# Patient Record
Sex: FEMALE | Race: BLACK OR AFRICAN AMERICAN
[De-identification: names, ages, dates, MRNs, and addresses within clinical notes are randomized per-mention and may not be internally consistent; named-entity substitution may affect disease eponyms.]

---

## 2017-09-09 ENCOUNTER — HOSPITAL ENCOUNTER (INPATIENT)
Dept: HOSPITAL 74 - JER | Age: 82
LOS: 5 days | Discharge: HOME | DRG: 378 | End: 2017-09-14
Attending: INTERNAL MEDICINE | Admitting: INTERNAL MEDICINE
Payer: COMMERCIAL

## 2017-09-09 VITALS — BODY MASS INDEX: 41 KG/M2

## 2017-09-09 DIAGNOSIS — K57.90: ICD-10-CM

## 2017-09-09 DIAGNOSIS — N18.4: ICD-10-CM

## 2017-09-09 DIAGNOSIS — E11.21: ICD-10-CM

## 2017-09-09 DIAGNOSIS — I87.2: ICD-10-CM

## 2017-09-09 DIAGNOSIS — E78.5: ICD-10-CM

## 2017-09-09 DIAGNOSIS — J45.909: ICD-10-CM

## 2017-09-09 DIAGNOSIS — K59.09: ICD-10-CM

## 2017-09-09 DIAGNOSIS — E11.319: ICD-10-CM

## 2017-09-09 DIAGNOSIS — K57.93: Primary | ICD-10-CM

## 2017-09-09 DIAGNOSIS — K43.9: ICD-10-CM

## 2017-09-09 DIAGNOSIS — E66.8: ICD-10-CM

## 2017-09-09 DIAGNOSIS — E11.22: ICD-10-CM

## 2017-09-09 DIAGNOSIS — D62: ICD-10-CM

## 2017-09-09 DIAGNOSIS — N17.8: ICD-10-CM

## 2017-09-09 DIAGNOSIS — I10: ICD-10-CM

## 2017-09-09 DIAGNOSIS — I13.0: ICD-10-CM

## 2017-09-09 DIAGNOSIS — I50.30: ICD-10-CM

## 2017-09-09 LAB
ALBUMIN SERPL-MCNC: 3.2 G/DL (ref 3.4–5)
ALP SERPL-CCNC: 76 U/L (ref 45–117)
ALT SERPL-CCNC: 19 U/L (ref 12–78)
ANION GAP SERPL CALC-SCNC: 12 MMOL/L (ref 8–16)
APTT BLD: 24 SECONDS (ref 26.9–34.4)
AST SERPL-CCNC: 14 U/L (ref 15–37)
BASOPHILS # BLD: 0.8 % (ref 0–2)
BILIRUB SERPL-MCNC: 0.9 MG/DL (ref 0.2–1)
CALCIUM SERPL-MCNC: 8.9 MG/DL (ref 8.5–10.1)
CO2 SERPL-SCNC: 23 MMOL/L (ref 21–32)
CREAT SERPL-MCNC: 2.4 MG/DL (ref 0.55–1.02)
DEPRECATED RDW RBC AUTO: 18.4 % (ref 11.6–15.6)
EOSINOPHIL # BLD: 1 % (ref 0–4.5)
GLUCOSE SERPL-MCNC: 154 MG/DL (ref 74–106)
INR BLD: 1.15 (ref 0.82–1.09)
MCH RBC QN AUTO: 28 PG (ref 25.7–33.7)
MCHC RBC AUTO-ENTMCNC: 31.4 G/DL (ref 32–36)
MCV RBC: 89 FL (ref 80–96)
NEUTROPHILS # BLD: 73.3 % (ref 42.8–82.8)
PLATELET # BLD AUTO: 173 K/MM3 (ref 134–434)
PMV BLD: 9 FL (ref 7.5–11.1)
PROT SERPL-MCNC: 6.6 G/DL (ref 6.4–8.2)
PT PNL PPP: 12.7 SEC (ref 9.98–11.88)
WBC # BLD AUTO: 6.3 K/MM3 (ref 4–10)

## 2017-09-09 PROCEDURE — 30233N1 TRANSFUSION OF NONAUTOLOGOUS RED BLOOD CELLS INTO PERIPHERAL VEIN, PERCUTANEOUS APPROACH: ICD-10-PCS | Performed by: INTERNAL MEDICINE

## 2017-09-09 PROCEDURE — P9038 RBC IRRADIATED: HCPCS

## 2017-09-09 PROCEDURE — P9058 RBC, L/R, CMV-NEG, IRRAD: HCPCS

## 2017-09-09 RX ADMIN — DEXTROSE AND SODIUM CHLORIDE SCH MLS/HR: 5; 900 INJECTION, SOLUTION INTRAVENOUS at 16:35

## 2017-09-09 RX ADMIN — PANTOPRAZOLE SODIUM SCH MLS/HR: 40 INJECTION, POWDER, FOR SOLUTION INTRAVENOUS at 23:00

## 2017-09-09 NOTE — PDOC
History of Present Illness





- General


Chief Complaint: Rectal Bleed


Stated Complaint: RECTAL BLEED


Time Seen by Provider: 09/09/17 13:41


History Source: Patient


Exam Limitations: No Limitations





- History of Present Illness


Initial Comments: 


86yo F with PMH of GI bleed, DM, presents c/o rectal bleeding.  Pt reports 1 

bloody bowel movement yesterday at 4pm, then nothing until today at 11am.  The 

bloody bowel movement at 11am today consisted of bright red blood and clots, 

prompting pt to come to the ER.  Pt had another bloody bowel movement here in 

the ER at 4pm.  Pt's last adm to the hospital was in 11/2016 for GI bleeding 

found to be 2/2 diverticulitis.  Pt denies abdominal pain.  





PCP: Dr. Mckee (used to be Dr. Denise)


GI: Dr. Ramires





09/09/17 16:30








Timing/Duration: 24 hours


Associated Symptoms: denies: chest pain, cough, diaphoresis, fever/chills, 

headaches, nausea/vomiting, rash, shortness of breath, syncope, weakness





Past History





- Past Medical History


Allergies/Adverse Reactions: 


 Allergies











Allergy/AdvReac Type Severity Reaction Status Date / Time


 


diphenhydramine HCl Allergy Severe anaphyl Verified 09/09/17 13:29





[From Benadryl]     


 


aspirin Allergy Unknown  Verified 09/09/17 13:29


 


natty flavor Allergy   Verified 09/09/17 13:29











Home Medications: 


Ambulatory Orders





Allopurinol [Zyloprim -] 100 mg PO DAILY 07/19/14 


Ascorbic Acid [Vitamin C] 0 mg PO DAILY 07/19/14 


Budesonide/Formeterol Fumarate [SYMBICORT 160/4.5mcg -] 2 inh PO BID 07/19/14 


Gabapentin 200 mg PO TID 07/19/14 


Simvastatin [Zocor -] 40 mg PO HS 07/19/14 


Valsartan [Diovan] 320 mg PO DAILY 07/19/14 


Acetaminophen [Tylenol .Extra-Strength -] 1,000 mg PO Q8H PRN #100 tablet 07/21/ 14 


Amlodipine Besylate [Norvasc -] 2.5 mg PO DAILY 11/14/15 


Furosemide [Lasix -] 80 mg PO BID 11/04/16 


Glipizide 5 mg PO BID 09/09/17 








Anemia: No


Asthma: Yes


Cancer: No


Cardiac Disorders: No


CVA: No


COPD: No


CHF: No


Dementia: No


Diabetes: Yes


GI Disorders: Yes (RECTAL BLEED)


 Disorders: No


HTN: Yes


Hypercholesterolemia: Yes


Liver Disease: No


Suicide Attempt (Hx): No


Seizures: No


Thyroid Disease: No





- Surgical History


Abdominal Surgery: Yes (hernia repairs, small bowel resection,debride abd wall 

abscess,)


Appendectomy: Yes


Cardiac Surgery: No


Cholecystectomy: No


Lung Surgery: No


Neurologic Surgery: No


Orthopedic Surgery: No





- Immunization History


Immunization Up to Date: Yes





- Psycho/Social/Smoking Cessation Hx


Anxiety: No


Suicidal Ideation: No


Smoking Status: No


Smoking History: Never smoked


Have you smoked in the past 12 months: No


Number of Cigarettes Smoked Daily: 0


Hx Alcohol Use: No


Drug/Substance Use Hx: No


Substance Use Type: None


Hx Substance Use Treatment: No





**Review of Systems





- Review of Systems


Able to Perform ROS?: Yes


Is the patient limited English proficient: No


Constitutional: No: Chills, Diaphoresis, Fever


HEENTM: No: Recent change in vision, Ear Pain, Nose Pain, Nose Congestion, 

Throat Pain


Respiratory: No: Cough, Orthopnea, Shortness of Breath, Stridor, Wheezing, 

Hemoptysis


Cardiac (ROS): Yes: Lightheadedness (at 11am when she had bloody bowel movement

, not now).  No: Chest Pain, Edema, Irregular Heart Rate, Palpitations, Syncope

, Chest Tightness


ABD/GI: Yes: Rectal Bleeding.  No: Abdominal Distended, Nausea, Vomiting, 

Abdominal cramping


: No: Burning, Dysuria, Hematuria


Musculoskeletal: No: Joint Pain, Muscle Pain


Integumentary: No: Bruising, Lesions, Rash


Neurological: No: Headache, Paresthesia, Weakness





*Physical Exam





- Vital Signs


 Last Vital Signs











Temp Pulse Resp BP Pulse Ox


 


 98.8 F   80   20   130/57   100 


 


 09/09/17 13:26  09/09/17 13:26  09/09/17 13:26 09/09/17 13:26 09/09/17 13:26














- Physical Exam


General Appearance: Yes: Appropriately Dressed, Obese.  No: Apparent Distress


HEENT: positive: EOMI, Normal Voice.  negative: Pale Conjunctivae, Scleral 

Icterus (R), Scleral Icterus (L), Nasal Congestion, Rhinorrhea


Neck: positive: Trachea midline, Supple


Respiratory/Chest: positive: Lungs Clear, Normal Breath Sounds.  negative: 

Respiratory Distress, Accessory Muscle Use


Cardiovascular: positive: Regular Rhythm, Regular Rate, S1, S2.  negative: 

Murmur


Gastrointestinal/Abdominal: positive: Soft.  negative: Distended, Guarding, 

Rebound, Tenderness


Rectal Exam: positive: other (small amount of blood seen externally on the 

rectum)


Extremity: positive: Pedal Edema, Swelling (2+ pitting edema to britt LE, which 

pt reports is normal for her).  negative: Calf Tenderness, Erythema


Integumentary: positive: Dry, Warm.  negative: Rash, Bruising


Neurologic: positive: Fully Oriented, Alert, Normal Mood/Affect, Normal Response





ED Treatment Course





- LABORATORY


CBC & Chemistry Diagram: 


 09/09/17 14:16





 09/09/17 14:16





- ADDITIONAL ORDERS


Additional order review: 


 Laboratory  Results











  09/09/17





  14:16


 


Blood Type  Cancelled


 


Antibody Screen  Cancelled


 


Spec Expiration Date  Cancelled








 











  09/09/17





  14:16


 


RBC  2.66 L D


 


MCV  89.0


 


MCHC  31.4 L


 


RDW  18.4 H D


 


MPV  9.0


 


Neutrophils %  73.3


 


Lymphocytes %  17.8  D


 


Monocytes %  7.1


 


Eosinophils %  1.0


 


Basophils %  0.8














Medical Decision Making





- Medical Decision Making


86yo F with PMH of GI bleed, diverticulitis, DM, presents c/o rectal bleeding.  

Small amount of blood seen on the external rectum on exam.  Last bloody bowel 

movement occurred at 4pm in ER.  Pt last admitted to hospital in 11/2016 for GI 

bleeding found to be 2/2 diverticulitis.





EKG 


CBC with diff, CMP, type and screen, stool occult blood, INR





09/09/17 16:47








09/09/17 17:11


CBC with diff - H/H 7.4/23.7 -> PRBCs given, Protonix 80mg IVPB given 


Pt to be admitted.  Dr. Danielle admitting for Dr. Mckee.  Dr. Danielle 

contacted and accepted adm.  ICU contacted and NP accepted adm.  





09/09/17 17:12











*DC/Admit/Observation/Transfer


Diagnosis at time of Disposition: 


 Rectal bleeding





- Discharge Dispostion


Condition at time of disposition: Guarded


Admit: Yes





- Referrals


Referrals: 


Jace Mckee MD [Primary Care Provider] -

## 2017-09-09 NOTE — PDOC
Attending Attestation





- HPI


HPI: 


09/09/17 14:39


The patient is a 87 year old female, with a significant past medical history of 

Asthma, DM, Rectal bleeding, HTN, HLD who presents to the emergency department 

with rectal bleeding since yesterday. Patient reports 2 episodes of bright red 

blood, with clots per rectum. Patient endorses associated dizziness and 

weakness after passing bowels. 





She denies chest pain, headache. She denies fever, chills, abdominal pain, 

nausea, vomit, diarrhea or constipation. She denies dysuria, frequency, urgency 

or hematuria. 





Allergies: diphenhydramine HCl, aspirin, natty flavor


Past surgical history: hernia repairs, small bowel resection,debride abdomen 

wall abscess, Appendectomy 


Social history: None


PCP: Dr. Mckee








- Physicial Exam


PE: 


09/09/17 14:39


GENERAL: Awake, alert, and fully oriented, in no acute distress. +Morbidly 

obese. 


HEAD: No signs of trauma


EYES: PERRLA, EOMI, sclera anicteric, conjunctiva clear


ENT: Auricles normal inspection, hearing grossly normal, nares patent, 

oropharynx clear without exudates. Moist mucosa


NECK: Normal ROM, supple, no lymphadenopathy, JVD, or masses


LUNGS: Breath sounds equal, clear to auscultation bilaterally.  No wheezes, and 

no crackles


HEART: Regular rate and rhythm, normal S1 and S2, no murmurs, rubs or gallops


ABDOMEN: Soft, nontender, normoactive bowel sounds.  No guarding, no rebound.  

No masses


EXTREMITIES: Normal range of motion. +2+ pitting edema to the knees 

bilaterally. No clubbing or cyanosis. No cords, erythema, or tenderness


NEUROLOGICAL: Cranial nerves II through XII grossly intact. Normal speech, 

normal gait


SKIN: Warm, Dry, normal turgor, no rashes or lesions noted.








- Medical Decision Making


09/09/17 16:43


Paged Dr. Debbie Danielle


Awaiting call back. 





09/09/17 16:50


Dr. Danielle returned the page and the patient's case was discussed. 





Documentation prepared by Sophia Almaraz, acting as medical scribe for Anne Goode MD


























<Sophia Almaraz - Last Filed: 09/09/17 16:50>





- Resident


Resident Name: Estela Moreno





- ED Attending Attestation


I have performed the following: I have examined & evaluated the patient, The 

case was reviewed & discussed with the resident, I agree w/resident's findings 

& plan, Exceptions are as noted





- Medical Decision Making


Pt with prior history of multiple GI bleeds. Most recent endo/colonoscopy shows 

significant blood, possibly diverticular bleed. Also with gastric erosions. 

Patient took protonix PO this morning, therefore I will just give the drip at 

present. Will admit to Dr. Danielle. 








<Anne Goode - Last Filed: 09/10/17 07:24>

## 2017-09-09 NOTE — CONSULT
Consult


Consult Specialty:: Pulm/CCM


Reason for Consultation:: GIB





- History of Present Illness


Chief Complaint: BRBPR


History of Present Illness: 


This is a 88 yo woman PNH: Asthma, DM, GIB: diverticular bleed, rectal, HTN who 

presented with BRBPR w/ clots x2 c/b dizziness (No LOC) starting the day prior 

to admission. She is followed closely by GI as an outpatient and was noted to 

be anemic ~2weeks prior to admission associated with tarry stools. In ED Hgb 

7.4 (baseline 10) with SCr 2.4. She was started on PPI drip. She was transfused 

PRBC x2. 


 


She denies CP/HA/F/chills/abd pain/nausea





- History Source


History Provided By: Patient, Medical Record





- Past Medical History


CNS: Yes: Other


Cardio/Vascular: Yes: CHF, HTN, Hyperlipdemia


Pulmonary: Yes: COPD


Gastrointestinal: Yes: Constipation, Diverticulosis, Gastritis, Other (colon 

polyp 2010, ventral incisional hernias)


Renal/: Yes: Renal Inusuff


Endocrine: Yes: Diabetes Mellitus, Other (diabetic retinopathy and nephropathy)





- Past Surgical History


Past Surgical History: Yes: Appendectomy, Breast Biopsy, Cataract Removal, 

Colonoscopy, Hernia Repair, Hysterectomy, Oopherectomy, Tonsillectomy, Upper 

Endoscopy





- Alcohol/Substance Use


Hx Alcohol Use: No





- Smoking History


Smoking history: Never smoked


Have you smoked in the past 12 months: No


Aproximately how many cigarettes per day: 0





- Social History


Usual Living Arrangement: Alone


ADL: Independent


Occupation: retired 


History of Recent Travel: No





Home Medications





- Allergies


Allergies/Adverse Reactions: 


 Allergies











Allergy/AdvReac Type Severity Reaction Status Date / Time


 


diphenhydramine HCl Allergy Severe anaphyl Verified 17 13:29





[From Benadryl]     


 


aspirin Allergy Unknown  Verified 17 13:29


 


natty flavor Allergy   Verified 17 13:29














- Home Medications


Home Medications: 


Ambulatory Orders





Allopurinol [Zyloprim -] 100 mg PO DAILY 14 


Ascorbic Acid [Vitamin C] 0 mg PO DAILY 14 


Budesonide/Formeterol Fumarate [SYMBICORT 160/4.5mcg -] 2 inh PO BID 14 


Gabapentin 200 mg PO TID 07/19/14 


Simvastatin [Zocor -] 40 mg PO HS 14 


Valsartan [Diovan] 320 mg PO DAILY 14 


Acetaminophen [Tylenol .Extra-Strength -] 1,000 mg PO Q8H PRN #100 tablet  


Amlodipine Besylate [Norvasc -] 2.5 mg PO DAILY 11/14/15 


Furosemide [Lasix -] 80 mg PO BID 16 


Glipizide 5 mg PO BID 17 











Family Disease History





- Family Disease History


Family Disease History: Heart Disease: Father ( MI in his 50's), CA: Mother 

(rectal cancer)





Review of Systems





- Review of Systems


Cardiovascular: reports: No Symptoms


Respiratory: reports: No Symptoms


Gastrointestinal: reports: Melena, Rectal Bleeding





Physical Exam


Vital Signs: 


 Vital Signs











Temperature  98.4 F   17 19:45


 


Pulse Rate  64   17 19:45


 


Respiratory Rate  18   17 19:45


 


Blood Pressure  126/59   17 19:45


 


O2 Sat by Pulse Oximetry (%)  100   17 19:45








 Current Medications





Dextrose/Sodium Chloride (D5-Ns -)  1,000 mls @ 83 mls/hr IV ASDIR ELLA


   Last Admin: 17 16:35 Dose:  83 mls/hr


Pantoprazole Sodium 40 mg/ (Sodium Chloride)  100 mls @ 200 mls/hr IVPB BID ELLA


Insulin Aspart (Novolog Vial Sliding Scale -)  0 vial SQ BIDAC ELLA


   PRN Reason: Protocol


   Last Admin: 17 17:49 Dose:  Not Given








Constitutional: Yes: No Distress, Calm


Eyes: Yes: EOM Intact, PERRL


Cardiovascular: Yes: Regular Rate and Rhythm, S1, S2


Respiratory: Yes: CTA Bilaterally


Gastrointestinal: Yes: Normal Bowel Sounds, Soft, Abdomen, Obese


Musculoskeletal: Yes: WNL


Edema: No


Neurological: Yes: Oriented


Labs: 


 CBCD











WBC  6.3 K/mm3 (4.0-10.0)   17  14:16    


 


RBC  2.66 M/mm3 (3.60-5.2)  L D 17  14:16    


 


Hgb  7.4 GM/dL (10.7-15.3)  L D 17  14:16    


 


Hct  23.7 % (32.4-45.2)  L D 17  14:16    


 


MCV  89.0 fl (80-96)   17  14:16    


 


MCHC  31.4 g/dl (32.0-36.0)  L  17  14:16    


 


RDW  18.4 % (11.6-15.6)  H D 17  14:16    


 


Plt Count  173 K/MM3 (134-434)   17  14:16    


 


MPV  9.0 fl (7.5-11.1)   17  14:16    








 CMP











Sodium  142 mmol/L (136-145)   17  14:16    


 


Potassium  4.2 mmol/L (3.5-5.1)   17  14:16    


 


Chloride  107 mmol/L ()   17  14:16    


 


Carbon Dioxide  23 mmol/L (21-32)   17  14:16    


 


Anion Gap  12  (8-16)   17  14:16    


 


BUN  52 mg/dL (7-18)  H D 17  14:16    


 


Creatinine  2.4 mg/dL (0.55-1.02)  H D 17  14:16    


 


Creat Clearance w eGFR  19.10  (>60)   17  14:16    


 


Random Glucose  154 mg/dL ()  H  17  14:16    


 


Calcium  8.9 mg/dL (8.5-10.1)   17  14:16    


 


Total Bilirubin  0.9 mg/dL (0.2-1.0)   17  14:16    


 


AST  14 U/L (15-37)  L  17  14:16    


 


ALT  19 U/L (12-78)   17  14:16    


 


Alkaline Phosphatase  76 U/L ()  D 17  14:16    


 


Total Protein  6.6 g/dl (6.4-8.2)   17  14:16    


 


Albumin  3.2 g/dl (3.4-5.0)  L  17  14:16    














Imaging





- Results


Chest X-ray: Report Reviewed, Image Reviewed





Assessment/Plan


All Active Problems





(HFpEF) heart failure with preserved ejection fraction (Acute) 


Acute on chronic renal failure (Acute) 


Anemia (Acute) 


COPD (chronic obstructive pulmonary disease) (Acute) 


Diabetes mellitus (Acute) 


Diverticulosis (Acute) 


Gastrointestinal hemorrhage (Acute) 


Rectal bleeding (Acute) 


Renal insufficiency, mild (Acute) 


Venous insufficiency (chronic) (peripheral) (Acute) 





88 yo w/ LGIB likely recurrent diverticular bleed with Acute on chronic kidney 

injury likely prerenal 





Notify GI


Obtain adequate IV access.  2 large (18 gauge or larger) bore IVs.  


Type and crossmatch blood.


Fluid resuscitate to goal of stabilization of  HR and BP with Normal Saline


Transfuse PRBCS to hemoglobin > 7g/dl, and platelets  >50K.


Consider FFP, vitamin K if INR >1.5 


Consider DDAVP (0.3 mcg/kg IV q12h x 2) if renal failure/uremia.


Cont PPI drip 


NPO until after EGD/c-scope


Check CBC every 4-6 hours, coags every 8-12 hours if abnormal


Renal dose all medications


Send urine lytes


Renal u/s 


SCD for DVT prophylaxis





Boerem ACNP


Pulm/CCM





CCT: 35m

## 2017-09-10 LAB
ANION GAP SERPL CALC-SCNC: 9 MMOL/L (ref 8–16)
BASOPHILS # BLD: 0.7 % (ref 0–2)
BASOPHILS # BLD: 0.7 % (ref 0–2)
BASOPHILS # BLD: 0.9 % (ref 0–2)
CALCIUM SERPL-MCNC: 8.4 MG/DL (ref 8.5–10.1)
CO2 SERPL-SCNC: 23 MMOL/L (ref 21–32)
CREAT SERPL-MCNC: 1.8 MG/DL (ref 0.55–1.02)
DEPRECATED RDW RBC AUTO: 16.2 % (ref 11.6–15.6)
DEPRECATED RDW RBC AUTO: 16.6 % (ref 11.6–15.6)
DEPRECATED RDW RBC AUTO: 17 % (ref 11.6–15.6)
EOSINOPHIL # BLD: 1 % (ref 0–4.5)
EOSINOPHIL # BLD: 1.1 % (ref 0–4.5)
EOSINOPHIL # BLD: 1.2 % (ref 0–4.5)
GLUCOSE SERPL-MCNC: 97 MG/DL (ref 74–106)
MCH RBC QN AUTO: 28.3 PG (ref 25.7–33.7)
MCH RBC QN AUTO: 28.8 PG (ref 25.7–33.7)
MCH RBC QN AUTO: 29 PG (ref 25.7–33.7)
MCHC RBC AUTO-ENTMCNC: 32.3 G/DL (ref 32–36)
MCHC RBC AUTO-ENTMCNC: 33.2 G/DL (ref 32–36)
MCHC RBC AUTO-ENTMCNC: 33.6 G/DL (ref 32–36)
MCV RBC: 86.2 FL (ref 80–96)
MCV RBC: 86.7 FL (ref 80–96)
MCV RBC: 87.6 FL (ref 80–96)
NEUTROPHILS # BLD: 72.6 % (ref 42.8–82.8)
NEUTROPHILS # BLD: 73.4 % (ref 42.8–82.8)
NEUTROPHILS # BLD: 76.1 % (ref 42.8–82.8)
PLATELET # BLD AUTO: 122 K/MM3 (ref 134–434)
PLATELET # BLD AUTO: 125 K/MM3 (ref 134–434)
PLATELET # BLD AUTO: 126 K/MM3 (ref 134–434)
PMV BLD: 9.6 FL (ref 7.5–11.1)
PMV BLD: 9.6 FL (ref 7.5–11.1)
PMV BLD: 9.8 FL (ref 7.5–11.1)
WBC # BLD AUTO: 6.6 K/MM3 (ref 4–10)
WBC # BLD AUTO: 6.9 K/MM3 (ref 4–10)
WBC # BLD AUTO: 7.4 K/MM3 (ref 4–10)

## 2017-09-10 RX ADMIN — INSULIN ASPART SCH: 100 INJECTION, SOLUTION INTRAVENOUS; SUBCUTANEOUS at 15:30

## 2017-09-10 RX ADMIN — PANTOPRAZOLE SODIUM SCH MG: 40 INJECTION, POWDER, FOR SOLUTION INTRAVENOUS at 22:00

## 2017-09-10 RX ADMIN — DEXTROSE AND SODIUM CHLORIDE SCH MLS/HR: 5; 900 INJECTION, SOLUTION INTRAVENOUS at 15:30

## 2017-09-10 RX ADMIN — PANTOPRAZOLE SODIUM SCH MLS/HR: 40 INJECTION, POWDER, FOR SOLUTION INTRAVENOUS at 12:21

## 2017-09-10 RX ADMIN — PANTOPRAZOLE SODIUM SCH MLS/HR: 40 INJECTION, POWDER, FOR SOLUTION INTRAVENOUS at 21:37

## 2017-09-10 RX ADMIN — INSULIN ASPART SCH: 100 INJECTION, SOLUTION INTRAVENOUS; SUBCUTANEOUS at 06:14

## 2017-09-10 NOTE — CON.GI
Consult


Consult Specialty:: GASTROENTEROLOGY





- History of Present Illness


Chief Complaint: RECTAL BLEEDING


History of Present Illness: 


THIS IS A 87 NYEAR OLD FEMALE WITH HX OF DIVERTICULOSIS AND PRIOR GI BLEEDING 

ADMITTED FOR THE SAME AFTER A WEEK OF SEVERE CONSTIPATION.  SHE STARTED TO HAVE 

BRBPR ON THURSDAY AND WAS ADMITTED YESTERDAY. ADMISSION HGB WAS 7.4.  SHE WAS 

TRANSFUSED 2 UNITS PRBC'S WITH MINIMAL IMPROVEMENT. SHE IS NOW BEING TRANSFUSED 

HER THIRD UNTI.  THE STAFF AND PATIENT STATE THAT HER BLEEDING HAS SLOWED 

SOMEWHAT.  HER VITAL SIGNS WERE ALWAYS STABLE.





HER LAST ADMISSION FOR GI BLEEDING INCLUDED AN EGD AND COLONOSCOPY. THE UPPER 

ENDOSCOPY DID NOT REVEAL AN ETIOLOGY FOR THE BLOOD LOSSS.  THE COLONOSCOPY 

SHOWED CLOTTED BLOOD AND SEVERE DIVERTICULOSIS WITH BILE FLOW FROM THE ICV. 





- History Source


History Provided By: Patient, Medical Record


Limitations to Obtaining History: No Limitations





- Past Medical History


CNS: Yes: Other


Cardio/Vascular: Yes: CHF, HTN, Hyperlipdemia


Pulmonary: Yes: COPD


Gastrointestinal: Yes: Constipation, Diverticulosis, Gastritis, Other (colon 

polyp 2010, ventral incisional hernias, DIVERTICULOSIS WITH CLOTTED BLOOD)


Renal/: Yes: Renal Inusuff


...Pregnant: No


Endocrine: Yes: Diabetes Mellitus, Other (diabetic retinopathy and nephropathy)





- Past Surgical History


Past Surgical History: Yes: Appendectomy, Breast Biopsy, Cataract Removal, 

Colonoscopy, Hernia Repair, Hysterectomy, Oopherectomy, Tonsillectomy, Upper 

Endoscopy





- Alcohol/Substance Use


Hx Alcohol Use: No





- Smoking History


Smoking history: Never smoked


Have you smoked in the past 12 months: No


Aproximately how many cigarettes per day: 0





- Social History


Usual Living Arrangement: Alone


ADL: Independent


Occupation: retired 


History of Recent Travel: No





Home Medications





- Allergies


Allergies/Adverse Reactions: 


 Allergies











Allergy/AdvReac Type Severity Reaction Status Date / Time


 


diphenhydramine HCl Allergy Severe anaphyl Verified 17 13:29





[From Benadryl]     


 


aspirin Allergy Unknown  Verified 17 13:29


 


natty flavor Allergy   Verified 17 13:29














- Home Medications


Home Medications: 


Ambulatory Orders





Allopurinol [Zyloprim -] 100 mg PO DAILY 14 


Ascorbic Acid [Vitamin C] 0 mg PO DAILY 14 


Budesonide/Formeterol Fumarate [SYMBICORT 160/4.5mcg -] 2 inh PO BID 14 


Gabapentin 200 mg PO TID 14 


Simvastatin [Zocor -] 40 mg PO HS 14 


Valsartan [Diovan] 320 mg PO DAILY 14 


Acetaminophen [Tylenol .Extra-Strength -] 1,000 mg PO Q8H PRN #100 tablet  


Amlodipine Besylate [Norvasc -] 2.5 mg PO DAILY 11/14/15 


Furosemide [Lasix -] 80 mg PO BID 16 


Glipizide 5 mg PO BID 17 











Family Disease History





- Family Disease History


Family Disease History: Heart Disease: Father ( MI in his 50's), CA: Mother 

(rectal cancer)





Review of Systems





- Review of Systems


Constitutional: reports: Loss of Appetite


Eyes: reports: No Symptoms


HENT: reports: No Symptoms


Neck: reports: No Symptoms


Cardiovascular: reports: No Symptoms


Respiratory: reports: No Symptoms


Gastrointestinal: reports: Constipation, Rectal Bleeding


Genitourinary: reports: No Symptoms


Breasts: reports: No Symptoms Reported


Musculoskeletal: reports: No Symptoms


Integumentary: reports: No Symptoms


Neurological: reports: No Symptoms


Endocrine: reports: No Symptoms





Physical Exam-GI


Vital Signs: 


 Vital Signs











Temperature  97.7 F   09/10/17 11:00


 


Pulse Rate  54 L  09/10/17 12:23


 


Respiratory Rate  20   09/10/17 12:23


 


Blood Pressure  144/50   09/10/17 12:23


 


O2 Sat by Pulse Oximetry (%)  100   09/10/17 08:00











Constitutional: Yes: Obese


Eyes: Yes: Conjunctiva Clear


HENT: Yes: Normocephalic


Cardiovascular: Yes: Regular Rate and Rhythm


Respiratory: Yes: Regular


Gastrointestinal Inspection: Yes: WNL


...Auscultate: Yes: Normoactive Bowel Sounds


...Palpate: Yes: Soft


...Rectal Exam: Yes: Guaiac Positive


Extremities: Yes: WNL


Labs: 


 CBC, BMP





 09/10/17 09:24 





 INR, PTT











INR  1.15  (0.82-1.09)  H  17  14:16    








 Laboratory Tests











  17





  14:16 14:16 14:16


 


WBC  6.3  


 


Hgb  7.4 L D  


 


Hct  23.7 L D  


 


Plt Count  173  


 


INR   1.15 H 


 


Sodium    142


 


Potassium    4.2


 


Chloride    107


 


Carbon Dioxide    23


 


Anion Gap    12


 


BUN    52 H D


 


Creatinine    2.4 H D


 


Creat Clearance w eGFR    19.10


 


Random Glucose    154 H


 


Calcium    8.9


 


Total Bilirubin    0.9


 


AST    14 L


 


ALT    19


 


Alkaline Phosphatase    76  D


 


Total Protein    6.6


 


Albumin    3.2 L


 


Stool Occult Blood   














  09/09/17 09/10/17





  14:26 09:24


 


WBC   6.9


 


Hgb   7.6 L


 


Hct   23.4 L


 


Plt Count   125 L D


 


INR  


 


Sodium  


 


Potassium  


 


Chloride  


 


Carbon Dioxide  


 


Anion Gap  


 


BUN  


 


Creatinine  


 


Creat Clearance w eGFR  


 


Random Glucose  


 


Calcium  


 


Total Bilirubin  


 


AST  


 


ALT  


 


Alkaline Phosphatase  


 


Total Protein  


 


Albumin  


 


Stool Occult Blood  Negative 














Assessment/Plan


GI BLEEDING


BLOOD LOSS ANEMIA


DIVERTICULAR BLEEDING








AGREE WITH CURRENT PLAN


TRANSFUSE TO HGB 8.5 OR HIGHER


MOST DIVERTICULAR BLEEDING WILL STOP ON ITS OWN


IF BLEEDING DOES NOT STOP OR THE BLEEDING INTENSIFIES WOULD SUGGEST CTA OR 

REPEAT COLONOSCOPY 


TREND CBC,BMP








DR BIRM[place]AM WILL RETURN IN THE AM 





ICU TIME: 45 MINUTES














NINFA BRADY MD

## 2017-09-10 NOTE — PN
Progress Note (short form)





- Note


Progress Note: 


PULM/CCM





Pt seen and examined in ICU





24HR:


-hemodynamically stable


-1 further dark maroon stool


-repeat CBC pending





 Vital Signs











Temp  98.6 F   09/10/17 00:00


 


Pulse  56 L  09/10/17 07:59


 


Resp  22   09/10/17 07:59


 


BP  132/78   09/10/17 07:59


 


Pulse Ox  100   09/10/17 08:00








 Intake & Output











 09/09/17 09/09/17 09/10/17





 11:59 23:59 11:59


 


Intake Total  350 724


 


Balance  350 724


 


Weight  98.4 kg 


 


Intake:   


 


  IV   664


 


    D5-Ns - 1,000 ml @ 83 mls   664





    /hr IV ASDIR ELLA Rx#:   





    [id number]   


 


  IVPB   60


 


  Packed Cells  350 


 


Other:   


 


  Voiding Method  Bedpan Bedpan


 


  # Unmeasured Voids   


 


    Void  2 


 


  Bowel Movement  Yes No


 


  # Bowel Movements  2 


 


  Height  5 ft 1 in 


 


  Body Mass Index (BMI)  41.0 








Active Medications





Dextrose/Sodium Chloride (D5-Ns -)  1,000 mls @ 83 mls/hr IV ASDIR ELLA


   Last Admin: 09/09/17 16:35 Dose:  83 mls/hr


Pantoprazole Sodium 40 mg/ (Sodium Chloride)  100 mls @ 200 mls/hr IVPB BID ELLA


   Last Admin: 09/09/17 23:00 Dose:  200 mls/hr


Insulin Aspart (Novolog Vial Sliding Scale -)  1 vial SQ BIDAC ELLA


   PRN Reason: Protocol


   Last Admin: 09/10/17 06:14 Dose:  Not Given





 CBC, BMP





 09/09/17 14:16 





 09/09/17 14:16 


 INR, PTT











INR  1.15  (0.82-1.09)  H  09/09/17  14:16    











Assessment/Plan


All Active Problems





(HFpEF) heart failure with preserved ejection fraction (Acute) 


Acute on chronic renal failure (Acute) 


Anemia (Acute) 


COPD (chronic obstructive pulmonary disease) (Acute) 


Diabetes mellitus (Acute) 


Diverticulosis (Acute) 


Gastrointestinal hemorrhage (Acute) 


Rectal bleeding (Acute) 


Renal insufficiency, mild (Acute) 


Venous insufficiency (chronic) (peripheral) (Acute) 





88 yo w/ LGIB likely recurrent diverticular bleed with Acute on chronic kidney 

injury likely prerenal 





GI consult sent in ED


Maintain 2 large (18 gauge or larger) bore IVs.  


Type and crossmatch blood.


Fluid resuscitate to goal of stabilization of  HR and BP with Normal Saline


Transfuse PRBCS to hemoglobin > 7g/dl, and platelets  >50K.


Consider FFP, vitamin K if INR >1.5 


can consider IR if brisk lower and able to localize


Cont PPI drip 


NPO until after EGD/c-scope


Check CBC every 4-6 hours, coags every 8-12 hours if abnormal


Renal dose all medications


Send urine lytes


Renal u/s 


SCD for DVT prophylaxis








Gerard Martinez ACNP


[id number]





35CCT

## 2017-09-10 NOTE — HP
Admitting History and Physical





- Primary Care Physician


PCP: Jace Mckee





- Admission


Chief Complaint: gi bleeding


History of Present Illness: 


-ER HISTORY---


 HPI


HPI: 


17 14:39


The patient is a 87 year old female, with a significant past medical history of 

Asthma, DM, Rectal bleeding, HTN, HLD who presents to the emergency department 

with rectal bleeding since yesterday. Patient reports 2 episodes of bright red 

blood, with clots per rectum. Patient endorses associated dizziness and 

weakness after passing bowels. 





She denies chest pain, headache. She denies fever, chills, abdominal pain, 

nausea, vomit, diarrhea or constipation. She denies dysuria, frequency, urgency 

or hematuria. 





Allergies: diphenhydramine HCl, aspirin, natty flavor


Past surgical history: hernia repairs, small bowel resection,debride abdomen 

wall abscess, Appendectomy 


Social history: None


PCP: Dr. Mckee





PT EXAMINED BY ME IN THE ICU





Apparently fine till yesterday when she had bloody stool x 2 at home. In the ER

, had bloody bm and here in ICU this AM as well.


No abdominal pain , but feels dizzy. Received 2 units PRBC so far. Has h/o 

diverticulosis, multiple abdominal surgeries, SBO, s/p small bowel resection, 

colostomy and reversal of colostomy. 





History Source: Patient


Limitations to Obtaining History: No Limitations





- Past Medical History


CNS: Yes: Other


Cardiovascular: Yes: CHF, HTN, Hyperlipdemia


Pulmonary: Yes: COPD


Gastrointestinal: Yes: Constipation, Diverticulosis, Gastritis, Other (colon 

polyp 2010, ventral incisional hernias)


Renal/: Yes: Renal Inusuff


...Pregnant: No


Endocrine: Yes: Diabetes Mellitus, Other (diabetic retinopathy and nephropathy)





- Past Surgical History


Past Surgical History: Yes: Appendectomy, Breast Biopsy, Cataract Removal, 

Colonoscopy, Hernia Repair, Hysterectomy, Oopherectomy, Tonsillectomy, Upper 

Endoscopy





- Smoking History


Smoking history: Never smoked


Have you smoked in the past 12 months: No


Aproximately how many cigarettes per day: 0





- Alcohol/Substance Use


Hx Alcohol Use: No





- Social History


ADL: Independent


Occupation: retired 


History of Recent Travel: No





Home Medications





- Allergies


Allergies/Adverse Reactions: 


 Allergies











Allergy/AdvReac Type Severity Reaction Status Date / Time


 


diphenhydramine HCl Allergy Severe anaphyl Verified 17 13:29





[From Benadryl]     


 


aspirin Allergy Unknown  Verified 17 13:29


 


natty flavor Allergy   Verified 17 13:29














- Home Medications


Home Medications: 


Ambulatory Orders





Allopurinol [Zyloprim -] 100 mg PO DAILY 14 


Ascorbic Acid [Vitamin C] 0 mg PO DAILY 14 


Budesonide/Formeterol Fumarate [SYMBICORT 160/4.5mcg -] 2 inh PO BID 14 


Gabapentin 200 mg PO TID 14 


Simvastatin [Zocor -] 40 mg PO HS 14 


Valsartan [Diovan] 320 mg PO DAILY 14 


Acetaminophen [Tylenol .Extra-Strength -] 1,000 mg PO Q8H PRN #100 tablet  


Amlodipine Besylate [Norvasc -] 2.5 mg PO DAILY 11/14/15 


Furosemide [Lasix -] 80 mg PO BID 16 


Glipizide 5 mg PO BID 17 











Family Disease History





- Family Disease History


Family Disease History: Heart Disease: Father ( MI in his 50's), CA: Mother 

(rectal cancer)





Review of Systems





- Review of Systems


Constitutional: denies: Chills, Fever, Loss of Appetite


Gastrointestinal: reports: Melena, Rectal Bleeding.  denies: Abdominal Pain, 

Dysphagia, Vomiting Blood





Physical Examination


Vital Signs: 


 Vital Signs











Temperature  98.6 F   09/10/17 00:00


 


Pulse Rate  56 L  09/10/17 07:59


 


Respiratory Rate  22   09/10/17 07:59


 


Blood Pressure  132/78   09/10/17 07:59


 


O2 Sat by Pulse Oximetry (%)  100   09/10/17 08:00











Constitutional: Yes: No Distress, Pallor


Cardiovascular: Yes: Regular Rate and Rhythm


Respiratory: Yes: CTA Bilaterally


Gastrointestinal: Yes: Normal Bowel Sounds, Soft, Abdomen, Obese, Other (

multiple surgical scars).  No: Distention, Tenderness


Edema: Yes


Edema: LLE: 2+, RLE: 2+


Psychiatric: Yes: Alert, Oriented


Labs: 


 Laboratory Results - last 24 hr











  09/09/17 09/09/17 09/10/17





  14:40 17:47 06:12


 


WBC   


 


Corrected WBC (auto)   


 


RBC   


 


Hgb   


 


Hct   


 


MCV   


 


MCH   


 


MCHC   


 


RDW   


 


Plt Count   


 


MPV   


 


Neutrophils %   


 


Lymphocytes %   


 


Monocytes %   


 


Eosinophils %   


 


Basophils %   


 


Platelet Estimate   


 


Platelet Comment   


 


RBC Morphology   


 


Sodium   


 


Potassium   


 


Chloride   


 


Carbon Dioxide   


 


Anion Gap   


 


BUN   


 


Creatinine   


 


POC Glucometer   97.58320  134.92058


 


Random Glucose   


 


Calcium   


 


Blood Type  O POSITIVE  


 


Antibody Screen  Negative  


 


Crossmatch  See Detail  


 


Spec Expiration Date    














  09/10/17 09/10/17 09/10/17





  08:20 09:24 14:42


 


WBC  Cancelled  6.9  6.6


 


Corrected WBC (auto)  Cancelled  


 


RBC  Cancelled  2.67 L  3.24 L D


 


Hgb  Cancelled  7.6 L  9.3 L D


 


Hct  Cancelled  23.4 L  28.1 L D


 


MCV  Cancelled  87.6  86.7


 


MCH  Cancelled  28.3  28.8


 


MCHC  Cancelled  32.3  33.2


 


RDW  Cancelled  17.0 H  16.6 H


 


Plt Count  Cancelled  125 L D  122 L


 


MPV  Cancelled  9.6  9.6


 


Neutrophils %  Cancelled  72.6  76.1


 


Lymphocytes %  Cancelled  13.9  D  13.1


 


Monocytes %  Cancelled  11.8 H  8.9


 


Eosinophils %  Cancelled  1.0  1.2


 


Basophils %  Cancelled  0.7  0.7


 


Platelet Estimate  Cancelled  


 


Platelet Comment  Cancelled  


 


RBC Morphology  Cancelled  


 


Sodium   


 


Potassium   


 


Chloride   


 


Carbon Dioxide   


 


Anion Gap   


 


BUN   


 


Creatinine   


 


POC Glucometer   


 


Random Glucose   


 


Calcium   


 


Blood Type   


 


Antibody Screen   


 


Crossmatch   


 


Spec Expiration Date   














  09/10/17 09/10/17





  14:42 15:26


 


WBC  


 


Corrected WBC (auto)  


 


RBC  


 


Hgb  


 


Hct  


 


MCV  


 


MCH  


 


MCHC  


 


RDW  


 


Plt Count  


 


MPV  


 


Neutrophils %  


 


Lymphocytes %  


 


Monocytes %  


 


Eosinophils %  


 


Basophils %  


 


Platelet Estimate  


 


Platelet Comment  


 


RBC Morphology  


 


Sodium  148 H 


 


Potassium  4.5 


 


Chloride  116 H 


 


Carbon Dioxide  23 


 


Anion Gap  9 


 


BUN  42 H 


 


Creatinine  1.8 H D 


 


POC Glucometer   124.44242


 


Random Glucose  97  D 


 


Calcium  8.4 L 


 


Blood Type  


 


Antibody Screen  


 


Crossmatch  


 


Spec Expiration Date  














Imaging





- Results


Chest X-ray: Image Reviewed (cardiomegaly, no congestion)


Cat Scan: Report Reviewed


EKG: Image Reviewed (NSR)





Assessment/Plan


GI Bleeding


Symptomatic anemia


Diverticulosis


--


Keep NPO


ICU monitpring


Protonix drip


GI evaluation


iv fluids


DVT prophylaxis-- SCD


check CBC q6H


PRBC as needed

## 2017-09-10 NOTE — EKG
Test Reason : 

Blood Pressure : ***/*** mmHG

Vent. Rate : 062 BPM     Atrial Rate : 062 BPM

   P-R Int : 150 ms          QRS Dur : 088 ms

    QT Int : 426 ms       P-R-T Axes : 047 -58 054 degrees

   QTc Int : 432 ms

 

NORMAL SINUS RHYTHM

LEFT ANTERIOR FASCICULAR BLOCK

ABNORMAL ECG

WHEN COMPARED WITH ECG OF 04-NOV-2016 11:05,

NO SIGNIFICANT CHANGE WAS FOUND

Confirmed by MD PALOMO, SUSANA ([id number]) on 9/10/2017 9:48:00 AM

 

Referred By:             Confirmed By:SUSAAN CULVER MD

## 2017-09-11 LAB
ANION GAP SERPL CALC-SCNC: 7 MMOL/L (ref 8–16)
BASOPHILS # BLD: 0.7 % (ref 0–2)
BASOPHILS # BLD: 0.7 % (ref 0–2)
CALCIUM SERPL-MCNC: 8.4 MG/DL (ref 8.5–10.1)
CO2 SERPL-SCNC: 21 MMOL/L (ref 21–32)
CREAT SERPL-MCNC: 1.6 MG/DL (ref 0.55–1.02)
DEPRECATED RDW RBC AUTO: 15.9 % (ref 11.6–15.6)
DEPRECATED RDW RBC AUTO: 16 % (ref 11.6–15.6)
DEPRECATED RDW RBC AUTO: 16.3 % (ref 11.6–15.6)
EOSINOPHIL # BLD: 1.3 % (ref 0–4.5)
EOSINOPHIL # BLD: 1.4 % (ref 0–4.5)
GLUCOSE SERPL-MCNC: 120 MG/DL (ref 74–106)
INR BLD: 1.15 (ref 0.82–1.09)
MCH RBC QN AUTO: 28.4 PG (ref 25.7–33.7)
MCH RBC QN AUTO: 29.1 PG (ref 25.7–33.7)
MCH RBC QN AUTO: 29.4 PG (ref 25.7–33.7)
MCHC RBC AUTO-ENTMCNC: 32.4 G/DL (ref 32–36)
MCHC RBC AUTO-ENTMCNC: 33.6 G/DL (ref 32–36)
MCHC RBC AUTO-ENTMCNC: 34 G/DL (ref 32–36)
MCV RBC: 86.3 FL (ref 80–96)
MCV RBC: 86.6 FL (ref 80–96)
MCV RBC: 87.6 FL (ref 80–96)
NEUTROPHILS # BLD: 68 % (ref 42.8–82.8)
NEUTROPHILS # BLD: 70.3 % (ref 42.8–82.8)
PLATELET # BLD AUTO: 126 K/MM3 (ref 134–434)
PLATELET # BLD AUTO: 128 K/MM3 (ref 134–434)
PLATELET # BLD AUTO: 136 K/MM3 (ref 134–434)
PMV BLD: 9.5 FL (ref 7.5–11.1)
PMV BLD: 9.6 FL (ref 7.5–11.1)
PMV BLD: 9.9 FL (ref 7.5–11.1)
PT PNL PPP: 12.7 SEC (ref 9.98–11.88)
WBC # BLD AUTO: 5.4 K/MM3 (ref 4–10)
WBC # BLD AUTO: 5.8 K/MM3 (ref 4–10)
WBC # BLD AUTO: 6.3 K/MM3 (ref 4–10)

## 2017-09-11 RX ADMIN — DEXTROSE AND SODIUM CHLORIDE SCH MLS/HR: 5; 900 INJECTION, SOLUTION INTRAVENOUS at 12:00

## 2017-09-11 RX ADMIN — INSULIN ASPART SCH: 100 INJECTION, SOLUTION INTRAVENOUS; SUBCUTANEOUS at 16:47

## 2017-09-11 RX ADMIN — PANTOPRAZOLE SODIUM SCH MG: 40 INJECTION, POWDER, FOR SOLUTION INTRAVENOUS at 09:53

## 2017-09-11 RX ADMIN — INSULIN ASPART SCH: 100 INJECTION, SOLUTION INTRAVENOUS; SUBCUTANEOUS at 07:35

## 2017-09-11 NOTE — PN
GI Progress Note


Subjective: 


Last BM this morning.  Dark blood


No abdominal pain








- Objective


Vital Signs: 


 Vital Signs











Temperature  98.1 F   09/11/17 14:09


 


Pulse Rate  55 L  09/11/17 14:09


 


Respiratory Rate  18   09/11/17 14:09


 


Blood Pressure  156/75   09/11/17 14:09


 


O2 Sat by Pulse Oximetry (%)  100   09/11/17 09:00











Constitutional: Calm


Eyes: No: Sclera Icterus


Cardiovascular: Yes: Regular Rate and Rhythm


Respiratory: Yes: CTA Bilaterally


Gastrointestinal Inspection: Yes: Hernia (ventral hernias, non-tender, reducible

), Scars.  No: Distention


...Auscultate: Yes: Normoactive Bowel Sounds


...Palpate: No: Tenderness


Edema: Yes


Edema: LLE: 1+, RLE: 1+


Neurological: Yes: Alert


Labs: 


 CBC, BMP





 09/11/17 11:55 





 09/11/17 05:00 





 INR, PTT











INR  1.15  (0.82-1.09)  H  09/11/17  05:00    














Assessment/Plan


Suspected recurrent diverticular bleed:


hemodynamically stable


Monitor H/H


If active bleeding recurs, transfer to ICU and obtain surgical consult / 

bleeding scan

## 2017-09-11 NOTE — PN
Progress Note (short form)





- Note


Progress Note: 


Pt seen/ examined in icu. 


chart reviewed.


comfortable


feels much better


denies pain.


says had small amount of bleeding earlier today.


 Vital Signs











Temp  98.4 F   09/11/17 10:00


 


Pulse  49 L  09/11/17 10:00


 


Resp  19   09/11/17 10:00


 


BP  130/51   09/11/17 10:00


 


Pulse Ox  100   09/11/17 09:00








 Intake & Output











 09/10/17 09/10/17 09/11/17





 11:59 23:59 11:59


 


Intake Total 724 1260 693.2


 


Balance 724 1260 693.2


 


Intake:   


 


   660 693.2


 


    D5-Ns - 1,000 ml @ 83 mls 664 660 693.2





    /hr IV ASDIR [facility] Rx#:   





    [id number]   


 


  IVPB 60 200 


 


  Packed Cells  400 


 


Other:   


 


  Voiding Method Bedpan Bedpan Bedpan


 


  # Unmeasured Voids   


 


    Void  1 2


 


  Bowel Movement No Yes: small maroon with clots No


 


  # Bowel Movements  1 








Active Medications





Dextrose/Sodium Chloride (D5-Ns -)  1,000 mls @ 83 mls/hr IV ASDIR ELLA


   Last Admin: 09/10/17 15:30 Dose:  83 mls/hr


Insulin Aspart (Novolog Vial Sliding Scale -)  1 vial SQ BIDAC [facility]


   PRN Reason: Protocol


   Last Admin: 09/11/17 07:35 Dose:  Not Given


Pantoprazole Sodium (Protonix 40mg Ivpb (Pre-Docked))  40 mg IVPB BID [facility]


   Last Admin: 09/11/17 09:53 Dose:  40 mg





 CBC, BMP





 09/11/17 05:00 





 09/11/17 05:00 








Physical Examination


Constitutional: Yes: No Distress, comfortable


Cardiovascular: Yes: Regular Rate and Rhythm


Respiratory: Yes: CTA Bilaterally


Gastrointestinal: Yes: Normal Bowel Sounds, Soft, Abdomen, Obese, Other (

multiple surgical scars).  No: Distention, Tenderness


Edema: TRACE


Psychiatric: Yes: Alert,Awake





A/P





Gi Bleed-- s/p transfusion


Gi consult noted/ appreciated


Monitor.


conservative treatment.


transfuse prn.


start on liquid diet- when cleared by Gi.


discussed with icu attending- Dr. Amador as well as GI- Dr. Martin also.


cr also better


will follow


cc time 35 min.

## 2017-09-11 NOTE — PN
Teaching Attending Note


Name of Resident: Chapito Sandhu


ATTENDING PHYSICIAN STATEMENT





I saw and evaluated the patient.


I reviewed the resident's note and discussed the case with the resident.


I agree with the resident's findings and plan as documented.








SUBJECTIVE:


Patient seen and examined in ICU.  Awake and alert.  Noted some blood in stool 

this AM.  


Slight reduction in H&H noted.  


No CP or SOB. 


No abdominal pain. 





 Intake & Output











 09/08/17 09/09/17 09/10/17 09/11/17





 23:59 23:59 23:59 23:59


 


Intake Total  350 1984 793.2


 


Balance  350 1984 793.2


 


Weight  216 lb 14.958 oz  








 Last Vital Signs











Temp Pulse Resp BP Pulse Ox


 


 98.4 F   49 L  19   130/51   100 


 


 09/11/17 10:00  09/11/17 10:00  09/11/17 10:00  09/11/17 10:00  09/11/17 09:00








Active Medications





Dextrose/Sodium Chloride (D5-Ns -)  1,000 mls @ 83 mls/hr IV ASDIR [facility]


   Last Admin: 09/10/17 15:30 Dose:  83 mls/hr


Insulin Aspart (Novolog Vial Sliding Scale -)  1 vial SQ BIDAC [facility]


   PRN Reason: Protocol


   Last Admin: 09/11/17 07:35 Dose:  Not Given


Pantoprazole Sodium (Protonix 40mg Ivpb (Pre-Docked))  40 mg IVPB BID E[facility]


   Last Admin: 09/11/17 09:53 Dose:  40 mg


Polyethylene Glycol (Miralax (For Daily Use) -)  17 gm PO DAILY [facility]








Constitutional: Yes: Awake and alert, No Distress


Cardiovascular: Yes: Regular Rate and Rhythm


Respiratory: Yes: CTA Bilaterally


Gastrointestinal: Yes: Normal Bowel Sounds, Soft, Abdomen, Obese.  No: 

Distention, Tenderness


Edema: Yes


Edema: LLE: 2+, RLE: 2+


Psychiatric: Yes: Alert, Oriented


Lab





 Laboratory Results - last 24 hr











  09/10/17 09/10/17 09/10/17





  14:42 14:42 15:26


 


WBC  6.6  


 


RBC  3.24 L D  


 


Hgb  9.3 L D  


 


Hct  28.1 L D  


 


MCV  86.7  


 


MCH  28.8  


 


MCHC  33.2  


 


RDW  16.6 H  


 


Plt Count  122 L  


 


MPV  9.6  


 


Neutrophils %  76.1  


 


Lymphocytes %  13.1  


 


Monocytes %  8.9  


 


Eosinophils %  1.2  


 


Basophils %  0.7  


 


INR   


 


Sodium   148 H 


 


Potassium   4.5 


 


Chloride   116 H 


 


Carbon Dioxide   23 


 


Anion Gap   9 


 


BUN   42 H 


 


Creatinine   1.8 H D 


 


POC Glucometer    124.87863


 


Random Glucose   97  D 


 


Calcium   8.4 L 














  09/10/17 09/11/17 09/11/17





  19:15 05:00 05:00


 


WBC  7.4  6.3 


 


RBC  3.31 L  3.04 L 


 


Hgb  9.6 L  8.9 L 


 


Hct  28.5 L  26.2 L 


 


MCV  86.2  86.3 


 


MCH  29.0  29.4 


 


MCHC  33.6  34.0 


 


RDW  16.2 H  16.3 H 


 


Plt Count  126 L  136 


 


MPV  9.8  9.9 


 


Neutrophils %  73.4  70.3 


 


Lymphocytes %  13.6  16.2 


 


Monocytes %  11.0 H  11.5 H 


 


Eosinophils %  1.1  1.3 


 


Basophils %  0.9  0.7 


 


INR    1.15 H


 


Sodium   


 


Potassium   


 


Chloride   


 


Carbon Dioxide   


 


Anion Gap   


 


BUN   


 


Creatinine   


 


POC Glucometer   


 


Random Glucose   


 


Calcium   














  09/11/17 09/11/17





  05:00 11:55


 


WBC   5.8


 


RBC   3.22 L


 


Hgb   9.1 L


 


Hct   28.2 L


 


MCV   87.6


 


MCH   28.4


 


MCHC   32.4


 


RDW   15.9 H


 


Plt Count   126 L


 


MPV   9.5


 


Neutrophils %   68.0


 


Lymphocytes %   17.5


 


Monocytes %   12.4 H


 


Eosinophils %   1.4


 


Basophils %   0.7


 


INR  


 


Sodium  147 H 


 


Potassium  4.2 


 


Chloride  119 H 


 


Carbon Dioxide  21 


 


Anion Gap  7 L 


 


BUN  34 H 


 


Creatinine  1.6 H 


 


POC Glucometer  


 


Random Glucose  120 H D 


 


Calcium  8.4 L 











Assessment/Plan


Gastrointestinal hemorrhage (Acute) 


(HFpEF) heart failure with preserved ejection fraction (Acute) 


Acute on chronic renal failure (Acute) 


Anemia (Acute) 


COPD (chronic obstructive pulmonary disease) (Acute) 


Diabetes mellitus (Acute) 


Diverticulosis (Acute) 


Gastrointestinal hemorrhage (Acute) 


Rectal bleeding (Acute) 


Renal insufficiency, mild (Acute) 


Venous insufficiency (chronic) (peripheral) (Acute) 








Normal transfusion thresholds -> Transfuse PRBCS to hemoglobin -> 7g/dl, and 

platelets -> 50K


Maintain IV access  


Follow Renal function 


O2 as needed


D/C PPI 


PO as tolerated when OK with GI





Dr Morse 


Critical care time spent in reviewing chart, evaluating patient and formulating 

plan - 35 minutes.

## 2017-09-11 NOTE — PN
Physical Exam: 


SUBJECTIVE: Patient seen and examined. Pleasant. Had one bm last night at 11pm 

which was bloody.








OBJECTIVE:





 Vital Signs











 Period  Temp  Pulse  Resp  BP Sys/Boothe  Pulse Ox


 


 Last 24 Hr  97.2 F-98.9 F  50-98  15-23  108-157/39-78  100-100











Constitutional: Yes: No Distress, Pallor


Cardiovascular: Yes: bradycardic


Respiratory: Yes: CTA Bilaterally


Gastrointestinal: Yes: Normal Bowel Sounds, Soft, Abdomen, Obese, Other (

multiple surgical scars).  No: Distention, Tenderness


Edema: Yes


Edema: LLE: 2+, RLE: 2+


Psychiatric: Yes: Alert, Oriented














 Laboratory Results - last 24 hr











  09/10/17 09/10/17 09/10/17





  08:20 09:24 14:42


 


WBC  Cancelled  6.9  6.6


 


Corrected WBC (auto)  Cancelled  


 


RBC  Cancelled  2.67 L  3.24 L D


 


Hgb  Cancelled  7.6 L  9.3 L D


 


Hct  Cancelled  23.4 L  28.1 L D


 


MCV  Cancelled  87.6  86.7


 


MCH  Cancelled  28.3  28.8


 


MCHC  Cancelled  32.3  33.2


 


RDW  Cancelled  17.0 H  16.6 H


 


Plt Count  Cancelled  125 L D  122 L


 


MPV  Cancelled  9.6  9.6


 


Neutrophils %  Cancelled  72.6  76.1


 


Lymphocytes %  Cancelled  13.9  D  13.1


 


Monocytes %  Cancelled  11.8 H  8.9


 


Eosinophils %  Cancelled  1.0  1.2


 


Basophils %  Cancelled  0.7  0.7


 


Platelet Estimate  Cancelled  


 


Platelet Comment  Cancelled  


 


RBC Morphology  Cancelled  


 


INR   


 


Sodium   


 


Potassium   


 


Chloride   


 


Carbon Dioxide   


 


Anion Gap   


 


BUN   


 


Creatinine   


 


POC Glucometer   


 


Random Glucose   


 


Calcium   














  09/10/17 09/10/17 09/10/17





  14:42 15:26 19:15


 


WBC    7.4


 


Corrected WBC (auto)   


 


RBC    3.31 L


 


Hgb    9.6 L


 


Hct    28.5 L


 


MCV    86.2


 


MCH    29.0


 


MCHC    33.6


 


RDW    16.2 H


 


Plt Count    126 L


 


MPV    9.8


 


Neutrophils %    73.4


 


Lymphocytes %    13.6


 


Monocytes %    11.0 H


 


Eosinophils %    1.1


 


Basophils %    0.9


 


Platelet Estimate   


 


Platelet Comment   


 


RBC Morphology   


 


INR   


 


Sodium  148 H  


 


Potassium  4.5  


 


Chloride  116 H  


 


Carbon Dioxide  23  


 


Anion Gap  9  


 


BUN  42 H  


 


Creatinine  1.8 H D  


 


POC Glucometer   124.16986 


 


Random Glucose  97  D  


 


Calcium  8.4 L  














  09/11/17 09/11/17 09/11/17





  05:00 05:00 05:00


 


WBC  6.3  


 


Corrected WBC (auto)   


 


RBC  3.04 L  


 


Hgb  8.9 L  


 


Hct  26.2 L  


 


MCV  86.3  


 


MCH  29.4  


 


MCHC  34.0  


 


RDW  16.3 H  


 


Plt Count  136  


 


MPV  9.9  


 


Neutrophils %  70.3  


 


Lymphocytes %  16.2  


 


Monocytes %  11.5 H  


 


Eosinophils %  1.3  


 


Basophils %  0.7  


 


Platelet Estimate   


 


Platelet Comment   


 


RBC Morphology   


 


INR   1.15 H 


 


Sodium    147 H


 


Potassium    4.2


 


Chloride    119 H


 


Carbon Dioxide    21


 


Anion Gap    7 L


 


BUN    34 H


 


Creatinine    1.6 H


 


POC Glucometer   


 


Random Glucose    120 H D


 


Calcium    8.4 L








Active Medications











Generic Name Dose Route Start Last Admin





  Trade Name Freq  PRN Reason Stop Dose Admin


 


Dextrose/Sodium Chloride  1,000 mls @ 83 mls/hr 09/09/17 16:15 09/10/17 15:30





  D5-Ns -  IV   83 mls/hr





  ASDIR E[facility]A   Administration


 


Insulin Aspart  1 vial 09/10/17 03:42 09/10/17 15:30





  Novolog Vial Sliding Scale -  SQ   Not Given





  BIDAC [facility]   





  Protocol   


 


Pantoprazole Sodium  40 mg 09/10/17 22:00  





  Protonix 40mg Ivpb (Pre-Docked)  IVPB   





  BID [facility]   











ASSESSMENT/PLAN:


The patient is a 87 year old female, with a significant past medical history of 

Asthma, DM, Rectal bleeding, HTN, HLD who presents to the emergency department 

with rectal bleeding since yesterday. Patient reports 2 episodes of bright red 

blood, with clots per rectum. Patient endorses associated dizziness and 

weakness after passing bowels. Hgb on admission was 7.4. Transfused 3 Units in 

total.





Hematology


 - Transfuse to Hgb 8.5 or higher if needed per GI


 - Trend CBC q6H


GI


 - If cbc keeps trending down at noon, consider colonoscopy or CTA abdomen per 

GI consult


 - May stop Protonix drip


 - Reconsider stopping NPO


 - Start daily Miralax as constipation possible cause of bleed.


Renal


 - D5NS IV, aim to correct BUN/Creatinine





DVT prophylaxis - scd











Visit type





- Emergency Visit


Emergency Visit: No





- New Patient


This patient is new to me today: Yes


Date on this admission: 09/11/17





- Critical Care


Critical Care patient: Yes


Total Critical Care Time (in minutes): 30


Critical Care Statement: The care of this patient involved high complexity 

decision making to prevent further life threatening deterioration of the patient

's condition and/or to evaluate & treat vital organ system(s) failure or risk 

of failure.

## 2017-09-12 LAB
DEPRECATED RDW RBC AUTO: 16.4 % (ref 11.6–15.6)
MCH RBC QN AUTO: 28.3 PG (ref 25.7–33.7)
MCHC RBC AUTO-ENTMCNC: 32.2 G/DL (ref 32–36)
MCV RBC: 88 FL (ref 80–96)
PLATELET # BLD AUTO: 125 K/MM3 (ref 134–434)
PMV BLD: 9.2 FL (ref 7.5–11.1)
WBC # BLD AUTO: 5.5 K/MM3 (ref 4–10)

## 2017-09-12 RX ADMIN — AMLODIPINE BESYLATE SCH MG: 10 TABLET ORAL at 22:23

## 2017-09-12 RX ADMIN — INSULIN ASPART SCH: 100 INJECTION, SOLUTION INTRAVENOUS; SUBCUTANEOUS at 16:53

## 2017-09-12 RX ADMIN — POLYETHYLENE GLYCOL 3350 SCH GM: 17 POWDER, FOR SOLUTION ORAL at 09:22

## 2017-09-12 RX ADMIN — INSULIN ASPART SCH: 100 INJECTION, SOLUTION INTRAVENOUS; SUBCUTANEOUS at 06:24

## 2017-09-12 NOTE — PN
GI Progress Note


Subjective: 


No acute events. Passage of a small amount of dark blood last night


No abdominal pain


No overt rectal bleeding


Small BM's today








- Objective


Vital Signs: 


 Vital Signs











Temperature  98.2 F   09/12/17 14:03


 


Pulse Rate  64   09/12/17 14:03


 


Respiratory Rate  20   09/12/17 10:29


 


Blood Pressure  151/69   09/12/17 14:03


 


O2 Sat by Pulse Oximetry (%)  100   09/11/17 09:00











Constitutional: Calm


Eyes: No: Sclera Icterus


Cardiovascular: Yes: Bradycardia, Murmur (systolic)


Respiratory: Yes: CTA Bilaterally


Gastrointestinal Inspection: No: Distention


...Auscultate: Yes: Normoactive Bowel Sounds


...Palpate: No: Tenderness


Edema: Yes


Edema: LLE: Trace, RLE: Trace


Neurological: Yes: Alert, Oriented


Labs: 


 CBC, BMP





 09/12/17 10:50 





 09/11/17 05:00 





 INR, PTT











INR  1.15  (0.82-1.09)  H  09/11/17  05:00    














Assessment/Plan


Suspected Diverticular Bleed:


Resolved


Clear liquids, if no further bleeding, advance as tolerated

## 2017-09-12 NOTE — PN
Progress Note, Physician


Chief Complaint: 


Had brown stool x 1 this AM


No abd pain 


No nausea


still NPO 








- Current Medication List


Current Medications: 


Active Medications





Dextrose/Sodium Chloride (D5-Ns -)  1,000 mls @ 83 mls/hr IV ASDIR [facility]


   Last Admin: 09/12/17 06:27 Dose:  83 mls/hr


Insulin Aspart (Novolog Vial Sliding Scale -)  1 vial SQ BIDAC [facility]


   PRN Reason: Protocol


   Last Admin: 09/12/17 06:24 Dose:  Not Given


Polyethylene Glycol (Miralax (For Daily Use) -)  17 gm PO DAILY [facility]


   Last Admin: 09/12/17 09:22 Dose:  17 gm











- Objective


Vital Signs: 


 Vital Signs











Temperature  98.4 F   09/12/17 08:59


 


Pulse Rate  56 L  09/12/17 08:59


 


Respiratory Rate  20   09/12/17 10:29


 


Blood Pressure  156/78   09/12/17 08:59


 


O2 Sat by Pulse Oximetry (%)  100   09/11/17 09:00











Constitutional: Yes: No Distress


Cardiovascular: Yes: Regular Rate and Rhythm


Respiratory: Yes: CTA Bilaterally


Gastrointestinal: Yes: Normal Bowel Sounds, Soft, Abdomen, Obese.  No: 

Tenderness


Edema: Yes


Labs: 


 CBC, BMP





 09/11/17 20:35 





 09/11/17 05:00 





 INR, PTT











INR  1.15  (0.82-1.09)  H  09/11/17  05:00    














Assessment/Plan


GI Bleeding


Symptomatic anemia


Diverticulosis


--


clears if ok with GI 


Protonix po 


iv fluids


DVT prophylaxis-- SCD


check CBC daily 


improving H/HCT 


PRBC as needed

## 2017-09-12 NOTE — PN
Progress Note (short form)





- Note


Progress Note: 


OOB to chair.  Has been tasking ice chips.  


Reports dark stool mixed with stool overnight. 


Says she felt a "little woozy" when she was ambulating to the bathroom. 


No CP or SOB. 





 Intake & Output











 09/09/17 09/10/17 09/11/17 09/12/17





 23:59 23:59 23:59 23:59


 


Intake Total 350 1984 1242.2 664


 


Balance 350 1984 1242.2 664


 


Weight 216 lb 14.958 oz  216 lb 4.375 oz 214 lb 6 oz








 Last Vital Signs











Temp Pulse Resp BP Pulse Ox


 


 98.4 F   56 L  20   156/78   100 


 


 09/12/17 08:59  09/12/17 08:59  09/12/17 08:59  09/12/17 08:59  09/11/17 09:00








Active Medications





Dextrose/Sodium Chloride (D5-Ns -)  1,000 mls @ 83 mls/hr IV ASDIR [facility]


   Last Admin: 09/12/17 06:27 Dose:  83 mls/hr


Insulin Aspart (Novolog Vial Sliding Scale -)  1 vial SQ BIDAC [facility]


   PRN Reason: Protocol


   Last Admin: 09/12/17 06:24 Dose:  Not Given


Polyethylene Glycol (Miralax (For Daily Use) -)  17 gm PO DAILY [facility]


   Last Admin: 09/12/17 09:22 Dose:  17 gm








Constitutional: Yes: Awake and alert, No Distress


Cardiovascular: Yes: Regular Rate and Rhythm


Respiratory: Yes: CTA Bilaterally


Gastrointestinal: Yes: Normal Bowel Sounds, Soft, Abdomen, Obese.  No: 

Distention, Tenderness


Edema: Yes


Edema: LLE: 2+, RLE: 2+


Psychiatric: Yes: Alert, Oriented


Lab





 


Assessment/Plan


Gastrointestinal hemorrhage (Acute) 


(HFpEF) heart failure with preserved ejection fraction (Acute) 


Acute on chronic renal failure (Acute) 


Anemia (Acute) 


COPD (chronic obstructive pulmonary disease) (Acute) 


Diabetes mellitus (Acute) 


Diverticulosis (Acute) 


Gastrointestinal hemorrhage (Acute) 


Rectal bleeding (Acute) 


Renal insufficiency, mild (Acute) 


Venous insufficiency (chronic) (peripheral) (Acute) 








Check CBC 


Normal transfusion thresholds -> Transfuse PRBCS to hemoglobin -> 7g/dl, and 

platelets -> 50K


Maintain IV access  


O2 as needed


PO when OK with GI





Dr Morse

## 2017-09-13 LAB
DEPRECATED RDW RBC AUTO: 16.2 % (ref 11.6–15.6)
MCH RBC QN AUTO: 28.6 PG (ref 25.7–33.7)
MCHC RBC AUTO-ENTMCNC: 32.6 G/DL (ref 32–36)
MCV RBC: 87.6 FL (ref 80–96)
PLATELET # BLD AUTO: 122 K/MM3 (ref 134–434)
PMV BLD: 9.4 FL (ref 7.5–11.1)
WBC # BLD AUTO: 5.7 K/MM3 (ref 4–10)

## 2017-09-13 RX ADMIN — FUROSEMIDE SCH MG: 40 TABLET ORAL at 14:10

## 2017-09-13 RX ADMIN — INSULIN ASPART SCH: 100 INJECTION, SOLUTION INTRAVENOUS; SUBCUTANEOUS at 17:08

## 2017-09-13 RX ADMIN — INSULIN ASPART SCH: 100 INJECTION, SOLUTION INTRAVENOUS; SUBCUTANEOUS at 06:34

## 2017-09-13 RX ADMIN — POLYETHYLENE GLYCOL 3350 SCH GM: 17 POWDER, FOR SOLUTION ORAL at 09:25

## 2017-09-13 RX ADMIN — AMLODIPINE BESYLATE SCH MG: 10 TABLET ORAL at 09:25

## 2017-09-13 RX ADMIN — PANTOPRAZOLE SODIUM SCH MG: 40 TABLET, DELAYED RELEASE ORAL at 09:25

## 2017-09-13 NOTE — PN
Progress Note (short form)





- Note


Progress Note: 


PULMONARY





LYING COMFORTABLY/DOESN'T APPEAR SOB





VSS/AFEBRILE





ANICTERIC


DIMINISHED BREATH SOUNDS


S1S2


BS+ OBESE


MILD LOWER EXT EDEMA





LABS/MEDS/NOTES/IMAGING REVIEWED





GIB


CHF


CRF


ANEMIA


COPD


DM


VENOUS INSUFFICIENCY





AGREE WITH CURRENT PLAN


WILL ADD PRN ALBUTEROL CAYDEN [place]IN MD

## 2017-09-13 NOTE — PN
Progress Note, Physician


Chief Complaint: 


went to bathroom about 5 times and has been passing hard stools.


last episode saw some small amount of dark blood


SOB on ambulation








- Current Medication List


Current Medications: 


Active Medications





Albuterol Sulfate (Ventolin 0.083% Nebulizer Soln -)  1 amp NEB Q6H PRN


   PRN Reason: SHORT OF BREATH/WHEEZING


Amlodipine Besylate (Norvasc -)  10 mg PO DAILY [facility]


   Last Admin: 09/13/17 09:25 Dose:  10 mg


Insulin Aspart (Novolog Vial Sliding Scale -)  1 vial SQ BIDAC [facility]


   PRN Reason: Protocol


   Last Admin: 09/13/17 06:34 Dose:  Not Given


Pantoprazole Sodium (Protonix -)  40 mg PO DAILY [facility]


   Last Admin: 09/13/17 09:25 Dose:  40 mg


Polyethylene Glycol (Miralax (For Daily Use) -)  17 gm PO DAILY [facility]


   Last Admin: 09/13/17 09:25 Dose:  17 gm











- Objective


Vital Signs: 


 Vital Signs











Temperature  98.0 F   09/13/17 08:58


 


Pulse Rate  66   09/13/17 08:58


 


Respiratory Rate  21   09/13/17 08:58


 


Blood Pressure  131/54   09/13/17 08:58


 


O2 Sat by Pulse Oximetry (%)  95   09/13/17 09:00











Constitutional: Yes: No Distress


Cardiovascular: Yes: Regular Rate and Rhythm


Respiratory: Yes: Diminished


Gastrointestinal: Yes: Normal Bowel Sounds, Soft, Abdomen, Obese.  No: 

Distention, Tenderness


Edema: Yes


Labs: 


 CBC, BMP





 09/13/17 06:00 





 09/11/17 05:00 





 INR, PTT











INR  1.15  (0.82-1.09)  H  09/11/17  05:00    














Assessment/Plan


GI Bleeding


Symptomatic anemia


Diverticulosis


HTN


--


advance diet 


Protonix po 


iv fluids dc


DVT prophylaxis-- SCD


check CBC daily 


resume BP meds


start lasix once daily 


senna at night

## 2017-09-14 VITALS — TEMPERATURE: 98.9 F | HEART RATE: 69 BPM | DIASTOLIC BLOOD PRESSURE: 47 MMHG | SYSTOLIC BLOOD PRESSURE: 121 MMHG

## 2017-09-14 LAB
ANION GAP SERPL CALC-SCNC: 10 MMOL/L (ref 8–16)
CALCIUM SERPL-MCNC: 8.8 MG/DL (ref 8.5–10.1)
CO2 SERPL-SCNC: 19 MMOL/L (ref 21–32)
CREAT SERPL-MCNC: 1.4 MG/DL (ref 0.55–1.02)
DEPRECATED RDW RBC AUTO: 16.5 % (ref 11.6–15.6)
GLUCOSE SERPL-MCNC: 94 MG/DL (ref 74–106)
MCH RBC QN AUTO: 28.6 PG (ref 25.7–33.7)
MCHC RBC AUTO-ENTMCNC: 32.5 G/DL (ref 32–36)
MCV RBC: 88 FL (ref 80–96)
PLATELET # BLD AUTO: 141 K/MM3 (ref 134–434)
PLATELET # BLD EST: ADEQUATE 10*3/UL
PMV BLD: 9.5 FL (ref 7.5–11.1)
WBC # BLD AUTO: 7.8 K/MM3 (ref 4–10)

## 2017-09-14 RX ADMIN — POLYETHYLENE GLYCOL 3350 SCH GM: 17 POWDER, FOR SOLUTION ORAL at 10:03

## 2017-09-14 RX ADMIN — PANTOPRAZOLE SODIUM SCH MG: 40 TABLET, DELAYED RELEASE ORAL at 10:03

## 2017-09-14 RX ADMIN — INSULIN ASPART SCH: 100 INJECTION, SOLUTION INTRAVENOUS; SUBCUTANEOUS at 06:15

## 2017-09-14 RX ADMIN — FUROSEMIDE SCH MG: 40 TABLET ORAL at 10:04

## 2017-09-14 NOTE — PN
Progress Note (short form)





- Note


Progress Note: 


OOB to chair.  No occult bleeding overnight.  


No CP or SOB. 


 Intake & Output











 09/11/17 09/12/17 09/13/17 09/14/17





 23:59 23:59 23:59 23:59


 


Intake Total 1242.2 1794 1150 450


 


Balance 1242.2 1794 1150 450


 


Weight 216 lb 4.375 oz 214 lb 6 oz 214 lb 218 lb 3 oz








 Last Vital Signs











Temp Pulse Resp BP Pulse Ox


 


 99.0 F   66   20   126/51   95 


 


 09/14/17 06:00  09/14/17 06:00  09/14/17 06:00  09/14/17 06:00  09/13/17 21:00








Active Medications





Albuterol Sulfate (Ventolin 0.083% Nebulizer Soln -)  1 amp NEB Q6H PRN


   PRN Reason: SHORT OF BREATH/WHEEZING


Amlodipine Besylate (Norvasc -)  5 mg PO DAILY [facility]


   Last Admin: 09/14/17 10:04 Dose:  5 mg


Furosemide (Lasix -)  40 mg PO DAILY [facility]


   Last Admin: 09/14/17 10:04 Dose:  40 mg


Insulin Aspart (Novolog Vial Sliding Scale -)  1 vial SQ BIDAC [facility]


   PRN Reason: Protocol


   Last Admin: 09/14/17 06:15 Dose:  Not Given


Pantoprazole Sodium (Protonix -)  40 mg PO DAILY [facility]


   Last Admin: 09/14/17 10:03 Dose:  40 mg


Polyethylene Glycol (Miralax (For Daily Use) -)  17 gm PO DAILY [facility]


   Last Admin: 09/14/17 10:03 Dose:  17 gm


Senna (Senna -)  2 tab PO HS PRN


   PRN Reason: CONSTIPATION


   Last Admin: 09/14/17 00:46 Dose:  2 tab








Constitutional: Yes: Awake and alert, No Distress


Cardiovascular: Yes: Regular Rate and Rhythm


Respiratory: Yes: CTA Bilaterally


Gastrointestinal: Yes: Normal Bowel Sounds, Soft, Abdomen, Obese.  No: 

Distention, Tenderness


Edema: Yes


Edema: LLE: 2+, RLE: 2+


Psychiatric: Yes: Alert, Oriented


Lab





 Laboratory Results - last 24 hr











  09/09/17 09/13/17 09/14/17





  14:40 16:13 05:57


 


WBC   


 


RBC   


 


Hgb   


 


Hct   


 


MCV   


 


MCH   


 


MCHC   


 


RDW   


 


MPV   


 


Sodium   


 


Potassium   


 


Chloride   


 


Carbon Dioxide   


 


Anion Gap   


 


BUN   


 


Creatinine   


 


POC Glucometer   123  105


 


Random Glucose   


 


Calcium   


 


Blood Type  O POSITIVE  


 


Antibody Screen  Negative  


 


Crossmatch  See Detail  


 


Spec Expiration Date    














  09/14/17 09/14/17





  08:00 08:00


 


WBC  7.8  D 


 


RBC  3.08 L 


 


Hgb  8.8 L 


 


Hct  27.2 L 


 


MCV  88.0 


 


MCH  28.6 


 


MCHC  32.5 


 


RDW  16.5 H 


 


MPV  9.5 


 


Sodium   144


 


Potassium   4.3


 


Chloride   115 H


 


Carbon Dioxide   19 L


 


Anion Gap   10


 


BUN   17  D


 


Creatinine   1.4 H


 


POC Glucometer  


 


Random Glucose   94  D


 


Calcium   8.8


 


Blood Type  


 


Antibody Screen  


 


Crossmatch  


 


Spec Expiration Date  











 


Assessment/Plan


Gastrointestinal hemorrhage (Acute) 


(HFpEF) heart failure with preserved ejection fraction (Acute) 


Acute on chronic renal failure (Acute) 


Anemia (Acute) 


COPD (chronic obstructive pulmonary disease) (Acute) 


Diabetes mellitus (Acute) 


Diverticulosis (Acute) 


Gastrointestinal hemorrhage (Acute) 


Rectal bleeding (Acute) 


Renal insufficiency, mild (Acute) 


Venous insufficiency (chronic) (peripheral) (Acute) 





Normal transfusion thresholds -> Transfuse PRBCS to hemoglobin -> 7g/dl, and 

platelets -> 50K


O2 as needed


PO as tolerated 





Dr Morse

## 2017-09-14 NOTE — DS
Physical Examination


Vital Signs: 


 Vital Signs











Temperature  99.0 F   09/14/17 06:00


 


Pulse Rate  66   09/14/17 06:00


 


Respiratory Rate  20   09/14/17 06:00


 


Blood Pressure  126/51   09/14/17 06:00


 


O2 Sat by Pulse Oximetry (%)  95   09/13/17 21:00











Constitutional: Yes: No Distress


Cardiovascular: Yes: Regular Rate and Rhythm


Respiratory: Yes: CTA Bilaterally


Gastrointestinal: Yes: Normal Bowel Sounds, Soft, Abdomen, Obese.  No: 

Distention, Tenderness


Edema: Yes


Edema: LLE: 1+, RLE: 1+


Neurological: Yes: Alert, Oriented


Psychiatric: Yes: Alert, Oriented


Labs: 


 CBC, BMP





 09/14/17 08:00 





 09/14/17 08:00 











Discharge Summary


Reason For Visit: RECTAL BLEED


Hospital Course: 


Admitted for rectal bleeding- was in ICU initially for monitoring and 

transfusion. 


Pt was seen by ICU team, GI 


No interventions for bleeding as it stopped on its own-likely diverticulosis


Vitals stable


She was transferred to regular floor for further care. No further bleeding . H/

HCT stable


CT abd/pelvis--scattered diverticuli


continue with meds


 stable for dc home 


had soft stools today -- no blood 


Condition: Improved





- Instructions


Referrals: 


Jace Mckee MD [Primary Care Provider] - 


Disposition: HOME





- Home Medications


Comprehensive Discharge Medication List: 


Ambulatory Orders





Allopurinol [Zyloprim -] 100 mg PO DAILY 07/19/14 


Ascorbic Acid [Vitamin C] 0 mg PO DAILY 07/19/14 


Budesonide/Formeterol Fumarate [SYMBICORT 160/4.5mcg -] 2 inh PO BID 07/19/14 


Gabapentin 200 mg PO TID 07/19/14 


Simvastatin [Zocor -] 40 mg PO HS 07/19/14 


Valsartan [Diovan] 320 mg PO DAILY 07/19/14 


Acetaminophen [Tylenol .Extra-Strength -] 1,000 mg PO Q8H PRN #100 tablet 07/21/ 14 


Amlodipine Besylate [Norvasc -] 2.5 mg PO DAILY 11/14/15 


Furosemide [Lasix -] 80 mg PO BID 11/04/16 


Glipizide 5 mg PO BID 09/09/17

## 2017-09-14 NOTE — PN
Progress Note, Physician


Chief Complaint: 


see dc summary 





- Current Medication List


Current Medications: 


Active Medications





Albuterol Sulfate (Ventolin 0.083% Nebulizer Soln -)  1 amp NEB Q6H PRN


   PRN Reason: SHORT OF BREATH/WHEEZING


Amlodipine Besylate (Norvasc -)  5 mg PO DAILY [facility]


Furosemide (Lasix -)  40 mg PO DAILY [facility]


   Last Admin: 09/13/17 14:10 Dose:  40 mg


Insulin Aspart (Novolog Vial Sliding Scale -)  1 vial SQ BIDAC [facility]


   PRN Reason: Protocol


   Last Admin: 09/14/17 06:15 Dose:  Not Given


Pantoprazole Sodium (Protonix -)  40 mg PO DAILY [facility]


   Last Admin: 09/13/17 09:25 Dose:  40 mg


Polyethylene Glycol (Miralax (For Daily Use) -)  17 gm PO DAILY [facility]


   Last Admin: 09/13/17 09:25 Dose:  17 gm


Senna (Senna -)  2 tab PO HS PRN


   PRN Reason: CONSTIPATION


   Last Admin: 09/14/17 00:46 Dose:  2 tab











- Objective


Vital Signs: 


 Vital Signs











Temperature  99.0 F   09/14/17 06:00


 


Pulse Rate  66   09/14/17 06:00


 


Respiratory Rate  20   09/14/17 06:00


 


Blood Pressure  126/51   09/14/17 06:00


 


O2 Sat by Pulse Oximetry (%)  95   09/13/17 21:00











Labs: 


 CBC, BMP





 09/14/17 08:00 





 09/14/17 08:00 





 INR, PTT











INR  1.15  (0.82-1.09)  H  09/11/17  05:00

## 2018-02-06 ENCOUNTER — HOSPITAL ENCOUNTER (INPATIENT)
Dept: HOSPITAL 74 - JER | Age: 83
LOS: 13 days | Discharge: HOME | DRG: 378 | End: 2018-02-19
Attending: INTERNAL MEDICINE | Admitting: INTERNAL MEDICINE
Payer: COMMERCIAL

## 2018-02-06 VITALS — BODY MASS INDEX: 38.7 KG/M2

## 2018-02-06 DIAGNOSIS — I12.9: ICD-10-CM

## 2018-02-06 DIAGNOSIS — K64.8: ICD-10-CM

## 2018-02-06 DIAGNOSIS — R00.1: ICD-10-CM

## 2018-02-06 DIAGNOSIS — N17.9: ICD-10-CM

## 2018-02-06 DIAGNOSIS — E11.649: ICD-10-CM

## 2018-02-06 DIAGNOSIS — N18.9: ICD-10-CM

## 2018-02-06 DIAGNOSIS — I87.2: ICD-10-CM

## 2018-02-06 DIAGNOSIS — K59.00: ICD-10-CM

## 2018-02-06 DIAGNOSIS — K57.31: Primary | ICD-10-CM

## 2018-02-06 DIAGNOSIS — K63.5: ICD-10-CM

## 2018-02-06 DIAGNOSIS — K29.80: ICD-10-CM

## 2018-02-06 DIAGNOSIS — E11.22: ICD-10-CM

## 2018-02-06 DIAGNOSIS — D64.9: ICD-10-CM

## 2018-02-06 DIAGNOSIS — K92.2: ICD-10-CM

## 2018-02-06 DIAGNOSIS — R07.9: ICD-10-CM

## 2018-02-06 LAB
ALBUMIN SERPL-MCNC: 3.4 G/DL (ref 3.4–5)
ALP SERPL-CCNC: 88 U/L (ref 45–117)
ALT SERPL-CCNC: 20 U/L (ref 12–78)
ANION GAP SERPL CALC-SCNC: 7 MMOL/L (ref 8–16)
APTT BLD: 25.4 SECONDS (ref 26.9–34.4)
AST SERPL-CCNC: 20 U/L (ref 15–37)
BASOPHILS # BLD: 0.8 % (ref 0–2)
BILIRUB SERPL-MCNC: 0.3 MG/DL (ref 0.2–1)
BUN SERPL-MCNC: 38 MG/DL (ref 7–18)
CALCIUM SERPL-MCNC: 8.6 MG/DL (ref 8.5–10.1)
CHLORIDE SERPL-SCNC: 109 MMOL/L (ref 98–107)
CO2 SERPL-SCNC: 30 MMOL/L (ref 21–32)
CREAT SERPL-MCNC: 2.3 MG/DL (ref 0.55–1.02)
DEPRECATED RDW RBC AUTO: 18 % (ref 11.6–15.6)
EOSINOPHIL # BLD: 1.3 % (ref 0–4.5)
GLUCOSE SERPL-MCNC: 46 MG/DL (ref 74–106)
HCT VFR BLD CALC: 24.5 % (ref 32.4–45.2)
HGB BLD-MCNC: 7.7 GM/DL (ref 10.7–15.3)
INR BLD: 1.08 (ref 0.82–1.09)
LYMPHOCYTES # BLD: 17.1 % (ref 8–40)
MCH RBC QN AUTO: 27.2 PG (ref 25.7–33.7)
MCHC RBC AUTO-ENTMCNC: 31.3 G/DL (ref 32–36)
MCV RBC: 86.9 FL (ref 80–96)
MONOCYTES # BLD AUTO: 8 % (ref 3.8–10.2)
NEUTROPHILS # BLD: 72.8 % (ref 42.8–82.8)
PLATELET # BLD AUTO: 159 K/MM3 (ref 134–434)
PMV BLD: 7.9 FL (ref 7.5–11.1)
POTASSIUM SERPLBLD-SCNC: 4.3 MMOL/L (ref 3.5–5.1)
PROT SERPL-MCNC: 6.9 G/DL (ref 6.4–8.2)
PT PNL PPP: 12.2 SEC (ref 9.98–11.88)
RBC # BLD AUTO: 2.82 M/MM3 (ref 3.6–5.2)
SODIUM SERPL-SCNC: 146 MMOL/L (ref 136–145)
WBC # BLD AUTO: 6.3 K/MM3 (ref 4–10)

## 2018-02-06 PROCEDURE — A9502 TC99M TETROFOSMIN: HCPCS

## 2018-02-06 PROCEDURE — 30233N1 TRANSFUSION OF NONAUTOLOGOUS RED BLOOD CELLS INTO PERIPHERAL VEIN, PERCUTANEOUS APPROACH: ICD-10-PCS

## 2018-02-06 PROCEDURE — P9058 RBC, L/R, CMV-NEG, IRRAD: HCPCS

## 2018-02-06 PROCEDURE — P9038 RBC IRRADIATED: HCPCS

## 2018-02-06 RX ADMIN — POLYETHYLENE GLYCOL 3350 SCH GM: 17 POWDER, FOR SOLUTION ORAL at 22:12

## 2018-02-06 RX ADMIN — PANTOPRAZOLE SODIUM SCH MG: 40 INJECTION, POWDER, FOR SOLUTION INTRAVENOUS at 22:12

## 2018-02-06 NOTE — EKG
Test Reason : 

Blood Pressure : ***/*** mmHG

Vent. Rate : 055 BPM     Atrial Rate : 055 BPM

   P-R Int : 148 ms          QRS Dur : 084 ms

    QT Int : 436 ms       P-R-T Axes : 046 -45 050 degrees

   QTc Int : 417 ms

 

SINUS BRADYCARDIA

LEFT ANTERIOR FASCICULAR BLOCK

NONSPECIFIC T WAVE ABNORMALITY

ABNORMAL ECG

WHEN COMPARED WITH ECG OF 02-NOV-2017 17:30,

CURRENT UNDETERMINED RHYTHM PRECLUDES RHYTHM COMPARISON, NEEDS REVIEW

Confirmed by Khurrma Thompson ([id number]) on 2/6/2018 5:31:38 PM

 

Referred By:             Confirmed By:Khurram Thompson

## 2018-02-06 NOTE — CON.GI
Consult


Consult Specialty:: Gastroenterology


Referred by:: Dr Covington


Reason for Consultation:: Rectal bleeding





- History of Present Illness


Chief Complaint: Rectal bleeding x 2 days


History of Present Illness: 





87F had 2 episodes of painless hematochezia yesterday morning and then again 

today. No pain or vomiting. She had similar bleeding when last seen here in 

consultation on 17. It was self limited and she was discharged with a Hb 

of 8. She admits to having stopped her Miralax several weeks ago and has 

developed constipation that led to this bleeding.  She last had an EGD and a 

colonoscopy on 16 with Dr. Shea when moderate universal diverticulosis 

was noted with retained blood. EGD revealed eosphageal candidiasis, antral 

erosions and moderate duodenitis. She denies any nausea or vomiting. She had a 

right colon polyp removed by Dr. Pantera Sinclair remotely.  She had a partial 

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 

left with incisional hernias.   Her mother had rectal cancer. 





- History Source


History Provided By: Patient, Medical Record


Limitations to Obtaining History: No Limitations





- Past Medical History


CNS: Yes: Other (chronic headache with negative temporal artery biopsy)


Cardio/Vascular: Yes: CHF, HTN, Hyperlipdemia


Pulmonary: Yes: COPD


Gastrointestinal: Yes: Constipation, Diverticulosis, Gastritis, GI Bleed, 

Hemorrhoids, Other (colon polyp , ventral incisional hernias, esophageal 

candidiasis )


Renal/: Yes: Renal Inusuff (diabetic nephropathy)


Endocrine: Yes: Diabetes Mellitus, Other (diabetic retinopathy and nephropathy)





- Past Surgical History


Past Surgical History: Yes: Appendectomy, Breast Biopsy, Cataract Removal, 

Colonoscopy (diverticular bleed 2016, right colon polyp removed remotely ), 

Hernia Repair, Hysterectomy, Oopherectomy, Tonsillectomy, Upper Endoscopy (2016 

with esophageal candidiasis, gastritis and duodenitis)





- Alcohol/Substance Use


Hx Alcohol Use: No


History of Substance Use: reports: None





- Smoking History


Smoking history: Never smoked


Have you smoked in the past 12 months: No


Aproximately how many cigarettes per day: 0





- Social History


Usual Living Arrangement: Alone


ADL: Independent


Occupation: retired 


Place of Birth: [place]


History of Recent Travel: No





Home Medications





- Allergies


Allergies/Adverse Reactions: 


 Allergies











Allergy/AdvReac Type Severity Reaction Status Date / Time


 


diphenhydramine HCl Allergy Severe anaphyl Verified 18 11:14





[From Benadryl]     


 


aspirin Allergy Unknown Swelling Verified 18 11:14


 


natty flavor AdvReac Severe Swelling Verified 18 11:14














- Home Medications


Home Medications: 


Ambulatory Orders





Allopurinol [Zyloprim -] 100 mg PO DAILY 14 


Budesonide/Formeterol Fumarate [SYMBICORT 160/4.5mcg -] 2 inh PO BID 14 


Gabapentin 200 mg PO TID 14 


Simvastatin [Zocor -] 40 mg PO HS 14 


Glipizide 5 mg PO BID 17 


Sennosides [Senna -] 2 tab PO HS PRN #60 tablet 17 


Furosemide [Lasix -] 80 mg PO DAILY 17 


Amlodipine Besylate [Norvasc -] 2.5 mg PO DAILY 18 


Valsartan 320 mg PO DAILY 18 











Family Disease History





- Family Disease History


Family Disease History: Heart Disease: Father ( MI in his 50's), CA: Mother 

(rectal cancer)





Review of Systems





- Review of Systems


Constitutional: reports: No Symptoms


Eyes: reports: No Symptoms


HENT: reports: No Symptoms


Neck: reports: No Symptoms


Cardiovascular: reports: No Symptoms


Respiratory: reports: Cough, Exercise Intolerance


Gastrointestinal: reports: Constipation, Rectal Bleeding


Genitourinary: reports: Frequency


Musculoskeletal: reports: Back Pain


Neurological: reports: Numbness





Physical Exam-GI


Vital Signs: 


 Vital Signs











Temperature  98.0 F   18 17:05


 


Pulse Rate  63   18 17:05


 


Respiratory Rate  20   18 17:05


 


Blood Pressure  158/78   18 17:05


 


O2 Sat by Pulse Oximetry (%)  98   18 16:25








CBC,CMP











WBC  6.3 K/mm3 (4.0-10.0)   18  12:31    


 


RBC  2.82 M/mm3 (3.60-5.2)  L  18  12:31    


 


Hgb  7.7 GM/dL (10.7-15.3)  L  18  12:31    


 


Hct  24.5 % (32.4-45.2)  L  18  12:31    


 


MCV  86.9 fl (80-96)   18  12:31    


 


MCH  27.2 pg (25.7-33.7)   18  12:31    


 


MCHC  31.3 g/dl (32.0-36.0)  L  18  12:31    


 


RDW  18.0 % (11.6-15.6)  H  18  12:31    


 


Plt Count  159 K/MM3 (134-434)   18  12:31    


 


MPV  7.9 fl (7.5-11.1)  D 18  12:31    


 


Neutrophils %  72.8 % (42.8-82.8)   18  12:31    


 


Lymphocytes %  17.1 % (8-40)   18  12:31    


 


Monocytes %  8.0 % (3.8-10.2)   18  12:31    


 


Eosinophils %  1.3 % (0-4.5)   18  12:31    


 


Basophils %  0.8 % (0-2.0)   18  12:31    


 


Sodium  146 mmol/L (136-145)  H  18  12:31    


 


Potassium  4.3 mmol/L (3.5-5.1)   18  12:31    


 


Chloride  109 mmol/L ()  H  18  12:31    


 


Carbon Dioxide  30 mmol/L (21-32)   18  12:31    


 


Anion Gap  7  (8-16)  L  18  12:31    


 


BUN  38 mg/dL (7-18)  H  18  12:31    


 


Creatinine  2.3 mg/dL (0.55-1.02)  H  18  12:31    


 


Creat Clearance w eGFR  20.06  (>60)   18  12:31    


 


POC Glucometer  90 UNITS ()   18  18:34    


 


Random Glucose  46 mg/dL ()  L*  18  12:31    


 


Calcium  8.6 mg/dL (8.5-10.1)   18  12:31    


 


Total Bilirubin  0.3 mg/dL (0.2-1.0)  D 18  12:31    


 


AST  20 U/L (15-37)   18  12:31    


 


ALT  20 U/L (12-78)   18  12:31    


 


Alkaline Phosphatase  88 U/L ()   18  12:31    


 


Total Protein  6.9 g/dl (6.4-8.2)   18  12:31    


 


Albumin  3.4 g/dl (3.4-5.0)   18  12:31    








 Current Medications











Generic Name Dose Route Start Last Admin





  Trade Name aSmir  PRN Reason Stop Dose Admin


 


Dextrose/Sodium Chloride  1,000 mls @ 83 mls/hr  18 17:45  





  D5-Ns -  IV   





  ASDIR [facility]   


 


Insulin Aspart  1 vial  18 07:00  





  Novolog Vial Sliding Scale -  SQ   





  TIDAC [facility]   





  Protocol   


 


Pantoprazole Sodium  40 mg  18 17:45  





  Protonix Iv  IVPUSH   





  DAILY [facility]   











Constitutional: Yes: Calm


Eyes: Yes: Conjunctiva Clear


HENT: Yes: Normocephalic


Neck: Yes: Trachea Midline


Cardiovascular: Yes: Regular Rate and Rhythm


Respiratory: Yes: CTA Bilaterally


Gastrointestinal Inspection: Yes: Scars (multiple incisons with nontender 

incisional hernias including RLQ,midline and oblique LLQ/suprapubic deformed 

incisions)


...Auscultate: Yes: Normoactive Bowel Sounds


...Palpate: Yes: Soft (poor abdominal wall musculature)


...Rectal Exam: Yes: Guaiac Positive (brown but guaiac positive formed stool)


Labs: 


 CBC, BMP





 18 12:31 





 18 12:31 





 INR, PTT











INR  1.08  (0.82-1.09)   18  12:31    














Problem List





- Problems


(1) Lower GI bleeding


Assessment/Plan: 


Given the intermittent nature of bleeding which occurs only with defecation and 

the relatively small drop in Hb I suspect that Maria C's bleeding is 

hemorrhoidal as it was in . I have offered colonoscopy with the intent of 

performing rubber band ligation of internal hemorrhoids if these are found to 

be the source. I also advised an EGD to exclude a UGI source of bleeding as 

contributing to her anemia. Maria C cites her age and does not want any more 

endoscopies done. I have therefore encouraged her to take Miralax daily to try 

to avoid hard traumatic stools that set off hemorrhoidal bleeding in 

conjunction with a higher fiber diet. If bleeding persists or worsens will 

revisit the colonoscopy issue. I will advance her diet as the bleeding appears 

to be self limited ( no bleeding since the AM). 


Code(s): K92.2 - GASTROINTESTINAL HEMORRHAGE, UNSPECIFIED   





(2) Diverticulosis


Code(s): K57.90 - DVRTCLOS OF INTEST, PART UNSP, W/O PERF OR ABSCESS W/O BLEED 

  


Qualifiers: 


   Diverticulosis site: diverticulosis of large intestine   Diverticulosis 

bleeding: diverticulosis with bleeding   Qualified Code(s): K57.31 - 

Diverticulosis of large intestine without perforation or abscess with bleeding 

  





(3) Anemia


Code(s): D64.9 - ANEMIA, UNSPECIFIED   





(4) Colon polyp


Code(s): K63.5 - POLYP OF COLON   





(5) Constipation


Code(s): K59.00 - CONSTIPATION, UNSPECIFIED   


Qualifiers: 


   Constipation type: slow transit constipation   Qualified Code(s): K59.01 - 

Slow transit constipation

## 2018-02-06 NOTE — PDOC
History of Present Illness





<Garth Weaver - Last Filed: 02/06/18 15:37>





- General


History Source: Patient


Exam Limitations: No Limitations





- History of Present Illness


Initial Comments: 





02/06/18 12:55


The patient is a 87 year old female, with a significant past medical history of 

diabetes mellitus, CKD, COPD, anemia, hyperkalemia, hypertension, gout, 

diverticulosis, who presents to the emergency department with, approx. 3 

episodes of bloody stool for the past 2 days. The patient reports that early 

Monday morning around 3am she noted bright red blood in her stool after having 

a bowel movement. The patient reports that she used the bathroom again at 9am 

the following morning and noted continued bright red blood in her stool. The 

patient reports that earlier this morning at around 7am she had another bowel 

movement where she noted bright red blood clots in her stool. The patient 

reports her last colonoscopy was in November of 2016. The patient reports she 

is not currently on blood thinners. She denies abdominal pain and denies pain 

while having a bowel movement. She denies chest pain, palpations or 

lightheadedness. She denies syncope or diaphoresis. 





She denies recent fevers, chills, headache or dizziness. She denies recent 

nausea, vomit, or constipation. She denies recent  dysuria, frequency, urgency 

or hematuria. She denies recent shortness of breath.





Allergies: Multiple allergies. See nursing notes. 


Past surgical history: None reported. 


Primary Care Physician: Dr. Mckee 








<Kiran Bolden - Last Filed: 02/06/18 15:49>





- General


Chief Complaint: Rectal Bleed


Stated Complaint: RECTAL BLEED


Time Seen by Provider: 02/06/18 12:03





Past History





- Past Medical History


Anemia: No


Asthma: Yes


Cancer: No


Cardiac Disorders: No


CVA: No


COPD: Yes


CHF: No


Dementia: No


Diabetes: Yes


GI Disorders: Yes


 Disorders: No


HTN: Yes


Hypercholesterolemia: Yes


Liver Disease: No


Seizures: No


Thyroid Disease: No





- Surgical History


Abdominal Surgery: Yes (hernia repairs, small bowel resection,debride abd wall 

abscess,)


Appendectomy: Yes


Cardiac Surgery: No


Cholecystectomy: No


Lung Surgery: No


Neurologic Surgery: No


Orthopedic Surgery: No





- Immunization History


Immunization Up to Date: Yes





- Suicide/Smoking/Psychosocial Hx


Smoking Status: No


Smoking History: Never smoked


Have you smoked in the past 12 months: No


Number of Cigarettes Smoked Daily: 0


Hx Alcohol Use: No


Drug/Substance Use Hx: No


Substance Use Type: None


Hx Substance Use Treatment: No





<Garth Weaver - Last Filed: 02/06/18 15:37>





<Kiran Bolden - Last Filed: 02/06/18 15:49>





- Past Medical History


Allergies/Adverse Reactions: 


 Allergies











Allergy/AdvReac Type Severity Reaction Status Date / Time


 


diphenhydramine HCl Allergy Severe anaphyl Verified 02/06/18 11:14





[From Benadryl]     


 


aspirin Allergy Unknown Swelling Verified 02/06/18 11:14


 


natty flavor AdvReac Severe Swelling Verified 02/06/18 11:14











Home Medications: 


Ambulatory Orders





Allopurinol [Zyloprim -] 100 mg PO DAILY 07/19/14 


Budesonide/Formeterol Fumarate [SYMBICORT 160/4.5mcg -] 2 inh PO BID 07/19/14 


Gabapentin 200 mg PO TID 07/19/14 


Simvastatin [Zocor -] 40 mg PO HS 07/19/14 


Glipizide 5 mg PO BID 09/09/17 


Sennosides [Senna -] 2 tab PO HS PRN #60 tablet 09/14/17 


Furosemide [Lasix -] 80 mg PO DAILY 11/02/17 


Amlodipine Besylate [Norvasc -] 2.5 mg PO DAILY 02/06/18 


Valsartan 320 mg PO DAILY 02/06/18 











**Review of Systems





- Review of Systems


Constitutional: No: Chills, Fever


Respiratory: No: Shortness of Breath


Cardiac (ROS): No: Chest Pain, Palpitations, Syncope


ABD/GI: Yes: See HPI, Blood Streaked Bowels.  No: Diarrhea, Vomiting


Integumentary: No: Bruising, Rash


All Other Systems: Reviewed and Negative





<Garth Weaver - Last Filed: 02/06/18 15:37>





*Physical Exam





- Vital Signs


 Last Vital Signs











Temp Pulse Resp BP Pulse Ox


 


 98.9 F   63   26 H  140/56   98 


 


 02/06/18 11:15  02/06/18 11:15  02/06/18 11:15  02/06/18 11:15  02/06/18 11:15














<Garth Weaver - Last Filed: 02/06/18 15:37>





- Vital Signs


 Last Vital Signs











Temp Pulse Resp BP Pulse Ox


 


 98.9 F   63   26 H  140/56   98 


 


 02/06/18 11:15  02/06/18 11:15  02/06/18 11:15  02/06/18 11:15  02/06/18 11:15














- Physical Exam


Comments: 





02/06/18 12:58


GENERAL: The patient is awake, alert, and fully oriented, in no acute distress.


HEAD: Normal with no signs of trauma.


EYES: Pupils equal, round and reactive to light, extraocular movements intact, 

sclera anicteric, conjunctiva clear with no pallor.


ENT: Ears normal, nares patent, oropharynx clear without exudates.  Moist 

mucous membranes.


NECK: Normal range of motion, supple without lymphadenopathy, JVD, or masses.


LUNGS: Breath sounds equal, clear to auscultation bilaterally.  No wheeze/

crackles.


HEART: Regular rate and rhythm, normal S1 and S2 without murmur or rub.


ABDOMEN: Soft/nontender/nondistended. BS wnl.  No guarding or rebound.  No 

palpable masses. No hepatosplenomegaly.


RECTAL: +Slight mucous light brown stool and specs of blood.


 EXTREMITIES: Normal range of motion, no edema.  No clubbing or cyanosis. No 

cords, erythema, or tenderness.


NEUROLOGICAL: Cranial nerves II through XII grossly intact.  Normal speech.


PSYCH: Normal mood, normal affect.


SKIN: Warm, Dry, normal turgor, no rashes or lesions noted.








<Kiran Bolden - Last Filed: 02/06/18 15:49>





**Heart Score/ECG Review


  ** #1


ECG reviewed & interpreted by me at: 13:09


General ECG Interpretation: Sinus Rhythm, Normal Rate (55), Normal Intervals (

qtc 417, LAFB), No acute ischemic changes (biphasic T V4-V6)





<Garth Weaver - Last Filed: 02/06/18 15:37>





ED Treatment Course





- LABORATORY


CBC & Chemistry Diagram: 


 02/06/18 12:31





 02/06/18 12:31





- RADIOLOGY


Radiology Studies Ordered: 














 Category Date Time Status


 


 CHEST X-RAY PORTABLE* [RAD] Stat Radiology  02/06/18 12:28 Ordered














<Garth Weaver - Last Filed: 02/06/18 15:37>





- LABORATORY


CBC & Chemistry Diagram: 


 02/06/18 12:31





 02/06/18 12:31





- ADDITIONAL ORDERS


Additional order review: 


 Laboratory  Results











  02/06/18





  12:38


 


Stool Occult Blood  Negative














- RADIOLOGY


Radiograph Interpretation: 





02/06/18 13:59


EXAM#:     TYPE/EXAM: RESULT: 


[id number] RAD/CHEST X-RAY PORTABLE* 


Chest portable one view 


 


 Clinical information: weakness 


 


 No definite interval change is noted in comparison to a previous radiographic 

study of 11/2/2017. 


 


 There is no obvious infiltrate or pleural effusion. 


 


 Minimal to mild left lower lung field linear parenchymal scarring is seen. 


 


 Enlargement of the cardiac silhouette is again noted which is probably mild to 

moderate. 


 


 The mediastinum and alanis demonstrate no obvious abnormality. Atherosclerotic 

aortic changes are 


noted. 


 


 Bilateral rotator cuff arthropathy is seen, right more than left. 


 


 


 Impression: 


 


 No definite interval change is identified. 


 


 Cardiomegaly. 


 


 Minimal to mild left lower lobe linear parenchymal scarring. 


 


Reported By: Ronnie Gomez MD 








<Kiran Bolden - Last Filed: 02/06/18 15:49>





Medical Decision Making





- Medical Decision Making





02/06/18 12:43


A portion of this note was documented by scribe services under my direction. I 

have reviewed the details of the note, within reason, and agree with the 

documentation with the following case summary and management plan written by me.





87-year-old female with history of diverticulosis and recurring lower GI bleed 

presents with 3 episodes over the last 30 hours of bright red blood per rectum, 

inclusive of large clots this morning. No abdominal pain, no lightheadedness or 

syncope, no vomiting. No fevers or chills.





Vital signs stable


well appearing


abd soft/nd/nt. stool with specs of blood. 





87-year-old female with likely recurrent GI bleed secondary to diverticulosis, 

hemodynamically stable and without peritoneal findings on examination.


trend CBC


IV fluids


admission





02/06/18 14:28


hgb 7.7, near baseline. PRBC ordered. 


Cr 2.3, slightly above last baseline. 


glucose notably low but pt alert and feels well, will give PO. 


Proceed with admission, Dr. Covington covering Dr. Mckee. 


02/06/18 15:38


Accepted for inpatient med/surg by Dr. Danielle, requests consult with Dr. 

Kozicky, which was ordered. 





<Garth Weaver - Last Filed: 02/06/18 15:37>





- Medical Decision Making








02/06/18 14:41


Phone call placed to Dr. Covington at 2:40 pm.  advised Dr. Danielle will 

return the call. Pending call back. 


Second phone call placed for Dr. Danielle at 3:10 pm. Case discussed with Dr. Danielle. 

















<Kiran Bolden - Last Filed: 02/06/18 15:49>





*DC/Admit/Observation/Transfer





- Discharge Dispostion


Admit: Yes





<Garth Weaver - Last Filed: 02/06/18 15:37>





- Attestations


Scribe Attestion: 





02/06/18 13:01





Documentation prepared by Kiran Bolden, acting as medical scribe for Garth Weaver MD.





<Kiran Bolden - Last Filed: 02/06/18 15:49>


Diagnosis at time of Disposition: 


 Lower GI bleeding





Diverticulosis


Qualifiers:


 Diverticulosis site: diverticulosis of large intestine Diverticulosis bleeding

: diverticulosis with bleeding Qualified Code(s): K57.31 - Diverticulosis of 

large intestine without perforation or abscess with bleeding








- Discharge Dispostion


Condition at time of disposition: Fair

## 2018-02-07 LAB
ANION GAP SERPL CALC-SCNC: 8 MMOL/L (ref 8–16)
BUN SERPL-MCNC: 30 MG/DL (ref 7–18)
CALCIUM SERPL-MCNC: 8.6 MG/DL (ref 8.5–10.1)
CHLORIDE SERPL-SCNC: 113 MMOL/L (ref 98–107)
CO2 SERPL-SCNC: 25 MMOL/L (ref 21–32)
CREAT SERPL-MCNC: 1.9 MG/DL (ref 0.55–1.02)
DEPRECATED RDW RBC AUTO: 17.7 % (ref 11.6–15.6)
GLUCOSE SERPL-MCNC: 76 MG/DL (ref 74–106)
HCT VFR BLD CALC: 31.7 % (ref 32.4–45.2)
HGB BLD-MCNC: 10 GM/DL (ref 10.7–15.3)
MCH RBC QN AUTO: 27.1 PG (ref 25.7–33.7)
MCHC RBC AUTO-ENTMCNC: 31.4 G/DL (ref 32–36)
MCV RBC: 86.1 FL (ref 80–96)
PLATELET # BLD AUTO: 180 K/MM3 (ref 134–434)
PMV BLD: 8.5 FL (ref 7.5–11.1)
POTASSIUM SERPLBLD-SCNC: 4.4 MMOL/L (ref 3.5–5.1)
RBC # BLD AUTO: 3.69 M/MM3 (ref 3.6–5.2)
SODIUM SERPL-SCNC: 146 MMOL/L (ref 136–145)
WBC # BLD AUTO: 7.7 K/MM3 (ref 4–10)

## 2018-02-07 RX ADMIN — ATORVASTATIN CALCIUM SCH MG: 20 TABLET, FILM COATED ORAL at 21:48

## 2018-02-07 RX ADMIN — POLYETHYLENE GLYCOL 3350 SCH: 17 POWDER, FOR SOLUTION ORAL at 21:49

## 2018-02-07 RX ADMIN — INSULIN ASPART SCH: 100 INJECTION, SOLUTION INTRAVENOUS; SUBCUTANEOUS at 11:46

## 2018-02-07 RX ADMIN — INSULIN ASPART SCH: 100 INJECTION, SOLUTION INTRAVENOUS; SUBCUTANEOUS at 08:16

## 2018-02-07 RX ADMIN — PANTOPRAZOLE SODIUM SCH MG: 40 INJECTION, POWDER, FOR SOLUTION INTRAVENOUS at 09:47

## 2018-02-07 RX ADMIN — GABAPENTIN SCH MG: 100 CAPSULE ORAL at 16:00

## 2018-02-07 RX ADMIN — POLYETHYLENE GLYCOL 3350 SCH GM: 17 POWDER, FOR SOLUTION ORAL at 09:47

## 2018-02-07 RX ADMIN — BUDESONIDE AND FORMOTEROL FUMARATE DIHYDRATE SCH: 160; 4.5 AEROSOL RESPIRATORY (INHALATION) at 21:51

## 2018-02-07 RX ADMIN — GLIPIZIDE SCH MG: 5 TABLET ORAL at 16:00

## 2018-02-07 RX ADMIN — INSULIN ASPART SCH: 100 INJECTION, SOLUTION INTRAVENOUS; SUBCUTANEOUS at 16:53

## 2018-02-07 NOTE — PN
<Armando Davis - Last Filed: 02/07/18 15:25>


Physical Exam: 


SUBJECTIVE: Patient seen and examined at bedside. No overnight. No new 

complaints. No bloody BM's. Tolerating advanced diet. S/P 1 Unit PRBC's . 

Denies CP,HA, palpitations, abdominal pain, N/V.








OBJECTIVE:





 Vital Signs











 Period  Temp  Pulse  Resp  BP Sys/Boothe  Pulse Ox


 


 Last 24 Hr  98 F-98.3 F  53-63  18-22  112-158/43-78  98-98











GENERAL: AAOx3,NAD


EYES: sclera anicteric, conjunctiva clear. 


ENT: moist mucous membranes.


NECK: Trachea midline, full range of motion, supple. 


LUNGS: CTAB, no wheezing or rales 


HEART: RRR, NL S1S2, no /m/g/r


ABDOMEN: Soft,NT/ND, NL BS, no masses or organomegally. 


EXTREMITIES:no edema. 














 Laboratory Results - last 24 hr











  02/06/18 02/06/18 02/06/18





  12:31 12:31 12:31


 


WBC  6.3  


 


RBC  2.82 L  


 


Hgb  7.7 L  


 


Hct  24.5 L  


 


MCV  86.9  


 


MCH  27.2  


 


MCHC  31.3 L  


 


RDW  18.0 H  


 


Plt Count  159  


 


MPV  7.9  D  


 


Neutrophils %  72.8  


 


Lymphocytes %  17.1  


 


Monocytes %  8.0  


 


Eosinophils %  1.3  


 


Basophils %  0.8  


 


Retic Count   


 


PT with INR   12.20 H 


 


INR   1.08 


 


PTT (Actin FS)   25.4 L 


 


Sodium    146 H


 


Potassium    4.3


 


Chloride    109 H


 


Carbon Dioxide    30


 


Anion Gap    7 L


 


BUN    38 H


 


Creatinine    2.3 H


 


Creat Clearance w eGFR    20.06


 


POC Glucometer   


 


Random Glucose    46 L*


 


Calcium    8.6


 


Ferritin   


 


Total Bilirubin    0.3  D


 


AST    20


 


ALT    20


 


Alkaline Phosphatase    88


 


Total Protein    6.9


 


Albumin    3.4


 


Stool Occult Blood   


 


Blood Type   


 


Antibody Screen   


 


Crossmatch   














  02/06/18 02/06/18 02/06/18





  12:38 13:28 14:49


 


WBC   


 


RBC   


 


Hgb   


 


Hct   


 


MCV   


 


MCH   


 


MCHC   


 


RDW   


 


Plt Count   


 


MPV   


 


Neutrophils %   


 


Lymphocytes %   


 


Monocytes %   


 


Eosinophils %   


 


Basophils %   


 


Retic Count   


 


PT with INR   


 


INR   


 


PTT (Actin FS)   


 


Sodium   


 


Potassium   


 


Chloride   


 


Carbon Dioxide   


 


Anion Gap   


 


BUN   


 


Creatinine   


 


Creat Clearance w eGFR   


 


POC Glucometer    [id number]


 


Random Glucose   


 


Calcium   


 


Ferritin   


 


Total Bilirubin   


 


AST   


 


ALT   


 


Alkaline Phosphatase   


 


Total Protein   


 


Albumin   


 


Stool Occult Blood  Negative  


 


Blood Type   O POSITIVE 


 


Antibody Screen   Negative 


 


Crossmatch   See Detail 














  02/06/18 02/06/18 02/07/18





  18:34 22:02 04:31


 


WBC   


 


RBC   


 


Hgb   


 


Hct   


 


MCV   


 


MCH   


 


MCHC   


 


RDW   


 


Plt Count   


 


MPV   


 


Neutrophils %   


 


Lymphocytes %   


 


Monocytes %   


 


Eosinophils %   


 


Basophils %   


 


Retic Count   


 


PT with INR   


 


INR   


 


PTT (Actin FS)   


 


Sodium   


 


Potassium   


 


Chloride   


 


Carbon Dioxide   


 


Anion Gap   


 


BUN   


 


Creatinine   


 


Creat Clearance w eGFR   


 


POC Glucometer  90  68  71


 


Random Glucose   


 


Calcium   


 


Ferritin   


 


Total Bilirubin   


 


AST   


 


ALT   


 


Alkaline Phosphatase   


 


Total Protein   


 


Albumin   


 


Stool Occult Blood   


 


Blood Type   


 


Antibody Screen   


 


Crossmatch   














  02/07/18 02/07/18 02/07/18





  06:45 06:45 06:45


 


WBC   


 


RBC   


 


Hgb   


 


Hct   


 


MCV   


 


MCH   


 


MCHC   


 


RDW   


 


Plt Count   


 


MPV   


 


Neutrophils %   


 


Lymphocytes %   


 


Monocytes %   


 


Eosinophils %   


 


Basophils %   


 


Retic Count   1.14  D 


 


PT with INR   


 


INR   


 


PTT (Actin FS)   


 


Sodium  146 H  


 


Potassium  4.4  


 


Chloride  113 H  


 


Carbon Dioxide  25  


 


Anion Gap  8  


 


BUN  30 H  


 


Creatinine  1.9 H  


 


Creat Clearance w eGFR   


 


POC Glucometer   


 


Random Glucose  76  


 


Calcium  8.6  


 


Ferritin    128.130


 


Total Bilirubin   


 


AST   


 


ALT   


 


Alkaline Phosphatase   


 


Total Protein   


 


Albumin   


 


Stool Occult Blood   


 


Blood Type   


 


Antibody Screen   


 


Crossmatch   














  02/07/18 02/07/18





  10:35 11:24


 


WBC  7.7 


 


RBC  3.69  D 


 


Hgb  10.0 L D 


 


Hct  31.7 L D 


 


MCV  86.1 


 


MCH  27.1 


 


MCHC  31.4 L 


 


RDW  17.7 H 


 


Plt Count  180 


 


MPV  8.5 


 


Neutrophils %  


 


Lymphocytes %  


 


Monocytes %  


 


Eosinophils %  


 


Basophils %  


 


Retic Count  


 


PT with INR  


 


INR  


 


PTT (Actin FS)  


 


Sodium  


 


Potassium  


 


Chloride  


 


Carbon Dioxide  


 


Anion Gap  


 


BUN  


 


Creatinine  


 


Creat Clearance w eGFR  


 


POC Glucometer   108


 


Random Glucose  


 


Calcium  


 


Ferritin  


 


Total Bilirubin  


 


AST  


 


ALT  


 


Alkaline Phosphatase  


 


Total Protein  


 


Albumin  


 


Stool Occult Blood  


 


Blood Type  


 


Antibody Screen  


 


Crossmatch  








Active Medications











Generic Name Dose Route Start Last Admin





  Trade Name Freq  PRN Reason Stop Dose Admin


 


Dextrose/Sodium Chloride  1,000 mls @ 83 mls/hr  02/06/18 17:45  02/07/18 00:00





  D5-Ns -  IV   83 mls/hr





  ASDIR ELLA   Administration


 


Insulin Aspart  1 vial  02/07/18 07:00  02/07/18 11:46





  Novolog Vial Sliding Scale -  SQ   Not Given





  TIDAC [facility]   





  Protocol   


 


Pantoprazole Sodium  40 mg  02/06/18 17:45  02/07/18 09:47





  Protonix Iv  IVPUSH   40 mg





  DAILY ELLA   Administration


 


Polyethylene Glycol  17 gm  02/06/18 22:00  02/07/18 09:47





  Miralax (For Daily Use) -  PO   17 gm





  BID ELLA   Administration











ASSESSMENT:


86 yo F with PMhx of GI bleed presents with PRBPR.





* Given presentation and history most likely hemrhoidal bleed. 


* She had bloody BM's at home but none since admission.


* She recieved 1 unit of PRBC with appropriate response. H/H had remained 

stable. 





PLAN:


* Offered colonoscopy with possible banding but refused. 


* IF H/H remains stable and no bleeding ok for discharge from GI standpoint. 





Visit type





- Emergency Visit


Emergency Visit: Yes


ED Registration Date: 02/06/18


Care time: The patient presented to the Emergency Department on the above date 

and was hospitalized for further evaluation of their emergent condition.





- New Patient


This patient is new to me today: Yes


Date on this admission: 02/07/18





- Critical Care


Critical Care patient: No





<Angeli Ramires - Last Filed: 02/07/18 18:23>


Physical Exam: 


SUBJECTIVE: Patient seen and examined








OBJECTIVE:





 Vital Signs











 Period  Temp  Pulse  Resp  BP Sys/Boothe  Pulse Ox


 


 Last 24 Hr  98 F-98.6 F  46-59  16-20  126-178/43-58  98-98











GENERAL: The patient is awake, alert, and fully oriented, in no acute distress.


HEAD: Normal with no signs of trauma.


EYES: PERRL, extraocular movements intact, sclera anicteric, conjunctiva clear. 

No ptosis. 


ENT: Ears normal, nares patent, oropharynx clear without exudates, moist mucous 


membranes.


NECK: Trachea midline, full range of motion, supple. 


LUNGS: Breath sounds equal, clear to auscultation bilaterally, no wheezes, no 

crackles, no 


accessory muscle use. 


HEART: Regular rate and rhythm, S1, S2 without murmur, rub or gallop.


ABDOMEN: Soft, nontender, nondistended, normoactive bowel sounds, no guarding, 

no 


rebound, no hepatosplenomegaly, no masses.


EXTREMITIES: 2+ pulses, warm, well-perfused, no edema. 


NEUROLOGICAL: Cranial nerves II through XII grossly intact. Normal speech, gait 

not 


observed.


PSYCH: Normal mood, normal affect.


SKIN: Warm, dry, normal turgor, no rashes or lesions noted














 Laboratory Results - last 24 hr











  02/06/18 02/06/18 02/07/18





  18:34 22:02 04:31


 


WBC   


 


RBC   


 


Hgb   


 


Hct   


 


MCV   


 


MCH   


 


MCHC   


 


RDW   


 


Plt Count   


 


MPV   


 


Retic Count   


 


Sodium   


 


Potassium   


 


Chloride   


 


Carbon Dioxide   


 


Anion Gap   


 


BUN   


 


Creatinine   


 


POC Glucometer  90  68  71


 


Random Glucose   


 


Calcium   


 


Ferritin   














  02/07/18 02/07/18 02/07/18





  06:45 06:45 06:45


 


WBC   


 


RBC   


 


Hgb   


 


Hct   


 


MCV   


 


MCH   


 


MCHC   


 


RDW   


 


Plt Count   


 


MPV   


 


Retic Count   1.14  D 


 


Sodium  146 H  


 


Potassium  4.4  


 


Chloride  113 H  


 


Carbon Dioxide  25  


 


Anion Gap  8  


 


BUN  30 H  


 


Creatinine  1.9 H  


 


POC Glucometer   


 


Random Glucose  76  


 


Calcium  8.6  


 


Ferritin    128.130














  02/07/18 02/07/18 02/07/18





  10:35 11:24 16:45


 


WBC  7.7  


 


RBC  3.69  D  


 


Hgb  10.0 L D  


 


Hct  31.7 L D  


 


MCV  86.1  


 


MCH  27.1  


 


MCHC  31.4 L  


 


RDW  17.7 H  


 


Plt Count  180  


 


MPV  8.5  


 


Retic Count   


 


Sodium   


 


Potassium   


 


Chloride   


 


Carbon Dioxide   


 


Anion Gap   


 


BUN   


 


Creatinine   


 


POC Glucometer   108  140


 


Random Glucose   


 


Calcium   


 


Ferritin   








Active Medications











Generic Name Dose Route Start Last Admin





  Trade Name Freq  PRN Reason Stop Dose Admin


 


Allopurinol  100 mg  02/08/18 10:00  





  Zyloprim -  PO   





  DAILY [facility]   


 


Amlodipine Besylate  2.5 mg  02/08/18 10:00  





  Norvasc -  PO   





  DAILY [facility]   


 


Atorvastatin Calcium  20 mg  02/07/18 22:00  





  Lipitor -  PO   





  HS [facility]   


 


Budesonide/Formoterol Fumarate  2 puff  02/07/18 22:00  





  Symbicort 160/4.5mcg -  IH   





  BID ELLA   


 


Gabapentin  200 mg  02/07/18 15:15  02/07/18 16:00





  Neurontin -  PO   200 mg





  TID ELLA   Administration


 


Glipizide  5 mg  02/07/18 16:30  02/07/18 16:00





  Glucotrol -  PO   5 mg





  BID@0700,1630 [facility]   Administration


 


Insulin Aspart  1 vial  02/07/18 07:00  02/07/18 16:53





  Novolog Vial Sliding Scale -  SQ   Not Given





  TIDAC [facility]   





  Protocol   


 


Pantoprazole Sodium  40 mg  02/06/18 17:45  02/07/18 09:47





  Protonix Iv  IVPUSH   40 mg





  DAILY [facility]   Administration


 


Polyethylene Glycol  17 gm  02/06/18 22:00  02/07/18 09:47





  Miralax (For Daily Use) -  PO   17 gm





  BID ELLA   Administration


 


Valsartan  320 mg  02/08/18 10:00  





  Diovan -  PO   





  DAILY ELLA   











ASSESSMENT/PLAN: Case discussed with Dr Davis








Problem List





- Problems


(1) Lower GI bleeding


Code(s): K92.2 - GASTROINTESTINAL HEMORRHAGE, UNSPECIFIED   





(2) Diverticulosis


Code(s): K57.90 - DVRTCLOS OF INTEST, PART UNSP, W/O PERF OR ABSCESS W/O BLEED 

  


Qualifiers: 


   Diverticulosis site: diverticulosis of large intestine   Diverticulosis 

bleeding: diverticulosis with bleeding   Qualified Code(s): K57.31 - 

Diverticulosis of large intestine without perforation or abscess with bleeding 

  





(3) Anemia


Code(s): D64.9 - ANEMIA, UNSPECIFIED   





(4) Colon polyp


Code(s): K63.5 - POLYP OF COLON   





(5) Constipation


Code(s): K59.00 - CONSTIPATION, UNSPECIFIED   


Qualifiers: 


   Constipation type: slow transit constipation   Qualified Code(s): K59.01 - 

Slow transit constipation

## 2018-02-07 NOTE — HP
Admitting History and Physical





- Primary Care Physician


PCP: Jace Mckee





- Admission


Chief Complaint: GI bleeding


History of Present Illness: 


ER HISTORY 





- History of Present Illness


Initial Comments: 





18 12:55


The patient is a 87 year old female, with a significant past medical history of 

diabetes mellitus, CKD, COPD, anemia, hyperkalemia, hypertension, gout, 

diverticulosis, who presents to the emergency department with, approx. 3 

episodes of bloody stool for the past 2 days. The patient reports that early 

Monday morning around 3am she noted bright red blood in her stool after having 

a bowel movement. The patient reports that she used the bathroom again at 9am 

the following morning and noted continued bright red blood in her stool. The 

patient reports that earlier this morning at around 7am she had another bowel 

movement where she noted bright red blood clots in her stool. The patient 

reports her last colonoscopy was in 2016. The patient reports she 

is not currently on blood thinners. She denies abdominal pain and denies pain 

while having a bowel movement. She denies chest pain, palpations or 

lightheadedness. She denies syncope or diaphoresis. 





She denies recent fevers, chills, headache or dizziness. She denies recent 

nausea, vomit, or constipation. She denies recent  dysuria, frequency, urgency 

or hematuria. She denies recent shortness of breath.








Pt examined by me on the floors


Pt feels well


Tolerating diet 


After coming to ER, no further GI bleeding


Has h/o rectal bleeding in the past 


no abdominal pain 


no dizziness, palpitations, SOB 








- Past Medical History


CNS: Yes: Other (chronic headache with negative temporal artery biopsy)


Cardiovascular: Yes: CHF, HTN, Hyperlipdemia


Pulmonary: Yes: COPD


Gastrointestinal: Yes: Constipation, Diverticulosis, Gastritis, GI Bleed, 

Hemorrhoids, Other (colon polyp , ventral incisional hernias, esophageal 

candidiasis )


Renal/: Yes: Renal Inusuff (diabetic nephropathy)


Endocrine: Yes: Diabetes Mellitus, Other (diabetic retinopathy and nephropathy)





- Past Surgical History


Past Surgical History: Yes: Appendectomy, Breast Biopsy, Cataract Removal, 

Colonoscopy (diverticular bleed 2016, right colon polyp removed remotely ), 

Hernia Repair, Hysterectomy, Oopherectomy, Tonsillectomy, Upper Endoscopy (2016 

with esophageal candidiasis, gastritis and duodenitis)





- Smoking History


Smoking history: Never smoked


Have you smoked in the past 12 months: No


Aproximately how many cigarettes per day: 0





- Alcohol/Substance Use


Hx Alcohol Use: No


History of Substance Use: reports: None





- Social History


ADL: Independent


Occupation: retired 


History of Recent Travel: No





Home Medications





- Allergies


Allergies/Adverse Reactions: 


 Allergies











Allergy/AdvReac Type Severity Reaction Status Date / Time


 


diphenhydramine HCl Allergy Severe anaphyl Verified 18 11:14





[From Benadryl]     


 


aspirin Allergy Unknown Swelling Verified 18 11:14


 


natty flavor AdvReac Severe Swelling Verified 18 11:14














- Home Medications


Home Medications: 


Ambulatory Orders





Allopurinol [Zyloprim -] 100 mg PO DAILY 14 


Budesonide/Formeterol Fumarate [SYMBICORT 160/4.5mcg -] 2 inh PO BID 14 


Gabapentin 200 mg PO TID 14 


Simvastatin [Zocor -] 40 mg PO HS 14 


Glipizide 5 mg PO BID 17 


Sennosides [Senna -] 2 tab PO HS PRN #60 tablet 17 


Furosemide [Lasix -] 80 mg PO DAILY 17 


Amlodipine Besylate [Norvasc -] 2.5 mg PO DAILY 18 


Valsartan 320 mg PO DAILY 18 











Family Disease History





- Family Disease History


Family Disease History: Heart Disease: Father ( MI in his 50's), CA: Mother 

(rectal cancer)





Review of Systems





- Review of Systems


Constitutional: denies: Chills, Fever, Lethargy, Loss of Appetite, Weakness


Cardiovascular: denies: Chest Pain, Palpitations, Shortness of Breath


Gastrointestinal: reports: Melena.  denies: Abdominal Pain, Constipation, 

Diarrhea, Dysphagia, Nausea, Vomiting, Vomiting Blood





Physical Examination


Vital Signs: 


 Vital Signs











Temperature  98.6 F   18 17:53


 


Pulse Rate  46 L  18 17:53


 


Respiratory Rate  20   18 17:53


 


Blood Pressure  178/58   18 17:53


 


O2 Sat by Pulse Oximetry (%)  98   18 09:00











Constitutional: Yes: No Distress


Cardiovascular: Yes: Regular Rate and Rhythm


Respiratory: Yes: CTA Bilaterally


Gastrointestinal: Yes: Normal Bowel Sounds, Soft, Abdomen, Obese.  No: 

Distention, Tenderness


Edema: Yes


Edema: LLE: 1+, RLE: 1+


Neurological: Yes: Alert, Oriented


Psychiatric: Yes: Alert


Labs: 


 CBC, BMP





 18 10:35 





 18 06:45 











Imaging





- Results


Chest X-ray: Image Reviewed (no infiltrate)


EKG: Image Reviewed (sinus bradycardia)





Problem List





- Problems


(1) Diverticulosis


Code(s): K57.90 - DVRTCLOS OF INTEST, PART UNSP, W/O PERF OR ABSCESS W/O BLEED 

  


Qualifiers: 


   Diverticulosis site: diverticulosis of large intestine   Diverticulosis 

bleeding: diverticulosis with bleeding   Qualified Code(s): K57.31 - 

Diverticulosis of large intestine without perforation or abscess with bleeding 

  





(2) Lower GI bleeding


Code(s): K92.2 - GASTROINTESTINAL HEMORRHAGE, UNSPECIFIED   





(3) Acute on chronic renal failure


Code(s): N17.9 - ACUTE KIDNEY FAILURE, UNSPECIFIED; N18.9 - CHRONIC KIDNEY 

DISEASE, UNSPECIFIED   





(4) Anemia


Code(s): D64.9 - ANEMIA, UNSPECIFIED   





(5) Diabetes mellitus


Code(s): E11.9 - TYPE 2 DIABETES MELLITUS WITHOUT COMPLICATIONS   


Qualifiers: 


   Diabetes mellitus type: type 2 





Assessment/Plan





PLAN


Pt was placed on iv fluids overnight-- dc today


s/p CBC


hb better


GI eval  noted


tolerating diet 


restart meds


Renal function better


dc tomorrow if cbc stable and no further bleeding

## 2018-02-08 LAB
DEPRECATED RDW RBC AUTO: 17.5 % (ref 11.6–15.6)
HCT VFR BLD CALC: 27.7 % (ref 32.4–45.2)
HGB BLD-MCNC: 8.7 GM/DL (ref 10.7–15.3)
MCH RBC QN AUTO: 27 PG (ref 25.7–33.7)
MCHC RBC AUTO-ENTMCNC: 31.4 G/DL (ref 32–36)
MCV RBC: 86.1 FL (ref 80–96)
PLATELET # BLD AUTO: 146 K/MM3 (ref 134–434)
PMV BLD: 8.7 FL (ref 7.5–11.1)
RBC # BLD AUTO: 3.22 M/MM3 (ref 3.6–5.2)
SERUM IRON SATURATION: 39 % (ref 15–55)
TIBC SERPL-MCNC: 211 UG/DL (ref 250–450)
UIBC SERPL-MCNC: 128 UG/DL (ref 118–369)
WBC # BLD AUTO: 6.4 K/MM3 (ref 4–10)

## 2018-02-08 RX ADMIN — GABAPENTIN SCH MG: 100 CAPSULE ORAL at 14:41

## 2018-02-08 RX ADMIN — VALSARTAN SCH MG: 160 TABLET, FILM COATED ORAL at 09:54

## 2018-02-08 RX ADMIN — INSULIN ASPART SCH: 100 INJECTION, SOLUTION INTRAVENOUS; SUBCUTANEOUS at 14:37

## 2018-02-08 RX ADMIN — ALLOPURINOL SCH MG: 100 TABLET ORAL at 09:54

## 2018-02-08 RX ADMIN — GLIPIZIDE SCH MG: 5 TABLET ORAL at 17:08

## 2018-02-08 RX ADMIN — INSULIN ASPART SCH: 100 INJECTION, SOLUTION INTRAVENOUS; SUBCUTANEOUS at 17:05

## 2018-02-08 RX ADMIN — BUDESONIDE AND FORMOTEROL FUMARATE DIHYDRATE SCH PUFF: 160; 4.5 AEROSOL RESPIRATORY (INHALATION) at 09:54

## 2018-02-08 RX ADMIN — AMLODIPINE BESYLATE SCH MG: 2.5 TABLET ORAL at 09:54

## 2018-02-08 RX ADMIN — POLYETHYLENE GLYCOL 3350 SCH: 17 POWDER, FOR SOLUTION ORAL at 23:47

## 2018-02-08 RX ADMIN — BUDESONIDE AND FORMOTEROL FUMARATE DIHYDRATE SCH PUFF: 160; 4.5 AEROSOL RESPIRATORY (INHALATION) at 06:27

## 2018-02-08 RX ADMIN — BUDESONIDE AND FORMOTEROL FUMARATE DIHYDRATE SCH PUFF: 160; 4.5 AEROSOL RESPIRATORY (INHALATION) at 00:22

## 2018-02-08 RX ADMIN — GABAPENTIN SCH MG: 100 CAPSULE ORAL at 23:43

## 2018-02-08 RX ADMIN — PANTOPRAZOLE SODIUM SCH MG: 40 INJECTION, POWDER, FOR SOLUTION INTRAVENOUS at 09:54

## 2018-02-08 RX ADMIN — INSULIN ASPART SCH: 100 INJECTION, SOLUTION INTRAVENOUS; SUBCUTANEOUS at 06:29

## 2018-02-08 RX ADMIN — BUDESONIDE AND FORMOTEROL FUMARATE DIHYDRATE SCH PUFF: 160; 4.5 AEROSOL RESPIRATORY (INHALATION) at 21:10

## 2018-02-08 RX ADMIN — GABAPENTIN SCH MG: 100 CAPSULE ORAL at 06:21

## 2018-02-08 RX ADMIN — POLYETHYLENE GLYCOL 3350 SCH GM: 17 POWDER, FOR SOLUTION ORAL at 23:44

## 2018-02-08 RX ADMIN — ATORVASTATIN CALCIUM SCH MG: 20 TABLET, FILM COATED ORAL at 23:43

## 2018-02-08 RX ADMIN — GLIPIZIDE SCH MG: 5 TABLET ORAL at 06:21

## 2018-02-08 RX ADMIN — POLYETHYLENE GLYCOL 3350 SCH GM: 17 POWDER, FOR SOLUTION ORAL at 10:02

## 2018-02-08 NOTE — PN
GI Progress Note


Subjective: 





No acute events


States had bloody BM 3 AM this morning


No abdominal pain








- Objective


Vital Signs: 


 Vital Signs











Temperature  98.5 F   02/08/18 10:00


 


Pulse Rate  45 L  02/08/18 10:00


 


Respiratory Rate  20   02/08/18 10:00


 


Blood Pressure  149/72   02/08/18 10:00


 


O2 Sat by Pulse Oximetry (%)  97   02/08/18 09:00











Constitutional: Calm


Eyes: No: Sclera Icterus


Cardiovascular: Yes: Regular Rate and Rhythm


Respiratory: Yes: CTA Bilaterally


Gastrointestinal Inspection: Yes: Hernia (+ left lower abdominal incisional 

hernia), Scars (Multiple abdominal surgical scars)


...Auscultate: Yes: Normoactive Bowel Sounds


...Palpate: No: Hepatomegaly, Splenomegaly, Tenderness


...Percussion: No: Tympanitic


Edema: Yes


Edema: LLE: 1+, RLE: 1+


Neurological: Yes: Alert, Oriented


Labs: 


 CBC, BMP





 02/08/18 06:00 





 02/07/18 06:45 





 INR, PTT











INR  1.08  (0.82-1.09)   02/06/18  12:31    








 Hepatic Panel











Total Bilirubin  0.3 mg/dL (0.2-1.0)  D 02/06/18  12:31    


 


AST  20 U/L (15-37)   02/06/18  12:31    


 


ALT  20 U/L (12-78)   02/06/18  12:31    


 


Alkaline Phosphatase  88 U/L ()   02/06/18  12:31    


 


Albumin  3.4 g/dl (3.4-5.0)   02/06/18  12:31    














Problem List





- Problems


(1) Lower GI bleeding


Assessment/Plan: 


Painless hematochezia. recurrent last night:


? hemorrhoidal vs. recurrent diverticular 


H/H at baseline currently without further recurrence of bleeding today


Discussed repeat colonoscopy for further evaluation today.  She was concerned 

about possible banding of hemorrhoids and having a procedure at her age. I 

explained that hemorrhoidal banding was brought up as an option if it appeared 

as though they were a clear source of the bleeding and would not necessarily 

need to be performed.  I explained that colonoscopy would help exclude 

alternate sources of bleeding as well.  She stated that she was not ready to 

commit to procedure at this time and would like to think about it / talk with 

her son.  Change to clear liquids for now and monitor for continued bleeding





If profuse bleeding occurs, transfer to ICU setting





Dr. Danielle was present during my conversation with Ms. Mcdonald  














Code(s): K92.2 - GASTROINTESTINAL HEMORRHAGE, UNSPECIFIED

## 2018-02-08 NOTE — PN
Progress Note, Physician


Chief Complaint: 





had another bloody bm today morning


no pain , no dizziness








- Current Medication List


Current Medications: 


Active Medications





Allopurinol (Zyloprim -)  100 mg PO DAILY [facility]


   Last Admin: 02/08/18 09:54 Dose:  100 mg


Amlodipine Besylate (Norvasc -)  2.5 mg PO DAILY [facility]


   Last Admin: 02/08/18 09:54 Dose:  2.5 mg


Atorvastatin Calcium (Lipitor -)  20 mg PO HS [facility]


   Last Admin: 02/07/18 21:48 Dose:  20 mg


Budesonide/Formoterol Fumarate (Symbicort 160/4.5mcg -)  2 puff IH BID [facility]


   Last Admin: 02/08/18 09:54 Dose:  2 puff


Gabapentin (Neurontin -)  200 mg PO TID [facility]


   Last Admin: 02/08/18 06:21 Dose:  200 mg


Glipizide (Glucotrol -)  5 mg PO BID@0700,1630 [facility]


   Last Admin: 02/08/18 06:21 Dose:  5 mg


Insulin Aspart (Novolog Vial Sliding Scale -)  1 vial SQ TIDAC [facility]


   PRN Reason: Protocol


   Last Admin: 02/08/18 06:29 Dose:  Not Given


Pantoprazole Sodium (Protonix Iv)  40 mg IVPUSH DAILY [facility]


   Last Admin: 02/08/18 09:54 Dose:  40 mg


Polyethylene Glycol (Miralax (For Daily Use) -)  17 gm PO BID [facility]


   Last Admin: 02/08/18 10:02 Dose:  17 gm


Valsartan (Diovan -)  320 mg PO DAILY [facility]


   Last Admin: 02/08/18 09:54 Dose:  320 mg











- Objective


Vital Signs: 


 Vital Signs











Temperature  98.5 F   02/08/18 10:00


 


Pulse Rate  45 L  02/08/18 10:00


 


Respiratory Rate  20   02/08/18 10:00


 


Blood Pressure  149/72   02/08/18 10:00


 


O2 Sat by Pulse Oximetry (%)  97   02/08/18 09:00











Constitutional: Yes: No Distress


Cardiovascular: Yes: Regular Rate and Rhythm


Respiratory: Yes: CTA Bilaterally


Gastrointestinal: Yes: Normal Bowel Sounds, Soft, Abdomen, Obese.  No: 

Tenderness


Edema: Yes


Edema: LLE: 1+, RLE: 1+


Labs: 


 CBC, BMP





 02/08/18 06:00 





 02/07/18 06:45 





 INR, PTT











INR  1.08  (0.82-1.09)   02/06/18  12:31    














Problem List





- Problems


(1) Diverticulosis


Code(s): K57.90 - DVRTCLOS OF INTEST, PART UNSP, W/O PERF OR ABSCESS W/O BLEED 

  


Qualifiers: 


   Diverticulosis site: diverticulosis of large intestine   Diverticulosis 

bleeding: diverticulosis with bleeding   Qualified Code(s): K57.31 - 

Diverticulosis of large intestine without perforation or abscess with bleeding 

  





(2) Lower GI bleeding


Code(s): K92.2 - GASTROINTESTINAL HEMORRHAGE, UNSPECIFIED   





(3) Acute on chronic renal failure


Code(s): N17.9 - ACUTE KIDNEY FAILURE, UNSPECIFIED; N18.9 - CHRONIC KIDNEY 

DISEASE, UNSPECIFIED   





(4) Anemia


Code(s): D64.9 - ANEMIA, UNSPECIFIED   





(5) Diabetes mellitus


Code(s): E11.9 - TYPE 2 DIABETES MELLITUS WITHOUT COMPLICATIONS   


Qualifiers: 


   Diabetes mellitus type: type 2 





Assessment/Plan





PLAN


HB noted


will recheck this PM 





GI follow up - spoke with DR Castañeda-- pt will think about endoscopic 

procedures-- she was told bleeding may be due to diverticulosis or hemorrhoids- 

treatment options also were discussed by GI -- pt will like to talk to her son 

and she will decide


tolerating diet 





Renal function better

## 2018-02-09 LAB
ALBUMIN SERPL-MCNC: 2.9 G/DL (ref 3.4–5)
ALP SERPL-CCNC: 78 U/L (ref 45–117)
ALT SERPL-CCNC: 16 U/L (ref 12–78)
ANION GAP SERPL CALC-SCNC: 6 MMOL/L (ref 8–16)
AST SERPL-CCNC: 16 U/L (ref 15–37)
BASOPHILS # BLD: 0.9 % (ref 0–2)
BILIRUB SERPL-MCNC: 0.5 MG/DL (ref 0.2–1)
BUN SERPL-MCNC: 25 MG/DL (ref 7–18)
CALCIUM SERPL-MCNC: 8.8 MG/DL (ref 8.5–10.1)
CHLORIDE SERPL-SCNC: 112 MMOL/L (ref 98–107)
CO2 SERPL-SCNC: 26 MMOL/L (ref 21–32)
CREAT SERPL-MCNC: 1.6 MG/DL (ref 0.55–1.02)
DEPRECATED RDW RBC AUTO: 17.6 % (ref 11.6–15.6)
DEPRECATED RDW RBC AUTO: 17.7 % (ref 11.6–15.6)
EOSINOPHIL # BLD: 2.8 % (ref 0–4.5)
GLUCOSE SERPL-MCNC: 58 MG/DL (ref 74–106)
HCT VFR BLD CALC: 26.8 % (ref 32.4–45.2)
HCT VFR BLD CALC: 27.1 % (ref 32.4–45.2)
HGB BLD-MCNC: 8.4 GM/DL (ref 10.7–15.3)
HGB BLD-MCNC: 8.4 GM/DL (ref 10.7–15.3)
LYMPHOCYTES # BLD: 15.3 % (ref 8–40)
MCH RBC QN AUTO: 26.8 PG (ref 25.7–33.7)
MCH RBC QN AUTO: 27.3 PG (ref 25.7–33.7)
MCHC RBC AUTO-ENTMCNC: 31.1 G/DL (ref 32–36)
MCHC RBC AUTO-ENTMCNC: 31.5 G/DL (ref 32–36)
MCV RBC: 86.2 FL (ref 80–96)
MCV RBC: 86.8 FL (ref 80–96)
MONOCYTES # BLD AUTO: 7.7 % (ref 3.8–10.2)
NEUTROPHILS # BLD: 73.3 % (ref 42.8–82.8)
PLATELET # BLD AUTO: 155 K/MM3 (ref 134–434)
PLATELET # BLD AUTO: 160 K/MM3 (ref 134–434)
PMV BLD: 10 FL (ref 7.5–11.1)
PMV BLD: 9.4 FL (ref 7.5–11.1)
POTASSIUM SERPLBLD-SCNC: 4.4 MMOL/L (ref 3.5–5.1)
PROT SERPL-MCNC: 6.1 G/DL (ref 6.4–8.2)
RBC # BLD AUTO: 3.09 M/MM3 (ref 3.6–5.2)
RBC # BLD AUTO: 3.14 M/MM3 (ref 3.6–5.2)
SODIUM SERPL-SCNC: 144 MMOL/L (ref 136–145)
WBC # BLD AUTO: 6.2 K/MM3 (ref 4–10)
WBC # BLD AUTO: 6.9 K/MM3 (ref 4–10)

## 2018-02-09 RX ADMIN — POLYETHYLENE GLYCOL 3350 SCH GM: 17 POWDER, FOR SOLUTION ORAL at 22:03

## 2018-02-09 RX ADMIN — GABAPENTIN SCH MG: 100 CAPSULE ORAL at 03:10

## 2018-02-09 RX ADMIN — AMLODIPINE BESYLATE SCH MG: 2.5 TABLET ORAL at 09:56

## 2018-02-09 RX ADMIN — BUDESONIDE AND FORMOTEROL FUMARATE DIHYDRATE SCH PUFF: 160; 4.5 AEROSOL RESPIRATORY (INHALATION) at 22:03

## 2018-02-09 RX ADMIN — INSULIN ASPART SCH: 100 INJECTION, SOLUTION INTRAVENOUS; SUBCUTANEOUS at 16:45

## 2018-02-09 RX ADMIN — INSULIN ASPART SCH: 100 INJECTION, SOLUTION INTRAVENOUS; SUBCUTANEOUS at 12:35

## 2018-02-09 RX ADMIN — POLYETHYLENE GLYCOL 3350 SCH GM: 17 POWDER, FOR SOLUTION ORAL at 10:00

## 2018-02-09 RX ADMIN — GABAPENTIN SCH MG: 100 CAPSULE ORAL at 22:03

## 2018-02-09 RX ADMIN — GABAPENTIN SCH MG: 100 CAPSULE ORAL at 06:43

## 2018-02-09 RX ADMIN — BUDESONIDE AND FORMOTEROL FUMARATE DIHYDRATE SCH PUFF: 160; 4.5 AEROSOL RESPIRATORY (INHALATION) at 10:01

## 2018-02-09 RX ADMIN — PANTOPRAZOLE SODIUM SCH MG: 40 INJECTION, POWDER, FOR SOLUTION INTRAVENOUS at 10:00

## 2018-02-09 RX ADMIN — ALLOPURINOL SCH MG: 100 TABLET ORAL at 09:56

## 2018-02-09 RX ADMIN — ATORVASTATIN CALCIUM SCH MG: 20 TABLET, FILM COATED ORAL at 22:03

## 2018-02-09 RX ADMIN — GLIPIZIDE SCH: 5 TABLET ORAL at 06:44

## 2018-02-09 RX ADMIN — VALSARTAN SCH MG: 160 TABLET, FILM COATED ORAL at 10:02

## 2018-02-09 RX ADMIN — INSULIN ASPART SCH: 100 INJECTION, SOLUTION INTRAVENOUS; SUBCUTANEOUS at 06:43

## 2018-02-09 NOTE — PN
Progress Note, Physician


History of Present Illness: 





pt seen/ examined


chart reviewed


comfortable


no overnight bleeding


denies pain.


thinking about colonoscopy








- Current Medication List


Current Medications: 


Active Medications





Allopurinol (Zyloprim -)  100 mg PO DAILY [facility]


   Last Admin: 02/08/18 09:54 Dose:  100 mg


Amlodipine Besylate (Norvasc -)  2.5 mg PO DAILY [facility]


   Last Admin: 02/08/18 09:54 Dose:  2.5 mg


Atorvastatin Calcium (Lipitor -)  20 mg PO HS [facility]


   Last Admin: 02/08/18 23:43 Dose:  20 mg


Budesonide/Formoterol Fumarate (Symbicort 160/4.5mcg -)  2 puff IH BID [facility]


   Last Admin: 02/08/18 21:10 Dose:  2 puff


Gabapentin (Neurontin -)  200 mg PO TID [facility]


   Last Admin: 02/09/18 06:43 Dose:  200 mg


Insulin Aspart (Novolog Vial Sliding Scale -)  1 vial SQ TIDAC [facility]


   PRN Reason: Protocol


   Last Admin: 02/09/18 06:43 Dose:  Not Given


Pantoprazole Sodium (Protonix Iv)  40 mg IVPUSH DAILY [facility]


   Last Admin: 02/08/18 09:54 Dose:  40 mg


Polyethylene Glycol (Miralax (For Daily Use) -)  17 gm PO BID [facility]


   Last Admin: 02/08/18 23:47 Dose:  Not Given


Valsartan (Diovan -)  320 mg PO DAILY [facility]


   Last Admin: 02/08/18 09:54 Dose:  320 mg











- Objective


Vital Signs: 


 Vital Signs











Temperature  97.8 F   02/09/18 06:08


 


Pulse Rate  48 L  02/09/18 06:08


 


Respiratory Rate  20   02/09/18 06:08


 


Blood Pressure  163/57   02/09/18 06:08


 


O2 Sat by Pulse Oximetry (%)  98   02/08/18 21:00











Constitutional: Yes: No Distress, Calm


Eyes: Yes: Conjunctiva Clear


Neck: Yes: Supple


Cardiovascular: Yes: Bradycardia


Respiratory: Yes: CTA Bilaterally


Gastrointestinal: Yes: Normal Bowel Sounds, Soft


Edema: No


Neurological: Yes: Alert


Psychiatric: Yes: Alert


Labs: 


 CBC, BMP





 02/09/18 06:30 





 02/09/18 06:30 





 INR, PTT











INR  1.08  (0.82-1.09)   02/06/18  12:31    














Problem List





- Problems


(1) Bradycardia


Code(s): R00.1 - BRADYCARDIA, UNSPECIFIED   





(2) Hypoglycemia associated with diabetes


Code(s): E11.649 - TYPE 2 DIABETES MELLITUS WITH HYPOGLYCEMIA WITHOUT COMA   





(3) Diverticulosis


Code(s): K57.90 - DVRTCLOS OF INTEST, PART UNSP, W/O PERF OR ABSCESS W/O BLEED 

  


Qualifiers: 


   Diverticulosis site: diverticulosis of large intestine   Diverticulosis 

bleeding: diverticulosis with bleeding   Qualified Code(s): K57.31 - 

Diverticulosis of large intestine without perforation or abscess with bleeding 

  





(4) Lower GI bleeding


Code(s): K92.2 - GASTROINTESTINAL HEMORRHAGE, UNSPECIFIED   





(5) Acute on chronic renal failure


Code(s): N17.9 - ACUTE KIDNEY FAILURE, UNSPECIFIED; N18.9 - CHRONIC KIDNEY 

DISEASE, UNSPECIFIED   





Assessment/Plan





clinically stable.


monitor for bleeding


cbc- q 12


hypoglycemic


d/c glucotrol


alexx- will order holtor


will follow.

## 2018-02-09 NOTE — PN
GI Progress Note


Subjective: 





GI NOte: Notified by the nursing staff that Maria C is now agreeable to repeat 

endoscopies and hemorrhoidal banding. I have discussed both procedures with her 

in detail and informed her of the risks of perforation and hemorrhage that 

could lead to the need for surgery and blood transfusions. I also reminded her 

of the discomfort that exists in the first few days following hemorrhoidal 

banding. She has signed an informed consent. I called Dr Covington to discuss the 

bradycardia and we mutually agree on getting a cardiology clearance. Maria C 

has seen Dr Torrez in the past and would for him to consult so I will place 

the consult order. I will advance her diet in the interim .





- Objective


Vital Signs: 


 Vital Signs











Temperature  97.8 F   02/09/18 06:08


 


Pulse Rate  48 L  02/09/18 06:08


 


Respiratory Rate  20   02/09/18 06:08


 


Blood Pressure  163/57   02/09/18 06:08


 


O2 Sat by Pulse Oximetry (%)  98   02/08/18 21:00








 Laboratory Tests











  02/06/18 02/06/18 02/07/18





  12:31 12:31 06:45


 


Hgb  7.7 L  


 


Retic Count    1.14  D


 


PT with INR   12.20 H 


 


BUN   


 


Creatinine   


 


Total Bilirubin   


 


AST   


 


ALT   


 


Alkaline Phosphatase   


 


Albumin   














  02/07/18 02/09/18 02/09/18





  10:35 06:30 06:30


 


Hgb  10.0 L D  8.4 L 


 


Retic Count   


 


PT with INR   


 


BUN    25 H


 


Creatinine    1.6 H


 


Total Bilirubin    0.5  D


 


AST    16


 


ALT    16


 


Alkaline Phosphatase    78


 


Albumin    2.9 L











Constitutional: No Distress


...Auscultate: Yes: Normoactive Bowel Sounds


...Palpate: Yes: Soft, Other (nontender)


Labs: 


 CBC, BMP





 02/09/18 06:30 





 02/09/18 06:30 





 INR, PTT











INR  1.08  (0.82-1.09)   02/06/18  12:31    














Problem List





- Problems


(1) Lower GI bleeding


Assessment/Plan: 


 Continue to suspect recurring hemorrhoidal bleeding which will hopefully be 

amenable to rubber band ligation. She also needs an EGD to exclude a UGI source 

of bleeding as contributing to her anemia. Maria C has signed an informed 

consent. I will schedule her IF/ when cardiology clears her. I will advance her 

diet as the bleeding appears to be self limited. Dr. Shea will be covering 

this weekend 


Code(s): K92.2 - GASTROINTESTINAL HEMORRHAGE, UNSPECIFIED   





(2) Diverticulosis


Code(s): K57.90 - DVRTCLOS OF INTEST, PART UNSP, W/O PERF OR ABSCESS W/O BLEED 

  


Qualifiers: 


   Diverticulosis site: diverticulosis of large intestine   Diverticulosis 

bleeding: diverticulosis with bleeding   Qualified Code(s): K57.31 - 

Diverticulosis of large intestine without perforation or abscess with bleeding 

  





(3) Anemia


Code(s): D64.9 - ANEMIA, UNSPECIFIED   





(4) Colon polyp


Code(s): K63.5 - POLYP OF COLON   





(5) Constipation


Code(s): K59.00 - CONSTIPATION, UNSPECIFIED   


Qualifiers: 


   Constipation type: slow transit constipation   Qualified Code(s): K59.01 - 

Slow transit constipation

## 2018-02-10 LAB
BASOPHILS # BLD: 1 % (ref 0–2)
DEPRECATED RDW RBC AUTO: 17.7 % (ref 11.6–15.6)
EOSINOPHIL # BLD: 2.4 % (ref 0–4.5)
HCT VFR BLD CALC: 29.8 % (ref 32.4–45.2)
HGB BLD-MCNC: 9.5 GM/DL (ref 10.7–15.3)
LYMPHOCYTES # BLD: 17.4 % (ref 8–40)
MCH RBC QN AUTO: 27.4 PG (ref 25.7–33.7)
MCHC RBC AUTO-ENTMCNC: 31.7 G/DL (ref 32–36)
MCV RBC: 86.3 FL (ref 80–96)
MONOCYTES # BLD AUTO: 5.6 % (ref 3.8–10.2)
NEUTROPHILS # BLD: 73.6 % (ref 42.8–82.8)
PLATELET # BLD AUTO: 157 K/MM3 (ref 134–434)
PMV BLD: 8.5 FL (ref 7.5–11.1)
RBC # BLD AUTO: 3.46 M/MM3 (ref 3.6–5.2)
WBC # BLD AUTO: 6.3 K/MM3 (ref 4–10)

## 2018-02-10 RX ADMIN — INSULIN ASPART SCH: 100 INJECTION, SOLUTION INTRAVENOUS; SUBCUTANEOUS at 11:47

## 2018-02-10 RX ADMIN — GABAPENTIN SCH MG: 100 CAPSULE ORAL at 06:45

## 2018-02-10 RX ADMIN — ALLOPURINOL SCH MG: 100 TABLET ORAL at 09:20

## 2018-02-10 RX ADMIN — VALSARTAN SCH MG: 160 TABLET, FILM COATED ORAL at 09:20

## 2018-02-10 RX ADMIN — INSULIN ASPART SCH UNITS: 100 INJECTION, SOLUTION INTRAVENOUS; SUBCUTANEOUS at 18:00

## 2018-02-10 RX ADMIN — ATORVASTATIN CALCIUM SCH MG: 20 TABLET, FILM COATED ORAL at 21:43

## 2018-02-10 RX ADMIN — POLYETHYLENE GLYCOL 3350 SCH GM: 17 POWDER, FOR SOLUTION ORAL at 09:22

## 2018-02-10 RX ADMIN — BUDESONIDE AND FORMOTEROL FUMARATE DIHYDRATE SCH PUFF: 160; 4.5 AEROSOL RESPIRATORY (INHALATION) at 21:43

## 2018-02-10 RX ADMIN — PANTOPRAZOLE SODIUM SCH MG: 40 INJECTION, POWDER, FOR SOLUTION INTRAVENOUS at 09:19

## 2018-02-10 RX ADMIN — POLYETHYLENE GLYCOL 3350 SCH: 17 POWDER, FOR SOLUTION ORAL at 21:42

## 2018-02-10 RX ADMIN — INSULIN ASPART SCH: 100 INJECTION, SOLUTION INTRAVENOUS; SUBCUTANEOUS at 06:48

## 2018-02-10 RX ADMIN — AMLODIPINE BESYLATE SCH MG: 2.5 TABLET ORAL at 09:20

## 2018-02-10 RX ADMIN — GABAPENTIN SCH MG: 100 CAPSULE ORAL at 15:30

## 2018-02-10 RX ADMIN — BUDESONIDE AND FORMOTEROL FUMARATE DIHYDRATE SCH PUFF: 160; 4.5 AEROSOL RESPIRATORY (INHALATION) at 09:23

## 2018-02-10 RX ADMIN — GABAPENTIN SCH MG: 100 CAPSULE ORAL at 21:42

## 2018-02-10 NOTE — PN
Progress Note (short form)





- Note


Progress Note: 





awaiting cardio clearance for procedures





Problem List





- Problems


(1) Lower GI bleeding


Code(s): K92.2 - GASTROINTESTINAL HEMORRHAGE, UNSPECIFIED

## 2018-02-10 NOTE — EKG
Test Reason : 

Blood Pressure : ***/*** mmHG

Vent. Rate : 048 BPM     Atrial Rate : 048 BPM

   P-R Int : 156 ms          QRS Dur : 092 ms

    QT Int : 442 ms       P-R-T Axes : 069 -48 050 degrees

   QTc Int : 394 ms

 

SINUS BRADYCARDIA

LEFT ANTERIOR FASCICULAR BLOCK

NONSPECIFIC T WAVE ABNORMALITY

ABNORMAL ECG

WHEN COMPARED WITH ECG OF 06-FEB-2018 13:09,

NONSPECIFIC T WAVE ABNORMALITY HAS REPLACED INVERTED T WAVES IN 

LATERAL LEADS

Confirmed by BRIAN MICHELE MD ([id number]) on 2/10/2018 4:04:39 PM

 

Referred By: MING SIEGEL           Confirmed By:BRIAN MICHELE MD

## 2018-02-10 NOTE — CON.CARD
Cardiology Consult (text)





- Consultation


Consultation Note: 








CC: pre-op clearance/bradycardia/elevated troponin





88 yo with h/o HTN, venous insufficiency, DM, CKD, COPD, anemia, hyperkalemia, 

hypertension, gout, diverticulosis, who p/w BRBPR. 


 


Presented with BRBPR and hgb drop.  s/p PRBC's.  Now with plan for endoscopy 

but noted to be bradycardic.  





Additionally, this morning had episode of chest heaviness.  States she had a 

similar more severe episode 2 weeks ago that woke her up from sleep and then 

resolved on its own after a few hours.





Endorses decreased exercise capacity and worsened gonzalez over the past few months.

  Can no longer walk up a flight of stairs.  





cards: Dr. Torrez





- Past Medical History


CNS: Yes: Other (chronic headache with negative temporal artery biopsy)


Cardiovascular: Yes: CHF, HTN, Hyperlipdemia


Pulmonary: Yes: COPD


Gastrointestinal: Yes: Constipation, Diverticulosis, Gastritis, GI Bleed, 

Hemorrhoids, Other (colon polyp 2010, ventral incisional hernias, esophageal 

candidiasis 2016)


Renal/: Yes: Renal Inusuff (diabetic nephropathy)


Endocrine: Yes: Diabetes Mellitus, Other (diabetic retinopathy and nephropathy)





- Past Surgical History


Past Surgical History: Yes: Appendectomy, Breast Biopsy, Cataract Removal, 

Colonoscopy (diverticular bleed 2016, right colon polyp removed remotely ), 

Hernia Repair, Hysterectomy, Oopherectomy, Tonsillectomy, Upper Endoscopy (2016 

with esophageal candidiasis, gastritis and duodenitis)





- Smoking History


Smoking history: Never smoked





fam hx: no premature cad


ros: per hpi





Ambulatory Orders





Allopurinol [Zyloprim -] 100 mg PO DAILY 07/19/14 


Budesonide/Formeterol Fumarate [SYMBICORT 160/4.5mcg -] 2 inh PO BID 07/19/14 


Gabapentin 200 mg PO TID 07/19/14 


Simvastatin [Zocor -] 40 mg PO HS 07/19/14 


Glipizide 5 mg PO BID 09/09/17 


Sennosides [Senna -] 2 tab PO HS PRN #60 tablet 09/14/17 


Furosemide [Lasix -] 80 mg PO DAILY 11/02/17 


Amlodipine Besylate [Norvasc -] 2.5 mg PO DAILY 02/06/18 


Valsartan 320 mg PO DAILY 02/06/18 





                  Current Medications: 


 





Allopurinol (Zyloprim -)  100 mg PO DAILY [facility]


   Last Admin: 02/10/18 09:20 Dose:  100 mg


Amlodipine Besylate (Norvasc -)  2.5 mg PO DAILY [facility]


   Last Admin: 02/10/18 09:20 Dose:  2.5 mg


Atorvastatin Calcium (Lipitor -)  20 mg PO HS [facility]


   Last Admin: 02/09/18 22:03 Dose:  20 mg


Budesonide/Formoterol Fumarate (Symbicort 160/4.5mcg -)  2 puff IH BID [facility]


   Last Admin: 02/10/18 09:23 Dose:  2 puff


Gabapentin (Neurontin -)  200 mg PO TID [facility]


   Last Admin: 02/10/18 06:45 Dose:  200 mg


Insulin Aspart (Novolog Vial Sliding Scale -)  1 vial SQ TIDAC [facility]


   PRN Reason: Protocol


   Last Admin: 02/10/18 06:48 Dose:  Not Given


Pantoprazole Sodium (Protonix Iv)  40 mg IVPUSH DAILY [facility]


   Last Admin: 02/10/18 09:19 Dose:  40 mg


Polyethylene Glycol (Miralax (For Daily Use) -)  17 gm PO BID [facility]


   Last Admin: 02/10/18 09:22 Dose:  17 gm


Valsartan (Diovan -)  320 mg PO DAILY [facility]


   Last Admin: 02/10/18 09:20 Dose:  320 mg








 


 Vital Signs











Temperature  98.0 F   02/10/18 06:00


 


Pulse Rate  79   02/10/18 06:00


 


Respiratory Rate  18   02/10/18 06:00


 


Blood Pressure  127/51   02/10/18 06:00


 


O2 Sat by Pulse Oximetry (%)  96   02/09/18 21:00











nad, calm


jvd flat, neck supple


ctab, nl effort


rrr nl s1, s2 no mrg


+ bs soft nt nd, obese


no le e/c/c


+ dp/pt, no carotid bruits


aaox3


no jaundice, diaphoresis








 Laboratory Tests











  02/06/18 02/09/18 02/10/18





  12:31 06:30 09:55


 


Hgb  7.7 L   9.5 L D


 


Sodium   144 


 


Potassium   4.4 


 


Chloride   112 H 


 


Carbon Dioxide   26 


 


BUN   25 H 


 


Creatinine   1.6 H 


 


Creat Clearance w eGFR   30.49 


 


Creatine Kinase   


 


Troponin I   


 


Albumin   2.9 L 














  02/10/18





  09:55


 


Hgb 


 


Sodium 


 


Potassium 


 


Chloride 


 


Carbon Dioxide 


 


BUN 


 


Creatinine 


 


Creat Clearance w eGFR 


 


Creatine Kinase  124


 


Troponin I  0.49 H


 


Albumin 














ekg 2/6: sb 55 bpm.  lad.  lafb.  new lateral twi.  


ekg 2/10: sb 48 bpm. lad. lafb.  slight improvement in lateral twave 

abnormality (now flat/biphasic)


cxr: no acute pathology





echo 12/2017 :nl lv/rv size/fn.  impaired relaxation.  1+ lae. nl valves.  nl 

ivc.  no rvsp. 





holter 12/2016: avg HR 56 bpm.  no bradyarrhythmias.  no sig pauses.  1% pvc 

burden.  one run of psvt (4 beats).  





stress test 2014: no ekg changes, sx's. bline hr 52 bpm.  nl perfusion/EF. 





A/P





88 yo with h/o HTN, venous insufficiency, DM, CKD, COPD, anemia, hyperkalemia, 

hypertension, gout, diverticulosis, who p/w BRBPR hospital course c/b 

bradycardia, cp and troponin leak. 





pre-op clearance


- patient with episode of chest heavines this morning, subtle changes on her 

ekg from baseline and troponin leak.  


- Recommend transfer to tele.  Con't yeison.  





troponin elevation, presumed cad


- Mgm't of cad will be complicated in this patient.  She endorses ASA allergy (

angioedema).  Will need to d/w GI regarding safety of plavix in general in this 

patient with recurrent GIB and chronic anemia.  


- no recurrence of CP.  If she has a recurrence will need to uptitrate norvasc 

as anti-anginal


- con't statin





bradycardia


- has longstanding hx of bradycardia, asx.  Patient had Holter monitor for 

evaluation of bradycardia 12/2016.   Holter placed here as well --> no sig 

bradyarrhythmias.  


  


htn


- stable on valsartan and low dose norvasc





venous insufficiency


- on lasix 80 mg po daily as outpatient.  has been held here.  Will con't to 

hold in case she has recurrence of gib.  No signs of voluem overload at this 

time.

## 2018-02-10 NOTE — PN
Progress Note, Physician


Chief Complaint: 





no further bloody bm


pt c/o mild chest discomfort - left sided today AM-- not reproducible


no SOB 


Pt admits that about 2 weeks ago , she had heavy chest pain that woke her up in 

the night, went away for some time. Did not want to go to ER at that time 





- Current Medication List


Current Medications: 


Active Medications





Allopurinol (Zyloprim -)  100 mg PO DAILY [facility]


   Last Admin: 02/10/18 09:20 Dose:  100 mg


Amlodipine Besylate (Norvasc -)  2.5 mg PO DAILY [facility]


   Last Admin: 02/10/18 09:20 Dose:  2.5 mg


Atorvastatin Calcium (Lipitor -)  20 mg PO HS [facility]


   Last Admin: 02/09/18 22:03 Dose:  20 mg


Budesonide/Formoterol Fumarate (Symbicort 160/4.5mcg -)  2 puff IH BID [facility]


   Last Admin: 02/10/18 09:23 Dose:  2 puff


Gabapentin (Neurontin -)  200 mg PO TID [facility]


   Last Admin: 02/10/18 06:45 Dose:  200 mg


Insulin Aspart (Novolog Vial Sliding Scale -)  1 vial SQ TIDAC [facility]


   PRN Reason: Protocol


   Last Admin: 02/10/18 06:48 Dose:  Not Given


Pantoprazole Sodium (Protonix Iv)  40 mg IVPUSH DAILY [facility]


   Last Admin: 02/10/18 09:19 Dose:  40 mg


Polyethylene Glycol (Miralax (For Daily Use) -)  17 gm PO BID [facility]


   Last Admin: 02/10/18 09:22 Dose:  17 gm


Valsartan (Diovan -)  320 mg PO DAILY [facility]


   Last Admin: 02/10/18 09:20 Dose:  320 mg











- Objective


Vital Signs: 


 Vital Signs











Temperature  98.0 F   02/10/18 06:00


 


Pulse Rate  79   02/10/18 06:00


 


Respiratory Rate  18   02/10/18 06:00


 


Blood Pressure  127/51   02/10/18 06:00


 


O2 Sat by Pulse Oximetry (%)  96   02/09/18 21:00











Constitutional: Yes: No Distress


Cardiovascular: Yes: Regular Rate and Rhythm


Respiratory: Yes: CTA Bilaterally


Gastrointestinal: Yes: Normal Bowel Sounds, Soft, Abdomen, Obese.  No: 

Tenderness


Edema: Yes


Edema: LLE: 1+, RLE: 1+


Labs: 


 CBC, BMP





 02/09/18 21:55 





 02/09/18 06:30 





 INR, PTT











INR  1.08  (0.82-1.09)   02/06/18  12:31    














Problem List





- Problems


(1) Diverticulosis


Code(s): K57.90 - DVRTCLOS OF INTEST, PART UNSP, W/O PERF OR ABSCESS W/O BLEED 

  


Qualifiers: 


   Diverticulosis site: diverticulosis of large intestine   Diverticulosis 

bleeding: diverticulosis with bleeding   Qualified Code(s): K57.31 - 

Diverticulosis of large intestine without perforation or abscess with bleeding 

  





(2) Lower GI bleeding


Code(s): K92.2 - GASTROINTESTINAL HEMORRHAGE, UNSPECIFIED   





(3) Acute on chronic renal failure


Code(s): N17.9 - ACUTE KIDNEY FAILURE, UNSPECIFIED; N18.9 - CHRONIC KIDNEY 

DISEASE, UNSPECIFIED   





(4) Anemia


Code(s): D64.9 - ANEMIA, UNSPECIFIED   





(5) Diabetes mellitus


Code(s): E11.9 - TYPE 2 DIABETES MELLITUS WITHOUT COMPLICATIONS   


Qualifiers: 


   Diabetes mellitus type: type 2 





(6) Chest pain


Code(s): R07.9 - CHEST PAIN, UNSPECIFIED   





Assessment/Plan





PLAN


HB noted


stable counts


cardiology eval 


check EKG and cardiac enzyme


She currently has a holter monitor 








pt willing for endoscopic procedures-- but will need to be stable cardiac wise 

prior to that 





Renal function better

## 2018-02-11 LAB
ANION GAP SERPL CALC-SCNC: 1 MMOL/L (ref 8–16)
BASOPHILS # BLD: 1.1 % (ref 0–2)
BUN SERPL-MCNC: 24 MG/DL (ref 7–18)
CALCIUM SERPL-MCNC: 8.4 MG/DL (ref 8.5–10.1)
CHLORIDE SERPL-SCNC: 113 MMOL/L (ref 98–107)
CO2 SERPL-SCNC: 26 MMOL/L (ref 21–32)
CREAT SERPL-MCNC: 1.8 MG/DL (ref 0.55–1.02)
DEPRECATED RDW RBC AUTO: 17.2 % (ref 11.6–15.6)
DEPRECATED RDW RBC AUTO: 17.7 % (ref 11.6–15.6)
EOSINOPHIL # BLD: 2.6 % (ref 0–4.5)
GLUCOSE SERPL-MCNC: 88 MG/DL (ref 74–106)
HCT VFR BLD CALC: 27.2 % (ref 32.4–45.2)
HCT VFR BLD CALC: 28.8 % (ref 32.4–45.2)
HGB BLD-MCNC: 8.7 GM/DL (ref 10.7–15.3)
HGB BLD-MCNC: 9 GM/DL (ref 10.7–15.3)
LYMPHOCYTES # BLD: 18.2 % (ref 8–40)
MCH RBC QN AUTO: 27.3 PG (ref 25.7–33.7)
MCH RBC QN AUTO: 27.4 PG (ref 25.7–33.7)
MCHC RBC AUTO-ENTMCNC: 31.3 G/DL (ref 32–36)
MCHC RBC AUTO-ENTMCNC: 31.9 G/DL (ref 32–36)
MCV RBC: 85.7 FL (ref 80–96)
MCV RBC: 87.6 FL (ref 80–96)
MONOCYTES # BLD AUTO: 7.7 % (ref 3.8–10.2)
NEUTROPHILS # BLD: 70.4 % (ref 42.8–82.8)
PLATELET # BLD AUTO: 143 K/MM3 (ref 134–434)
PLATELET # BLD AUTO: 152 K/MM3 (ref 134–434)
PMV BLD: 8.2 FL (ref 7.5–11.1)
PMV BLD: 8.3 FL (ref 7.5–11.1)
POTASSIUM SERPLBLD-SCNC: 5.3 MMOL/L (ref 3.5–5.1)
RBC # BLD AUTO: 3.18 M/MM3 (ref 3.6–5.2)
RBC # BLD AUTO: 3.29 M/MM3 (ref 3.6–5.2)
SODIUM SERPL-SCNC: 140 MMOL/L (ref 136–145)
WBC # BLD AUTO: 5.7 K/MM3 (ref 4–10)
WBC # BLD AUTO: 6.3 K/MM3 (ref 4–10)

## 2018-02-11 RX ADMIN — ALBUTEROL SULFATE PRN AMP: 2.5 SOLUTION RESPIRATORY (INHALATION) at 16:15

## 2018-02-11 RX ADMIN — POLYETHYLENE GLYCOL 3350 SCH GM: 17 POWDER, FOR SOLUTION ORAL at 09:51

## 2018-02-11 RX ADMIN — GABAPENTIN SCH MG: 100 CAPSULE ORAL at 21:14

## 2018-02-11 RX ADMIN — INSULIN ASPART SCH: 100 INJECTION, SOLUTION INTRAVENOUS; SUBCUTANEOUS at 11:01

## 2018-02-11 RX ADMIN — BUDESONIDE AND FORMOTEROL FUMARATE DIHYDRATE SCH PUFF: 160; 4.5 AEROSOL RESPIRATORY (INHALATION) at 09:52

## 2018-02-11 RX ADMIN — BUDESONIDE AND FORMOTEROL FUMARATE DIHYDRATE SCH PUFF: 160; 4.5 AEROSOL RESPIRATORY (INHALATION) at 21:14

## 2018-02-11 RX ADMIN — GABAPENTIN SCH MG: 100 CAPSULE ORAL at 06:28

## 2018-02-11 RX ADMIN — INSULIN ASPART SCH: 100 INJECTION, SOLUTION INTRAVENOUS; SUBCUTANEOUS at 06:26

## 2018-02-11 RX ADMIN — POLYETHYLENE GLYCOL 3350 SCH: 17 POWDER, FOR SOLUTION ORAL at 21:14

## 2018-02-11 RX ADMIN — INSULIN ASPART SCH: 100 INJECTION, SOLUTION INTRAVENOUS; SUBCUTANEOUS at 17:13

## 2018-02-11 RX ADMIN — ALBUTEROL SULFATE PRN AMP: 2.5 SOLUTION RESPIRATORY (INHALATION) at 20:55

## 2018-02-11 RX ADMIN — ALLOPURINOL SCH MG: 100 TABLET ORAL at 09:51

## 2018-02-11 RX ADMIN — ATORVASTATIN CALCIUM SCH MG: 20 TABLET, FILM COATED ORAL at 21:14

## 2018-02-11 RX ADMIN — VALSARTAN SCH MG: 160 TABLET, FILM COATED ORAL at 09:51

## 2018-02-11 RX ADMIN — AMLODIPINE BESYLATE SCH MG: 2.5 TABLET ORAL at 09:51

## 2018-02-11 RX ADMIN — GABAPENTIN SCH MG: 100 CAPSULE ORAL at 13:16

## 2018-02-11 RX ADMIN — PANTOPRAZOLE SODIUM SCH MG: 40 INJECTION, POWDER, FOR SOLUTION INTRAVENOUS at 09:51

## 2018-02-11 NOTE — PN
Progress Note (short form)





- Note


Progress Note: 








CC: pre-op clearance





 


 





cards: Dr. Torrez








 Current Medications





Albuterol Sulfate (Ventolin 0.083% Nebulizer Soln -)  1 amp NEB Q6H PRN


   PRN Reason: SHORT OF BREATH/WHEEZING


Allopurinol (Zyloprim -)  100 mg PO DAILY [facility]


   Last Admin: 02/11/18 09:51 Dose:  100 mg


Amlodipine Besylate (Norvasc -)  2.5 mg PO DAILY [facility]


   Last Admin: 02/11/18 09:51 Dose:  2.5 mg


Atorvastatin Calcium (Lipitor -)  20 mg PO HS [facility]


   Last Admin: 02/10/18 21:43 Dose:  20 mg


Budesonide/Formoterol Fumarate (Symbicort 160/4.5mcg -)  2 puff IH BID [facility]


   Last Admin: 02/11/18 09:52 Dose:  2 puff


Gabapentin (Neurontin -)  200 mg PO TID [facility]


   Last Admin: 02/11/18 13:16 Dose:  200 mg


Insulin Aspart (Novolog Vial Sliding Scale -)  1 vial SQ TIDAC [facility]


   PRN Reason: Protocol


   Last Admin: 02/11/18 11:01 Dose:  Not Given


Pantoprazole Sodium (Protonix Iv)  40 mg IVPUSH DAILY [facility]


   Last Admin: 02/11/18 09:51 Dose:  40 mg


Polyethylene Glycol (Miralax (For Daily Use) -)  17 gm PO BID [facility]


   Last Admin: 02/11/18 09:51 Dose:  17 gm


Valsartan (Diovan -)  320 mg PO DAILY [facility]


   Last Admin: 02/11/18 09:51 Dose:  320 mg














 Vital Signs - 24 hr











  02/10/18 02/10/18 02/10/18





  15:57 17:30 19:23


 


Temperature 98.6 F 98.6 F 


 


Pulse Rate 51 L 50 L 


 


Respiratory 18 20 20





Rate   


 


Blood Pressure 131/46 153/58 


 


O2 Sat by Pulse   96





Oximetry (%)   














  02/11/18 02/11/18 02/11/18





  01:00 05:00 09:00


 


Temperature 97.8 F 97.6 F 97.7 F


 


Pulse Rate 51 L 50 L 54 L


 


Respiratory 20 20 20





Rate   


 


Blood Pressure 132/50 146/47 132/59


 


O2 Sat by Pulse   96





Oximetry (%)   














  02/11/18





  14:00


 


Temperature 98 F


 


Pulse Rate 46 L


 


Respiratory 20





Rate 


 


Blood Pressure 151/69


 


O2 Sat by Pulse 





Oximetry (%) 

















 Intake & Output











 02/09/18 02/10/18 02/11/18 02/12/18





 07:59 07:59 07:59 07:59


 


Intake Total  


 


Balance  














nad, calm


jvd flat, neck supple


ctab, nl effort


rrr nl s1, s2 no mrg


+ bs soft nt nd, obese


no le e/c/c


+ dp/pt, no carotid bruits


aaox3


no jaundice, diaphoresis











 CBC, BMP





 02/11/18 10:00 





 02/11/18 07:00 


 Laboratory Tests











  02/10/18 02/10/18 02/11/18





  09:55 19:10 07:00


 


Creatine Kinase  124  117  123


 


Troponin I  0.49 H  0.83 H*  0.53 H











  








ekg 2/6: sb 55 bpm.  lad.  lafb.  new lateral twi.  


ekg 2/10: sb 48 bpm. lad. lafb.  slight improvement in lateral twave 

abnormality (now flat/biphasic)


tele: SB 40's-50's





cxr: no acute pathology





echo 12/2017 :nl lv/rv size/fn.  impaired relaxation.  1+ lae. nl valves.  nl 

ivc.  no rvsp. 





holter 12/2016: avg HR 56 bpm.  no bradyarrhythmias.  no sig pauses.  1% pvc 

burden.  one run of psvt (4 beats).  





stress test 2014: no ekg changes, sx's. bline hr 52 bpm.  nl perfusion/EF. 





A/P





88 yo with h/o HTN, venous insufficiency, DM, CKD, COPD, anemia, hyperkalemia, 

hypertension, gout, diverticulosis, who p/w [facility] hospital course c/b 

bradycardia, cp and troponin leak. 





pre-op clearance


- patient with episode of chest heavines this morning, subtle changes on her 

ekg from baseline and troponin leak.  


- transferred to tele.  


- Slight rise and fall pattern in response to episode of chest heaviness a few 

days after hgb drop (not 2/2 demand from anemia).  concern for cardiac etiology 

to cp.  Plan for stress test tomorrow for risk stratification.    


- discussed case with GI.  currently not a candidate for anti-platelets b/c of 

GIB.    


- Discussed complexity of managing possible cad with patient.  Can consider 

undergoing asa desensitization if necessary, but patient poor candidate for anti

-platelet therapy due to h/o recurrent GIB.  Will reassess options once stress 

test is complete.  





troponin elevation, presumed cad


- episode of chest heaviness here with resulting troponin leak.  Similar more 

severe episode 2 weeks ago.  Additionally patient with decreased exercise 

capacity/progressive gonzalez over the past few months. 


- ischemic eval as mentioned.  


- Mgm't of cad will be complicated in this patient.  She endorses ASA allergy (

angioedema).  Additionally discussed case with GI and currently not safe to 

place on empiric plavix.   


- no recurrence of CP.  If she has a recurrence will need to uptitrate norvasc 

as anti-anginal


- con't statin





bradycardia


- has longstanding hx of bradycardia, asx.  Patient had Holter monitor for 

evaluation of bradycardia 12/2016.   Holter placed here as well --> no sig 

bradyarrhythmias.  


  


htn


- stable on valsartan and low dose norvasc





venous insufficiency


- on lasix 80 mg po daily as outpatient.  has been held here.  Will con't to 

hold in case she has recurrence of gib.  No signs of volume overload at this 

time.  


- daily weights, i/o's.

## 2018-02-11 NOTE — HOL
Hook-up date: 2018-02-09   11:33:00       Duration: 23:53:00

Test Indications: BRADYCARDIA

Medications: 

 

 

 

 

 

 

 

 79180 QRS complexes

   114 Ventricular      ectopics which represent    <1 % of total QRS comp.

   145 Supraventricular ectopics which represent    <1 % of total QRS comp.

     * Paced QRS complexs        which represent  **** % of total QRS comp.

     1 % of Time Classified as Noise

VENTRICULAR ECTOPY

   108 Isolated

     0 Bigeminal Cycles

     3 Couplets

     0 Runs

     0 Beats in Runs

     * Beats LONGEST at   *  BPM at **:**:** ****-**-**

     * Beats FASTEST at   *  BPM at **:**:** ****-**-**

SUPRAVENTRICULAR ECTOPY

   133 Isolated

     4 Couplets

     1 Runs

     4 Beats in Runs

     4 Beats LONGEST at  98  BPM at 01:42:30 2018-02-10

     4 Beats FASTEST at  98  BPM at 01:42:30 2018-02-10

HEART RATES

    39 MIN at 23:24:17 2018-02-09

    49 AVG

    80 MAX at 12:54:27 2018-02-09

LONGEST RR

     2.008 secs at 01:11:52 2018-02-10   

SCANNED BY:  JARVIS  2/10

sinus bradycardia

longest RR interval  2 seconds

114 VPCs 3 couplets

133 APC 4 couplets . 1 run totaling 4 beats  at 98 bpm

 

 

Confirmed by BRIAN MICHELE MD ([id number]) on 2/11/2018 8:54:09 AM

Referred By: MING BANDA           Overread By: BRIAN MICHELE MD

## 2018-02-11 NOTE — PN
Progress Note (short form)





- Note


Progress Note: 





EGD/Colon being postponed secondary to Troponin elevation and chest heaviness


D/W Dr. Young: for stress test tomorrow and pending results will determined 

next course of action








Problem List





- Problems


(1) Lower GI bleeding


Code(s): K92.2 - GASTROINTESTINAL HEMORRHAGE, UNSPECIFIED

## 2018-02-11 NOTE — PN
Progress Note, Physician


Chief Complaint: 





 no further chest pain , no GI bleeding








- Current Medication List


Current Medications: 


Active Medications





Allopurinol (Zyloprim -)  100 mg PO DAILY [facility]


   Last Admin: 02/11/18 09:51 Dose:  100 mg


Amlodipine Besylate (Norvasc -)  2.5 mg PO DAILY [facility]


   Last Admin: 02/11/18 09:51 Dose:  2.5 mg


Atorvastatin Calcium (Lipitor -)  20 mg PO HS [facility]


   Last Admin: 02/10/18 21:43 Dose:  20 mg


Budesonide/Formoterol Fumarate (Symbicort 160/4.5mcg -)  2 puff IH BID [facility]


   Last Admin: 02/11/18 09:52 Dose:  2 puff


Gabapentin (Neurontin -)  200 mg PO TID [facility]


   Last Admin: 02/11/18 06:28 Dose:  200 mg


Insulin Aspart (Novolog Vial Sliding Scale -)  1 vial SQ TIDAC [facility]


   PRN Reason: Protocol


   Last Admin: 02/11/18 11:01 Dose:  Not Given


Pantoprazole Sodium (Protonix Iv)  40 mg IVPUSH DAILY [facility]


   Last Admin: 02/11/18 09:51 Dose:  40 mg


Polyethylene Glycol (Miralax (For Daily Use) -)  17 gm PO BID [facility]


   Last Admin: 02/11/18 09:51 Dose:  17 gm


Valsartan (Diovan -)  320 mg PO DAILY [facility]


   Last Admin: 02/11/18 09:51 Dose:  320 mg











- Objective


Vital Signs: 


 Vital Signs











Temperature  97.7 F   02/11/18 09:00


 


Pulse Rate  54 L  02/11/18 09:00


 


Respiratory Rate  20   02/11/18 09:00


 


Blood Pressure  132/59   02/11/18 09:00


 


O2 Sat by Pulse Oximetry (%)  96   02/11/18 09:00











Constitutional: Yes: No Distress


Cardiovascular: Yes: Regular Rate and Rhythm


Respiratory: Yes: Diminished


Gastrointestinal: Yes: Normal Bowel Sounds, Soft.  No: Tenderness


Edema: Yes


Edema: LLE: 1+, RLE: 1+


Labs: 


 CBC, BMP





 02/11/18 10:00 





 02/11/18 07:00 





 INR, PTT











INR  1.08  (0.82-1.09)   02/06/18  12:31    














Problem List





- Problems


(1) Diverticulosis


Code(s): K57.90 - DVRTCLOS OF INTEST, PART UNSP, W/O PERF OR ABSCESS W/O BLEED 

  


Qualifiers: 


   Diverticulosis site: diverticulosis of large intestine   Diverticulosis 

bleeding: diverticulosis with bleeding   Qualified Code(s): K57.31 - 

Diverticulosis of large intestine without perforation or abscess with bleeding 

  





(2) Lower GI bleeding


Code(s): K92.2 - GASTROINTESTINAL HEMORRHAGE, UNSPECIFIED   





(3) Acute on chronic renal failure


Code(s): N17.9 - ACUTE KIDNEY FAILURE, UNSPECIFIED; N18.9 - CHRONIC KIDNEY 

DISEASE, UNSPECIFIED   





(4) Anemia


Code(s): D64.9 - ANEMIA, UNSPECIFIED   





(5) Diabetes mellitus


Code(s): E11.9 - TYPE 2 DIABETES MELLITUS WITHOUT COMPLICATIONS   


Qualifiers: 


   Diabetes mellitus type: type 2 





(6) Chest pain


Code(s): R07.9 - CHEST PAIN, UNSPECIFIED   





Assessment/Plan





PLAN


HB noted


stable counts


cardiology eval -- spoke with DR Young-- will arrange for stress test 

tomorrow , will decide about endoscopic procedures afterwards


noted elevated cardiac  enzymes-- unable to give Plavix due to GI bleedig


She currently has a holter monitor 





 





Renal function better

## 2018-02-12 LAB
ANION GAP SERPL CALC-SCNC: 6 MMOL/L (ref 8–16)
BUN SERPL-MCNC: 25 MG/DL (ref 7–18)
CALCIUM SERPL-MCNC: 8.2 MG/DL (ref 8.5–10.1)
CHLORIDE SERPL-SCNC: 111 MMOL/L (ref 98–107)
CO2 SERPL-SCNC: 25 MMOL/L (ref 21–32)
CREAT SERPL-MCNC: 1.7 MG/DL (ref 0.55–1.02)
DEPRECATED RDW RBC AUTO: 17.3 % (ref 11.6–15.6)
GLUCOSE SERPL-MCNC: 84 MG/DL (ref 74–106)
HCT VFR BLD CALC: 26.7 % (ref 32.4–45.2)
HGB BLD-MCNC: 8.4 GM/DL (ref 10.7–15.3)
MCH RBC QN AUTO: 27.5 PG (ref 25.7–33.7)
MCHC RBC AUTO-ENTMCNC: 31.5 G/DL (ref 32–36)
MCV RBC: 87.3 FL (ref 80–96)
PLATELET # BLD AUTO: 143 K/MM3 (ref 134–434)
PMV BLD: 8.5 FL (ref 7.5–11.1)
POTASSIUM SERPLBLD-SCNC: 5 MMOL/L (ref 3.5–5.1)
RBC # BLD AUTO: 3.06 M/MM3 (ref 3.6–5.2)
SODIUM SERPL-SCNC: 142 MMOL/L (ref 136–145)
WBC # BLD AUTO: 6.4 K/MM3 (ref 4–10)

## 2018-02-12 RX ADMIN — INSULIN ASPART SCH: 100 INJECTION, SOLUTION INTRAVENOUS; SUBCUTANEOUS at 06:09

## 2018-02-12 RX ADMIN — AMLODIPINE BESYLATE SCH MG: 2.5 TABLET ORAL at 13:54

## 2018-02-12 RX ADMIN — BUDESONIDE AND FORMOTEROL FUMARATE DIHYDRATE SCH PUFF: 160; 4.5 AEROSOL RESPIRATORY (INHALATION) at 13:59

## 2018-02-12 RX ADMIN — GABAPENTIN SCH MG: 100 CAPSULE ORAL at 06:10

## 2018-02-12 RX ADMIN — ALBUTEROL SULFATE PRN AMP: 2.5 SOLUTION RESPIRATORY (INHALATION) at 08:06

## 2018-02-12 RX ADMIN — BUDESONIDE AND FORMOTEROL FUMARATE DIHYDRATE SCH PUFF: 160; 4.5 AEROSOL RESPIRATORY (INHALATION) at 22:27

## 2018-02-12 RX ADMIN — ATORVASTATIN CALCIUM SCH MG: 20 TABLET, FILM COATED ORAL at 22:27

## 2018-02-12 RX ADMIN — GABAPENTIN SCH MG: 100 CAPSULE ORAL at 13:57

## 2018-02-12 RX ADMIN — INSULIN ASPART SCH: 100 INJECTION, SOLUTION INTRAVENOUS; SUBCUTANEOUS at 17:27

## 2018-02-12 RX ADMIN — VALSARTAN SCH MG: 160 TABLET, FILM COATED ORAL at 13:54

## 2018-02-12 RX ADMIN — GABAPENTIN SCH MG: 100 CAPSULE ORAL at 22:27

## 2018-02-12 RX ADMIN — POLYETHYLENE GLYCOL 3350 SCH GM: 17 POWDER, FOR SOLUTION ORAL at 13:59

## 2018-02-12 RX ADMIN — ALLOPURINOL SCH MG: 100 TABLET ORAL at 13:54

## 2018-02-12 RX ADMIN — POLYETHYLENE GLYCOL 3350 SCH: 17 POWDER, FOR SOLUTION ORAL at 22:26

## 2018-02-12 RX ADMIN — INSULIN ASPART SCH: 100 INJECTION, SOLUTION INTRAVENOUS; SUBCUTANEOUS at 12:00

## 2018-02-12 RX ADMIN — PANTOPRAZOLE SODIUM SCH MG: 40 INJECTION, POWDER, FOR SOLUTION INTRAVENOUS at 13:54

## 2018-02-12 NOTE — PN
Progress Note (short form)





- Note


Progress Note: 


Pt seen/ examined .


comfortable


no further bleeding .


h/h stable


chart reviewed


denies pain





 Vital Signs











Temp  98.2 F   02/12/18 08:15


 


Pulse  42 L  02/12/18 08:15


 


Resp  16   02/12/18 09:00


 


BP  150/58   02/12/18 08:15


 


Pulse Ox  98   02/12/18 09:00








 Intake & Output











 02/11/18 02/12/18 02/12/18





 23:59 11:59 23:59


 


Intake Total 10  


 


Balance 10  


 


Intake:   


 


  IV 10  


 


    RH 10  


 


Other:   


 


  Voiding Method Toilet Toilet Toilet


 


  # Unmeasured Voids   


 


    Void 1 1 








Active Medications





Albuterol Sulfate (Ventolin 0.083% Nebulizer Soln -)  1 amp NEB Q6H PRN


   PRN Reason: SHORT OF BREATH/WHEEZING


   Last Admin: 02/12/18 08:06 Dose:  1 amp


Allopurinol (Zyloprim -)  100 mg PO DAILY [facility]


   Last Admin: 02/11/18 09:51 Dose:  100 mg


Amlodipine Besylate (Norvasc -)  2.5 mg PO DAILY [facility]


   Last Admin: 02/11/18 09:51 Dose:  2.5 mg


Atorvastatin Calcium (Lipitor -)  20 mg PO HS [facility]


   Last Admin: 02/11/18 21:14 Dose:  20 mg


Budesonide/Formoterol Fumarate (Symbicort 160/4.5mcg -)  2 puff IH BID [facility]


   Last Admin: 02/11/18 21:14 Dose:  2 puff


Gabapentin (Neurontin -)  200 mg PO TID [facility]


   Last Admin: 02/12/18 06:10 Dose:  200 mg


Insulin Aspart (Novolog Vial Sliding Scale -)  1 vial SQ TIDAC [facility]


   PRN Reason: Protocol


   Last Admin: 02/12/18 06:09 Dose:  Not Given


Pantoprazole Sodium (Protonix Iv)  40 mg IVPUSH DAILY [facility]


   Last Admin: 02/11/18 09:51 Dose:  40 mg


Polyethylene Glycol (Miralax (For Daily Use) -)  17 gm PO BID [facility]


   Last Admin: 02/11/18 21:14 Dose:  Not Given


Valsartan (Diovan -)  320 mg PO DAILY [facility]


   Last Admin: 02/11/18 09:51 Dose:  320 mg





 CBC, BMP





 02/12/18 06:40 





 02/12/18 06:40 


Physical Exam


Constitutional: Yes: No Distress. comfortable


Cardiovascular: Yes: Regular Rate and Rhythm


Respiratory: Yes: Diminished


Gastrointestinal: Yes: Normal Bowel Sounds, Soft.  No: Tenderness


Edema: LLE: 1+, RLE: 1+


Neuro- Alert/ awake





A/p


stable


stress test


continue present care


further recommendations after stress test


discussed with nursing staff also.


will follow


time spend 24 min





Problem List





- Problems


(1) Bradycardia


Code(s): R00.1 - BRADYCARDIA, UNSPECIFIED   





(2) Hypoglycemia associated with diabetes


Code(s): E11.649 - TYPE 2 DIABETES MELLITUS WITH HYPOGLYCEMIA WITHOUT COMA   





(3) Diverticulosis


Code(s): K57.90 - DVRTCLOS OF INTEST, PART UNSP, W/O PERF OR ABSCESS W/O BLEED 

  


Qualifiers: 


   Diverticulosis site: diverticulosis of large intestine   Diverticulosis 

bleeding: diverticulosis with bleeding   Qualified Code(s): K57.31 - 

Diverticulosis of large intestine without perforation or abscess with bleeding 

  





(4) Lower GI bleeding


Code(s): K92.2 - GASTROINTESTINAL HEMORRHAGE, UNSPECIFIED   





(5) Acute on chronic renal failure


Code(s): N17.9 - ACUTE KIDNEY FAILURE, UNSPECIFIED; N18.9 - CHRONIC KIDNEY 

DISEASE, UNSPECIFIED

## 2018-02-13 RX ADMIN — BUDESONIDE AND FORMOTEROL FUMARATE DIHYDRATE SCH PUFF: 160; 4.5 AEROSOL RESPIRATORY (INHALATION) at 21:24

## 2018-02-13 RX ADMIN — ATORVASTATIN CALCIUM SCH MG: 20 TABLET, FILM COATED ORAL at 21:17

## 2018-02-13 RX ADMIN — BUDESONIDE AND FORMOTEROL FUMARATE DIHYDRATE SCH PUFF: 160; 4.5 AEROSOL RESPIRATORY (INHALATION) at 10:23

## 2018-02-13 RX ADMIN — VALSARTAN SCH MG: 160 TABLET, FILM COATED ORAL at 10:09

## 2018-02-13 RX ADMIN — GABAPENTIN SCH MG: 100 CAPSULE ORAL at 21:17

## 2018-02-13 RX ADMIN — INSULIN ASPART SCH: 100 INJECTION, SOLUTION INTRAVENOUS; SUBCUTANEOUS at 11:38

## 2018-02-13 RX ADMIN — AMLODIPINE BESYLATE SCH MG: 2.5 TABLET ORAL at 10:09

## 2018-02-13 RX ADMIN — POLYETHYLENE GLYCOL 3350 SCH GM: 17 POWDER, FOR SOLUTION ORAL at 10:09

## 2018-02-13 RX ADMIN — GABAPENTIN SCH MG: 100 CAPSULE ORAL at 15:06

## 2018-02-13 RX ADMIN — GABAPENTIN SCH MG: 100 CAPSULE ORAL at 05:37

## 2018-02-13 RX ADMIN — INSULIN ASPART SCH: 100 INJECTION, SOLUTION INTRAVENOUS; SUBCUTANEOUS at 06:30

## 2018-02-13 RX ADMIN — POLYETHYLENE GLYCOL 3350 SCH: 17 POWDER, FOR SOLUTION ORAL at 21:18

## 2018-02-13 RX ADMIN — INSULIN ASPART SCH: 100 INJECTION, SOLUTION INTRAVENOUS; SUBCUTANEOUS at 16:25

## 2018-02-13 RX ADMIN — ALBUTEROL SULFATE PRN AMP: 2.5 SOLUTION RESPIRATORY (INHALATION) at 00:29

## 2018-02-13 RX ADMIN — PANTOPRAZOLE SODIUM SCH MG: 40 INJECTION, POWDER, FOR SOLUTION INTRAVENOUS at 10:23

## 2018-02-13 RX ADMIN — ALLOPURINOL SCH MG: 100 TABLET ORAL at 10:09

## 2018-02-13 NOTE — PN
GI Progress Note


Subjective: 





GI Note: Results of EST noted. Tomorrow's schedule is full. My next opportunity 

is Friday. Maria C remains agreeable to EGD and colonoscopy with possible 

rubber band ligation of hemorrhoids. Await to hear whether Dr Damon could 

do it on Thursday.    





- Objective


Vital Signs: 


 Vital Signs











Temperature  98.2 F   02/13/18 14:00


 


Pulse Rate  53 L  02/13/18 14:00


 


Respiratory Rate  18   02/13/18 14:00


 


Blood Pressure  153/54   02/13/18 14:00


 


O2 Sat by Pulse Oximetry (%)  100   02/13/18 09:00








 Laboratory Tests











  02/09/18 02/10/18 02/11/18





  21:55 09:55 06:45


 


Hgb  8.4 L  9.5 L D  8.7 L


 


BUN   


 


Creatinine   














  02/11/18 02/12/18 02/12/18





  10:00 06:40 06:40


 


Hgb  9.0 L  8.4 L 


 


BUN    25 H


 


Creatinine    1.7 H











Constitutional: No Distress


...Auscultate: Yes: Normoactive Bowel Sounds


...Palpate: Yes: Soft, Other (nontender)


Labs: 


 CBC, BMP





 02/12/18 06:40 





 02/12/18 06:40 





 INR, PTT











INR  1.08  (0.82-1.09)   02/06/18  12:31    














Problem List





- Problems


(1) Lower GI bleeding


Assessment/Plan: 


 Continue to suspect recurring hemorrhoidal bleeding which will hopefully be 

amenable to rubber band ligation. She also needs an EGD to exclude a UGI source 

of bleeding as contributing to her anemia. Maria C has signed an informed 

consent. I will schedule her for Friday now that cardiology has cleared her. 

Will move it up to Thursday if Dr. Shea can do it earlier. 


Code(s): K92.2 - GASTROINTESTINAL HEMORRHAGE, UNSPECIFIED   





(2) Diverticulosis


Code(s): K57.90 - DVRTCLOS OF INTEST, PART UNSP, W/O PERF OR ABSCESS W/O BLEED 

  


Qualifiers: 


   Diverticulosis site: diverticulosis of large intestine   Diverticulosis 

bleeding: diverticulosis with bleeding   Qualified Code(s): K57.31 - 

Diverticulosis of large intestine without perforation or abscess with bleeding 

  





(3) Anemia


Code(s): D64.9 - ANEMIA, UNSPECIFIED   





(4) Colon polyp


Code(s): K63.5 - POLYP OF COLON   





(5) Constipation


Code(s): K59.00 - CONSTIPATION, UNSPECIFIED   


Qualifiers: 


   Constipation type: slow transit constipation   Qualified Code(s): K59.01 - 

Slow transit constipation

## 2018-02-13 NOTE — PN
Progress Note, Physician


Chief Complaint: 





 no further chest pain , no GI bleeding


stress test noted





- Current Medication List


Current Medications: 


Active Medications





Albuterol Sulfate (Ventolin 0.083% Nebulizer Soln -)  1 amp NEB Q6H PRN


   PRN Reason: SHORT OF BREATH/WHEEZING


   Last Admin: 02/13/18 00:29 Dose:  1 amp


Allopurinol (Zyloprim -)  100 mg PO DAILY [facility]


   Last Admin: 02/13/18 10:09 Dose:  100 mg


Amlodipine Besylate (Norvasc -)  2.5 mg PO DAILY [facility]


   Last Admin: 02/13/18 10:09 Dose:  2.5 mg


Atorvastatin Calcium (Lipitor -)  20 mg PO HS [facility]


   Last Admin: 02/12/18 22:27 Dose:  20 mg


Budesonide/Formoterol Fumarate (Symbicort 160/4.5mcg -)  2 puff IH BID [facility]


   Last Admin: 02/13/18 10:23 Dose:  2 puff


Gabapentin (Neurontin -)  200 mg PO TID [facility]


   Last Admin: 02/13/18 05:37 Dose:  200 mg


Insulin Aspart (Novolog Vial Sliding Scale -)  1 vial SQ TIDAC [facility]


   PRN Reason: Protocol


   Last Admin: 02/13/18 11:38 Dose:  Not Given


Pantoprazole Sodium (Protonix Iv)  40 mg IVPUSH DAILY [facility]


   Last Admin: 02/13/18 10:23 Dose:  40 mg


Polyethylene Glycol (Miralax (For Daily Use) -)  17 gm PO BID [facility]


   Last Admin: 02/13/18 10:09 Dose:  17 gm


Valsartan (Diovan -)  320 mg PO DAILY [facility]


   Last Admin: 02/13/18 10:09 Dose:  320 mg











- Objective


Vital Signs: 


 Vital Signs











Temperature  98 F   02/13/18 09:40


 


Pulse Rate  50 L  02/13/18 09:40


 


Respiratory Rate  18   02/13/18 09:40


 


Blood Pressure  154/58   02/13/18 09:40


 


O2 Sat by Pulse Oximetry (%)  99   02/12/18 21:00











Constitutional: Yes: No Distress


Cardiovascular: Yes: Regular Rate and Rhythm


Respiratory: Yes: CTA Bilaterally


Gastrointestinal: Yes: Normal Bowel Sounds, Soft, Abdomen, Obese.  No: 

Tenderness


Edema: Yes


Edema: LLE: 1+, RLE: 1+


Labs: 


 CBC, BMP





 02/12/18 06:40 





 02/12/18 06:40 





 INR, PTT











INR  1.08  (0.82-1.09)   02/06/18  12:31    














Problem List





- Problems


(1) Diverticulosis


Code(s): K57.90 - DVRTCLOS OF INTEST, PART UNSP, W/O PERF OR ABSCESS W/O BLEED 

  


Qualifiers: 


   Diverticulosis site: diverticulosis of large intestine   Diverticulosis 

bleeding: diverticulosis with bleeding   Qualified Code(s): K57.31 - 

Diverticulosis of large intestine without perforation or abscess with bleeding 

  





(2) Lower GI bleeding


Code(s): K92.2 - GASTROINTESTINAL HEMORRHAGE, UNSPECIFIED   





(3) Acute on chronic renal failure


Code(s): N17.9 - ACUTE KIDNEY FAILURE, UNSPECIFIED; N18.9 - CHRONIC KIDNEY 

DISEASE, UNSPECIFIED   





(4) Anemia


Code(s): D64.9 - ANEMIA, UNSPECIFIED   





(5) Diabetes mellitus


Code(s): E11.9 - TYPE 2 DIABETES MELLITUS WITHOUT COMPLICATIONS   


Qualifiers: 


   Diabetes mellitus type: type 2 





(6) Chest pain


Code(s): R07.9 - CHEST PAIN, UNSPECIFIED   





Assessment/Plan





PLAN


HB noted


stable counts


stress test negative--for GI work  up





Renal function better

## 2018-02-13 NOTE — PN
Progress Note (short form)





- Note


Progress Note: 





CC: pre-op clearance





 


S: had stress test today.  prelim result - negative, awaiting final read.  no 

recurrence of chest heaviness, no sob, palps, dizziness.  No worsening of LE on 

lasix.  Diet is liquid. 





cards: Dr. Torrez








 Current Medications





Albuterol Sulfate (Ventolin 0.083% Nebulizer Soln -)  1 amp NEB Q6H PRN


   PRN Reason: SHORT OF BREATH/WHEEZING


Allopurinol (Zyloprim -)  100 mg PO DAILY [facility]


   Last Admin: 02/11/18 09:51 Dose:  100 mg


Amlodipine Besylate (Norvasc -)  2.5 mg PO DAILY [facility]


   Last Admin: 02/11/18 09:51 Dose:  2.5 mg


Atorvastatin Calcium (Lipitor -)  20 mg PO HS [facility]


   Last Admin: 02/10/18 21:43 Dose:  20 mg


Budesonide/Formoterol Fumarate (Symbicort 160/4.5mcg -)  2 puff IH BID [facility]


   Last Admin: 02/11/18 09:52 Dose:  2 puff


Gabapentin (Neurontin -)  200 mg PO TID [facility]


   Last Admin: 02/11/18 13:16 Dose:  200 mg


Insulin Aspart (Novolog Vial Sliding Scale -)  1 vial SQ TIDAC [facility]


   PRN Reason: Protocol


   Last Admin: 02/11/18 11:01 Dose:  Not Given


Pantoprazole Sodium (Protonix Iv)  40 mg IVPUSH DAILY [facility]


   Last Admin: 02/11/18 09:51 Dose:  40 mg


Polyethylene Glycol (Miralax (For Daily Use) -)  17 gm PO BID [facility]


   Last Admin: 02/11/18 09:51 Dose:  17 gm


Valsartan (Diovan -)  320 mg PO DAILY [facility]


   Last Admin: 02/11/18 09:51 Dose:  320 mg








 


 Vital Signs 











  02/12/18 02/12/18





  14:00 17:00


 


Temperature 98 F 99.0 F


 


Pulse Rate 44 L 54 L


 


Respiratory 14 18





Rate  


 


Blood Pressure 140/54 139/50


 


O2 Sat by Pulse  





Oximetry (%)  





 














nad, calm


jvd flat, neck supple


ctab, nl effort


rrr nl s1, s2 no mrg


+ bs soft nt nd, obese


no le e/c/c


+ dp/pt, no carotid bruits


aaox3


no jaundice, diaphoresis











  


 CBC, BMP





 02/12/18 06:40 





 02/12/18 06:40 











  








ekg 2/6: sb 55 bpm.  lad.  lafb.  new lateral twi.  


ekg 2/10: sb 48 bpm. lad. lafb.  slight improvement in lateral twave 

abnormality (now flat/biphasic)


tele: SB 40's-50's, occ pvc's





cxr: no acute pathology





echo 12/2017 :nl lv/rv size/fn.  impaired relaxation.  1+ lae. nl valves.  nl 

ivc.  no rvsp. 





holter 12/2016: avg HR 56 bpm.  no bradyarrhythmias.  no sig pauses.  1% pvc 

burden.  one run of psvt (4 beats).  





stress test 2014: no ekg changes, sx's. bline hr 52 bpm.  nl perfusion/EF. 





A/P





86 yo with h/o HTN, venous insufficiency, DM, CKD, COPD, anemia, hyperkalemia, 

hypertension, gout, diverticulosis, who p/w [facility] hospital course c/b 

bradycardia, cp and troponin leak. 





pre-op clearance


- patient with episode of chest heavines this morning, subtle changes on her 

ekg from baseline and troponin leak.  


- transferred to tele.  


- Slight rise and fall pattern in response to episode of chest heaviness a few 

days after hgb drop (not 2/2 demand from anemia).  concern for cardiac etiology 

to cp.  Plan for stress test tomorrow for risk stratification.    


- discussed case with GI.  currently not a candidate for anti-platelets b/c of 

GIB.    


- Discussed complexity of managing possible cad with patient.  Can consider 

undergoing asa desensitization if necessary, but patient poor candidate for anti

-platelet therapy due to h/o recurrent GIB.  Will reassess options once stress 

test is complete.  


- 2/12 awaiting results of stress test - prelim negative per report. 





troponin elevation, presumed cad


- episode of chest heaviness here with resulting troponin leak.  Similar more 

severe episode 2 weeks ago.  Additionally patient with decreased exercise 

capacity/progressive gonzalez over the past few months. 


- ischemic eval as mentioned.  


- Mgm't of cad will be complicated in this patient.  She endorses ASA allergy (

angioedema).  Additionally discussed case with GI and currently not safe to 

place on empiric plavix.   


- no recurrence of CP.  If she has a recurrence will need to uptitrate norvasc 

as anti-anginal


- con't statin





bradycardia


- has longstanding hx of bradycardia, asx.  Patient had Holter monitor for 

evaluation of bradycardia 12/2016.   Holter placed here as well --> no sig 

bradyarrhythmias.  


  


htn


- stable on valsartan and low dose norvasc





venous insufficiency


- on lasix 80 mg po daily as outpatient.  has been held here.  Will con't to 

hold in case she has recurrence of gib.  No signs of volume overload at this 

time.  


- daily weights, i/o's.

## 2018-02-13 NOTE — PN
Progress Note (short form)





- Note


Progress Note: 





CC: pre-op clearance





 


S: had stress test yesterday --> negative   no recurrence of chest heaviness, 

no sob, palps, dizziness.  Slight worsening of LE edema off  lasix today.  Diet 

is liquid. 





cards: Dr. Torrez








 


 Current Medications





Albuterol Sulfate (Ventolin 0.083% Nebulizer Soln -)  1 amp NEB Q6H PRN


   PRN Reason: SHORT OF BREATH/WHEEZING


   Last Admin: 02/13/18 00:29 Dose:  1 amp


Allopurinol (Zyloprim -)  100 mg PO DAILY [facility]


   Last Admin: 02/13/18 10:09 Dose:  100 mg


Amlodipine Besylate (Norvasc -)  2.5 mg PO DAILY [facility]


   Last Admin: 02/13/18 10:09 Dose:  2.5 mg


Atorvastatin Calcium (Lipitor -)  20 mg PO HS [facility]


   Last Admin: 02/12/18 22:27 Dose:  20 mg


Budesonide/Formoterol Fumarate (Symbicort 160/4.5mcg -)  2 puff IH BID [facility]


   Last Admin: 02/13/18 10:23 Dose:  2 puff


Gabapentin (Neurontin -)  200 mg PO TID [facility]


   Last Admin: 02/13/18 05:37 Dose:  200 mg


Insulin Aspart (Novolog Vial Sliding Scale -)  1 vial SQ TIDAC [facility]


   PRN Reason: Protocol


   Last Admin: 02/13/18 06:30 Dose:  Not Given


Pantoprazole Sodium (Protonix Iv)  40 mg IVPUSH DAILY [facility]


   Last Admin: 02/13/18 10:23 Dose:  40 mg


Polyethylene Glycol (Miralax (For Daily Use) -)  17 gm PO BID [facility]


   Last Admin: 02/13/18 10:09 Dose:  17 gm


Valsartan (Diovan -)  320 mg PO DAILY [facility]


   Last Admin: 02/13/18 10:09 Dose:  320 mg











 Vital Signs - 24 hr











  02/12/18 02/12/18 02/12/18





  14:00 17:00 21:00


 


Temperature 98 F 99.0 F 98.9 F


 


Pulse Rate 44 L 54 L 47 L


 


Respiratory 14 18 18





Rate   


 


Blood Pressure 140/54 139/50 129/52


 


O2 Sat by Pulse   99





Oximetry (%)   














  02/13/18 02/13/18 02/13/18





  02:00 06:00 09:40


 


Temperature 98.0 F  98 F


 


Pulse Rate 46 L 44 L 50 L


 


Respiratory 18 20 18





Rate   


 


Blood Pressure 123/43 115/49 154/58


 


O2 Sat by Pulse   





Oximetry (%)   














 Intake & Output











 02/11/18 02/12/18 02/13/18 02/14/18





 07:59 07:59 07:59 07:59


 


Intake Total 1180 10 480 


 


Balance 1180 10 480 


 


Weight   210 lb 12.8 oz 

















nad, calm


jvd flat, neck supple


ctab, nl effort


rrr nl s1, s2 no mrg


+ bs soft nt nd, obese


trace le edema. no c/c


+ dp/pt, no carotid bruits


aaox3


no jaundice, diaphoresis











 


 no CBC, BMP today





 02/12/18 06:40 





 02/12/18 06:40 








  








ekg 2/6: sb 55 bpm.  lad.  lafb.  new lateral twi.  


ekg 2/10: sb 48 bpm. lad. lafb.  slight improvement in lateral twave 

abnormality (now flat/biphasic)


tele: SB 40's-50's, occ pvc's





cxr: no acute pathology





echo 12/2017 :nl lv/rv size/fn.  impaired relaxation.  1+ lae. nl valves.  nl 

ivc.  no rvsp. 





holter 12/2016: avg HR 56 bpm.  no bradyarrhythmias.  no sig pauses.  1% pvc 

burden.  one run of psvt (4 beats).  





stress test 2/2018: No Ekg changes.  No ischemia.  nl perf/EF. 


stress test 2014: no ekg changes, sx's. bline hr 52 bpm.  nl perfusion/EF. 





A/P





88 yo with h/o HTN, venous insufficiency, DM, CKD, COPD, anemia, hyperkalemia, 

hypertension, gout, diverticulosis, who p/w [facility] hospital course c/b 

bradycardia, cp and troponin leak. 





pre-op clearance


- subtle changes on her admit ekg from baseline.  patient with episode of chest 

heaviness 2/10 --> subsequent slight rise and fall pattern in response to 

episode of chest heaviness (episode occurred a few days after hgb drop not 2/2 

demand from anemia). Concern for cardiac etiology to .  Patient was 

transferred to [facility] with plan for stress test   for risk stratification.    


- 2/11 discussed case with GI.  Patient not a candidate for anti-platelets b/c 

of GIB.  Discussed complexity of managing possible cad with patient due to ASA 

allergy and risk of bleeding on anti-platelets.   


- 2/12 awaiting results of stress test - prelim negative per report. 


- 2/13: Stress test negative.  No recurrence of chest heaviness.  No further 

testing needed prior to endoscopy.  Based on RCRI, functional status and age 

patient with intermediate estimated risk of mercedes-operative CV events.  Patient 

counseled on risk. In regard to bradycardia --> EKG is sinus alexx with normal 

intervals.  No bradyarrhythmias noted on tele monitoring and Holter.  No 

contraindication to anesthesia.  Would recommend avoidance of anesthesia that 

increases vagal tone when possible and monitor for apnea to avoid worsening 

bradycardia.  





troponin elevation,  


- episode of chest heaviness here 2/10 with resulting troponin leak as 

mentioned above.  Similar more severe episode 2 weeks ago.  Additionally 

patient with decreased exercise capacity/progressive gonzalez over the past few 

months, --> concern for angina --> ordered stress test.  No evidence of 

ischemia on stress testing.  As mentioned above, patient not a candidate for 

anti-platelets.  Con't medical therapy for primary prevention with statin.  


- no recurrence of CP 


- transfuse for hgb < 8.0





bradycardia


- has longstanding hx of bradycardia, asx.  Patient had Holter monitor for 

evaluation of bradycardia 12/2016.   Holter placed here as well --> no sig 

bradyarrhythmias.  Tele benign.  No contraindiation to anesthesia, see 

discussion above.  Ok to d/c tele. 


  


htn


- stable on valsartan and low dose norvasc





venous insufficiency


- on lasix 80 mg po daily (possibly bid) as outpatient.  has been held here.   


- 12/12 No signs of volume overload at this time and only on liquid diet  so 

will con't to hold.  


- 12/13: Today with slight worsening of LE edema, but cleared for endoscopy so 

will likely be getting  prep today.  Will hold lasix for today but plan on 

resuming after procedure tomorrow.  Ongoing monitoring of volume status with 

daily weights, I/o's.  








ok to d/c tele

## 2018-02-14 LAB
ANION GAP SERPL CALC-SCNC: 5 MMOL/L (ref 8–16)
BASOPHILS # BLD: 0.7 % (ref 0–2)
BUN SERPL-MCNC: 27 MG/DL (ref 7–18)
CALCIUM SERPL-MCNC: 8.7 MG/DL (ref 8.5–10.1)
CHLORIDE SERPL-SCNC: 111 MMOL/L (ref 98–107)
CO2 SERPL-SCNC: 25 MMOL/L (ref 21–32)
CREAT SERPL-MCNC: 1.8 MG/DL (ref 0.55–1.02)
DEPRECATED RDW RBC AUTO: 17.7 % (ref 11.6–15.6)
EOSINOPHIL # BLD: 2.3 % (ref 0–4.5)
GLUCOSE SERPL-MCNC: 92 MG/DL (ref 74–106)
HCT VFR BLD CALC: 25.2 % (ref 32.4–45.2)
HGB BLD-MCNC: 7.9 GM/DL (ref 10.7–15.3)
LYMPHOCYTES # BLD: 21.9 % (ref 8–40)
MAGNESIUM SERPL-MCNC: 2.5 MG/DL (ref 1.8–2.4)
MCH RBC QN AUTO: 27.3 PG (ref 25.7–33.7)
MCHC RBC AUTO-ENTMCNC: 31.5 G/DL (ref 32–36)
MCV RBC: 86.7 FL (ref 80–96)
MONOCYTES # BLD AUTO: 8 % (ref 3.8–10.2)
NEUTROPHILS # BLD: 67.1 % (ref 42.8–82.8)
PLATELET # BLD AUTO: 152 K/MM3 (ref 134–434)
PMV BLD: 9.4 FL (ref 7.5–11.1)
POTASSIUM SERPLBLD-SCNC: 5.1 MMOL/L (ref 3.5–5.1)
RBC # BLD AUTO: 2.9 M/MM3 (ref 3.6–5.2)
SODIUM SERPL-SCNC: 141 MMOL/L (ref 136–145)
WBC # BLD AUTO: 6.3 K/MM3 (ref 4–10)

## 2018-02-14 RX ADMIN — BUDESONIDE AND FORMOTEROL FUMARATE DIHYDRATE SCH PUFF: 160; 4.5 AEROSOL RESPIRATORY (INHALATION) at 09:13

## 2018-02-14 RX ADMIN — VALSARTAN SCH MG: 160 TABLET, FILM COATED ORAL at 09:10

## 2018-02-14 RX ADMIN — PANTOPRAZOLE SODIUM SCH MG: 40 INJECTION, POWDER, FOR SOLUTION INTRAVENOUS at 09:10

## 2018-02-14 RX ADMIN — INSULIN ASPART SCH: 100 INJECTION, SOLUTION INTRAVENOUS; SUBCUTANEOUS at 17:00

## 2018-02-14 RX ADMIN — AMLODIPINE BESYLATE SCH MG: 2.5 TABLET ORAL at 09:10

## 2018-02-14 RX ADMIN — GABAPENTIN SCH MG: 100 CAPSULE ORAL at 06:00

## 2018-02-14 RX ADMIN — ATORVASTATIN CALCIUM SCH MG: 20 TABLET, FILM COATED ORAL at 21:18

## 2018-02-14 RX ADMIN — ALLOPURINOL SCH MG: 100 TABLET ORAL at 09:10

## 2018-02-14 RX ADMIN — ALBUTEROL SULFATE PRN AMP: 2.5 SOLUTION RESPIRATORY (INHALATION) at 01:07

## 2018-02-14 RX ADMIN — GABAPENTIN SCH MG: 100 CAPSULE ORAL at 13:55

## 2018-02-14 RX ADMIN — INSULIN ASPART SCH: 100 INJECTION, SOLUTION INTRAVENOUS; SUBCUTANEOUS at 11:28

## 2018-02-14 RX ADMIN — GABAPENTIN SCH MG: 100 CAPSULE ORAL at 21:18

## 2018-02-14 RX ADMIN — BUDESONIDE AND FORMOTEROL FUMARATE DIHYDRATE SCH PUFF: 160; 4.5 AEROSOL RESPIRATORY (INHALATION) at 21:23

## 2018-02-14 RX ADMIN — POLYETHYLENE GLYCOL 3350 SCH: 17 POWDER, FOR SOLUTION ORAL at 09:14

## 2018-02-14 RX ADMIN — INSULIN ASPART SCH: 100 INJECTION, SOLUTION INTRAVENOUS; SUBCUTANEOUS at 06:01

## 2018-02-14 RX ADMIN — POLYETHYLENE GLYCOL 3350 SCH: 17 POWDER, FOR SOLUTION ORAL at 21:19

## 2018-02-14 NOTE — PN
Progress Note, Physician


Chief Complaint: 





 no further chest pain , no GI bleeding


stress test noted








- Current Medication List


Current Medications: 


Active Medications





Albuterol Sulfate (Ventolin 0.083% Nebulizer Soln -)  1 amp NEB Q6H PRN


   PRN Reason: SHORT OF BREATH/WHEEZING


   Last Admin: 02/14/18 01:07 Dose:  1 amp


Allopurinol (Zyloprim -)  100 mg PO DAILY [facility]


   Last Admin: 02/14/18 09:10 Dose:  100 mg


Amlodipine Besylate (Norvasc -)  2.5 mg PO DAILY [facility]


   Last Admin: 02/14/18 09:10 Dose:  2.5 mg


Atorvastatin Calcium (Lipitor -)  20 mg PO HS [facility]


   Last Admin: 02/13/18 21:17 Dose:  20 mg


Bisacodyl (Dulcolax -)  20 mg PO ONCE ONE


   Stop: 02/14/18 18:01


Budesonide/Formoterol Fumarate (Symbicort 160/4.5mcg -)  2 puff IH BID [facility]


   Last Admin: 02/14/18 09:13 Dose:  2 puff


Gabapentin (Neurontin -)  200 mg PO TID [facility]


   Last Admin: 02/14/18 06:00 Dose:  200 mg


Insulin Aspart (Novolog Vial Sliding Scale -)  1 vial SQ TIDAC [facility]


   PRN Reason: Protocol


   Last Admin: 02/14/18 11:28 Dose:  Not Given


Pantoprazole Sodium (Protonix Iv)  40 mg IVPUSH DAILY [facility]


   Last Admin: 02/14/18 09:10 Dose:  40 mg


Polyethylene Glycol (Miralax (For Daily Use) -)  17 gm PO BID [facility]


   Last Admin: 02/14/18 09:14 Dose:  Not Given


Valsartan (Diovan -)  320 mg PO DAILY [facility]


   Last Admin: 02/14/18 09:10 Dose:  320 mg











- Objective


Vital Signs: 


 Vital Signs











Temperature  97.8 F   02/14/18 08:04


 


Pulse Rate  59 L  02/14/18 08:04


 


Respiratory Rate  18   02/14/18 08:08


 


Blood Pressure  148/56   02/14/18 08:04


 


O2 Sat by Pulse Oximetry (%)  97   02/14/18 08:08











Constitutional: Yes: No Distress


Cardiovascular: Yes: Regular Rate and Rhythm


Respiratory: Yes: CTA Bilaterally


Gastrointestinal: Yes: Normal Bowel Sounds, Soft.  No: Tenderness


Edema: Yes


Edema: LLE: 1+, RLE: 1+


Labs: 


 CBC, BMP





 02/14/18 06:25 





 02/14/18 06:25 





 INR, PTT











INR  1.08  (0.82-1.09)   02/06/18  12:31    














Problem List





- Problems


(1) Diverticulosis


Code(s): K57.90 - DVRTCLOS OF INTEST, PART UNSP, W/O PERF OR ABSCESS W/O BLEED 

  


Qualifiers: 


   Diverticulosis site: diverticulosis of large intestine   Diverticulosis 

bleeding: diverticulosis with bleeding   Qualified Code(s): K57.31 - 

Diverticulosis of large intestine without perforation or abscess with bleeding 

  





(2) Lower GI bleeding


Code(s): K92.2 - GASTROINTESTINAL HEMORRHAGE, UNSPECIFIED   





(3) Acute on chronic renal failure


Code(s): N17.9 - ACUTE KIDNEY FAILURE, UNSPECIFIED; N18.9 - CHRONIC KIDNEY 

DISEASE, UNSPECIFIED   





(4) Anemia


Code(s): D64.9 - ANEMIA, UNSPECIFIED   





(5) Diabetes mellitus


Code(s): E11.9 - TYPE 2 DIABETES MELLITUS WITHOUT COMPLICATIONS   


Qualifiers: 


   Diabetes mellitus type: type 2 





(6) Chest pain


Code(s): R07.9 - CHEST PAIN, UNSPECIFIED   





Assessment/Plan





PLAN


HB noted





stress test negative--for GI work  up tomorrow


pt being prepped today 





Renal function better

## 2018-02-14 NOTE — PN
GI Progress Note


Subjective: 





GI Note: I discussed the case with Dr. Young this morning including the vagal 

tone associated with bowel prep, the colonoscopy itself and that expected from 

the discomfort of banded hemorrhoids when defecation occurs. Maria C will be 

kept in a monitored setting given these. Given the cardiac clearance I will 

initiate the prep today as Dr Shea will be able to perform the procedures 

tomorrow. 





- Objective


Vital Signs: 


 Vital Signs











Temperature  97.8 F   02/14/18 08:04


 


Pulse Rate  59 L  02/14/18 08:04


 


Respiratory Rate  18 02/14/18 08:08


 


Blood Pressure  148/56   02/14/18 08:04


 


O2 Sat by Pulse Oximetry (%)  97   02/14/18 08:08








 Laboratory Tests











  02/10/18 02/11/18 02/12/18





  09:55 10:00 06:40


 


Hgb  9.5 L D  9.0 L  8.4 L














  02/14/18





  06:25


 


Hgb  7.9 L











Constitutional: No Distress


...Auscultate: Yes: Normoactive Bowel Sounds


...Palpate: Yes: Soft, Other (nontender)


Labs: 


 CBC, BMP





 02/14/18 06:25 





 02/14/18 06:25 





 INR, PTT











INR  1.08  (0.82-1.09)   02/06/18  12:31    














Assessment/Plan





prep for EGD and colonoscopy and possible rubber band ligation of hemorrhoids 

tomorrow with Dr Mcclelland. Maria C has previously consented





Problem List





- Problems


(1) Lower GI bleeding


Code(s): K92.2 - GASTROINTESTINAL HEMORRHAGE, UNSPECIFIED   





(2) Diverticulosis


Code(s): K57.90 - DVRTCLOS OF INTEST, PART UNSP, W/O PERF OR ABSCESS W/O BLEED 

  


Qualifiers: 


   Diverticulosis site: diverticulosis of large intestine   Diverticulosis 

bleeding: diverticulosis with bleeding   Qualified Code(s): K57.31 - 

Diverticulosis of large intestine without perforation or abscess with bleeding 

  





(3) Anemia


Code(s): D64.9 - ANEMIA, UNSPECIFIED   





(4) Colon polyp


Code(s): K63.5 - POLYP OF COLON   





(5) Constipation


Code(s): K59.00 - CONSTIPATION, UNSPECIFIED   


Qualifiers: 


   Constipation type: slow transit constipation   Qualified Code(s): K59.01 - 

Slow transit constipation

## 2018-02-14 NOTE — PN
Progress Note (short form)





- Note


Progress Note: 


CC: pre-op clearance





 


S: no recurrence of chest heaviness, no sob, palps, dizziness.  











 


 Current Medications











Generic Name Dose Route Start Last Admin





  Trade Name Freq  PRN Reason Stop Dose Admin


 


Albuterol Sulfate  1 amp  02/11/18 12:25  02/14/18 01:07





  Ventolin 0.083% Nebulizer Soln -  NEB   1 amp





  Q6H PRN   Administration





  SHORT OF BREATH/WHEEZING   


 


Allopurinol  100 mg  02/08/18 10:00  02/14/18 09:10





  Zyloprim -  PO   100 mg





  DAILY ELLA   Administration


 


Amlodipine Besylate  2.5 mg  02/08/18 10:00  02/14/18 09:10





  Norvasc -  PO   2.5 mg





  DAILY E[facility]A   Administration


 


Atorvastatin Calcium  20 mg  02/07/18 22:00  02/13/18 21:17





  Lipitor -  PO   20 mg





  HS [facility]   Administration


 


Bisacodyl  20 mg  02/14/18 18:00  





  Dulcolax -  PO  02/14/18 18:01  





  ONCE ONE   


 


Budesonide/Formoterol Fumarate  2 puff  02/07/18 22:00  02/14/18 09:13





  Symbicort 160/4.5mcg -  IH   2 puff





  BID [facility]   Administration


 


Gabapentin  200 mg  02/07/18 15:15  02/14/18 06:00





  Neurontin -  PO   200 mg





  TID [facility]   Administration


 


Insulin Aspart  1 vial  02/07/18 07:00  02/14/18 11:28





  Novolog Vial Sliding Scale -  SQ   Not Given





  [facility]   





  Protocol   


 


Pantoprazole Sodium  40 mg  02/06/18 17:45  02/14/18 09:10





  Protonix Iv  IVPUSH   40 mg





  DAILY E[facility]A   Administration


 


Polyethylene Glycol  17 gm  02/06/18 22:00  02/14/18 09:14





  Miralax (For Daily Use) -  PO   Not Given





  BID [facility]   


 


Valsartan  320 mg  02/08/18 10:00  02/14/18 09:10





  Diovan -  PO   320 mg





  DAILY [facility]A   Administration








 Vital Signs











 Period  Temp  Pulse  Resp  BP Sys/Boothe  Pulse Ox


 


 Last 24 Hr  97.8 F-98.6 F  52-67  18-18  144-159/49-77  97-97














nad, calm


jvd flat, neck supple


ctab, nl effort


rrr nl s1, s2 no mrg


+ bs soft nt nd, obese


trace le edema. no c/c


aaox3


no jaundice, diaphoresis











 


 


 CBC, BMP





 02/14/18 06:25 





 02/14/18 06:25 








  





ekg 2/6: sb 55 bpm.  lad.  lafb.  new lateral twi.  


ekg 2/10: sb 48 bpm. lad. lafb.  slight improvement in lateral twave 

abnormality (now flat/biphasic)


tele: SB 40's-50's, occ pvc's





cxr: no acute pathology





echo 12/2017 :nl lv/rv size/fn.  impaired relaxation.  1+ lae. nl valves.  nl 

ivc.  no rvsp. 





holter 12/2016: avg HR 56 bpm.  no bradyarrhythmias.  no sig pauses.  1% pvc 

burden.  one run of psvt (4 beats).  





stress test 2/2018: No Ekg changes.  No ischemia.  nl perf/EF. 


stress test 2014: no ekg changes, sx's. bline hr 52 bpm.  nl perfusion/EF. 





A/P





88 yo with h/o HTN, venous insufficiency, DM, CKD, COPD, anemia, hyperkalemia, 

hypertension, gout, diverticulosis, who p/w [facility] hospital course c/b 

bradycardia, cp and troponin leak. 





pre-op clearance


- subtle changes on her admit ekg from baseline.  patient with episode of chest 

heaviness 2/10 --> subsequent slight rise and fall pattern in response to 

episode of chest heaviness (episode occurred a few days after hgb drop not 2/2 

demand from anemia). Concern for cardiac etiology to cp.  Patient was 

transferred to [facility] with plan for stress test   for risk stratification.    


- 2/11 discussed case with GI.  Patient not a candidate for anti-platelets b/c 

of GIB.  Discussed complexity of managing possible cad with patient due to ASA 

allergy and risk of bleeding on anti-platelets.   


- 2/12 awaiting results of stress test - prelim negative per report. 


- 2/13-14: Stress test negative.  No recurrence of chest heaviness.  No further 

testing needed prior to endoscopy.  Based on RCRI, functional status and age 

patient with intermediate estimated risk of mercedes-operative CV events.  Patient 

counseled on risk. In regard to bradycardia --> EKG is sinus alexx with normal 

intervals.  No bradyarrhythmias noted on tele monitoring and Holter.  No 

contraindication to anesthesia.  Would recommend avoidance of anesthesia that 

increases vagal tone when possible and monitor for apnea to avoid worsening 

bradycardia.  





troponin elevation,  


- episode of chest heaviness here 2/10 with resulting troponin leak as 

mentioned above.  Similar more severe episode 2 weeks ago.  Additionally 

patient with decreased exercise capacity/progressive gonzalez over the past few 

months, --> concern for angina --> ordered stress test.  No evidence of 

ischemia on stress testing.  As mentioned above, patient not a candidate for 

anti-platelets.  Con't medical therapy for primary prevention with statin.  


- no recurrence of CP 


- transfuse for hgb < 8.0





bradycardia


- has longstanding hx of bradycardia, asx.  Patient had Holter monitor for 

evaluation of bradycardia 12/2016.   Holter placed here as well --> no sig 

bradyarrhythmias.  Tele benign.  No contraindiation to anesthesia, see 

discussion above. 


  


htn


- stable on valsartan and low dose norvasc





venous insufficiency


- on lasix 80 mg po daily as outpatient.  has been held here and now with some 

le edema, got dose today, will resume after FOC








will keep on tele as her hemroid banding can cause vagal episode and would like 

to monitor.

## 2018-02-15 LAB
ALBUMIN SERPL-MCNC: 3 G/DL (ref 3.4–5)
ALP SERPL-CCNC: 97 U/L (ref 45–117)
ALT SERPL-CCNC: 17 U/L (ref 12–78)
ANION GAP SERPL CALC-SCNC: 7 MMOL/L (ref 8–16)
AST SERPL-CCNC: 17 U/L (ref 15–37)
BASOPHILS # BLD: 0.7 % (ref 0–2)
BILIRUB SERPL-MCNC: 0.5 MG/DL (ref 0.2–1)
BUN SERPL-MCNC: 23 MG/DL (ref 7–18)
CALCIUM SERPL-MCNC: 8.9 MG/DL (ref 8.5–10.1)
CHLORIDE SERPL-SCNC: 108 MMOL/L (ref 98–107)
CO2 SERPL-SCNC: 27 MMOL/L (ref 21–32)
CREAT SERPL-MCNC: 1.7 MG/DL (ref 0.55–1.02)
DEPRECATED RDW RBC AUTO: 18.1 % (ref 11.6–15.6)
EOSINOPHIL # BLD: 2.3 % (ref 0–4.5)
GLUCOSE SERPL-MCNC: 80 MG/DL (ref 74–106)
HCT VFR BLD CALC: 27.1 % (ref 32.4–45.2)
HGB BLD-MCNC: 8.6 GM/DL (ref 10.7–15.3)
LYMPHOCYTES # BLD: 17.7 % (ref 8–40)
MCH RBC QN AUTO: 27.7 PG (ref 25.7–33.7)
MCHC RBC AUTO-ENTMCNC: 31.7 G/DL (ref 32–36)
MCV RBC: 87.2 FL (ref 80–96)
MONOCYTES # BLD AUTO: 8.5 % (ref 3.8–10.2)
NEUTROPHILS # BLD: 70.8 % (ref 42.8–82.8)
PLATELET # BLD AUTO: 162 K/MM3 (ref 134–434)
PMV BLD: 9.6 FL (ref 7.5–11.1)
POTASSIUM SERPLBLD-SCNC: 4.7 MMOL/L (ref 3.5–5.1)
PROT SERPL-MCNC: 6.4 G/DL (ref 6.4–8.2)
RBC # BLD AUTO: 3.1 M/MM3 (ref 3.6–5.2)
SODIUM SERPL-SCNC: 142 MMOL/L (ref 136–145)
WBC # BLD AUTO: 6.1 K/MM3 (ref 4–10)

## 2018-02-15 PROCEDURE — 0DJD8ZZ INSPECTION OF LOWER INTESTINAL TRACT, VIA NATURAL OR ARTIFICIAL OPENING ENDOSCOPIC: ICD-10-PCS | Performed by: INTERNAL MEDICINE

## 2018-02-15 PROCEDURE — 0DD58ZX EXTRACTION OF ESOPHAGUS, VIA NATURAL OR ARTIFICIAL OPENING ENDOSCOPIC, DIAGNOSTIC: ICD-10-PCS | Performed by: INTERNAL MEDICINE

## 2018-02-15 RX ADMIN — AMLODIPINE BESYLATE SCH MG: 2.5 TABLET ORAL at 09:06

## 2018-02-15 RX ADMIN — ALLOPURINOL SCH MG: 100 TABLET ORAL at 09:06

## 2018-02-15 RX ADMIN — ATORVASTATIN CALCIUM SCH MG: 20 TABLET, FILM COATED ORAL at 22:22

## 2018-02-15 RX ADMIN — ALBUTEROL SULFATE PRN AMP: 2.5 SOLUTION RESPIRATORY (INHALATION) at 08:02

## 2018-02-15 RX ADMIN — GABAPENTIN SCH MG: 100 CAPSULE ORAL at 22:22

## 2018-02-15 RX ADMIN — BUDESONIDE AND FORMOTEROL FUMARATE DIHYDRATE SCH PUFF: 160; 4.5 AEROSOL RESPIRATORY (INHALATION) at 09:05

## 2018-02-15 RX ADMIN — ALBUTEROL SULFATE PRN AMP: 2.5 SOLUTION RESPIRATORY (INHALATION) at 16:17

## 2018-02-15 RX ADMIN — INSULIN ASPART SCH: 100 INJECTION, SOLUTION INTRAVENOUS; SUBCUTANEOUS at 06:13

## 2018-02-15 RX ADMIN — GABAPENTIN SCH: 100 CAPSULE ORAL at 05:29

## 2018-02-15 RX ADMIN — BUDESONIDE AND FORMOTEROL FUMARATE DIHYDRATE SCH PUFF: 160; 4.5 AEROSOL RESPIRATORY (INHALATION) at 22:22

## 2018-02-15 RX ADMIN — INSULIN ASPART SCH: 100 INJECTION, SOLUTION INTRAVENOUS; SUBCUTANEOUS at 17:43

## 2018-02-15 RX ADMIN — INSULIN ASPART SCH: 100 INJECTION, SOLUTION INTRAVENOUS; SUBCUTANEOUS at 12:20

## 2018-02-15 RX ADMIN — POLYETHYLENE GLYCOL 3350 SCH: 17 POWDER, FOR SOLUTION ORAL at 09:06

## 2018-02-15 RX ADMIN — POLYETHYLENE GLYCOL 3350 SCH: 17 POWDER, FOR SOLUTION ORAL at 22:22

## 2018-02-15 RX ADMIN — ALBUTEROL SULFATE PRN AMP: 2.5 SOLUTION RESPIRATORY (INHALATION) at 21:15

## 2018-02-15 RX ADMIN — GABAPENTIN SCH MG: 100 CAPSULE ORAL at 13:18

## 2018-02-15 RX ADMIN — PANTOPRAZOLE SODIUM SCH MG: 40 INJECTION, POWDER, FOR SOLUTION INTRAVENOUS at 09:06

## 2018-02-15 RX ADMIN — VALSARTAN SCH MG: 160 TABLET, FILM COATED ORAL at 09:06

## 2018-02-15 NOTE — PN
Progress Note (short form)





- Note


Progress Note: 





EGD/Colon complete. Procedure reports placed in procedural section of physical 

chart and to be scanned into [facility]








Problem List





- Problems


(1) Lower GI bleeding


Code(s): K92.2 - GASTROINTESTINAL HEMORRHAGE, UNSPECIFIED

## 2018-02-15 NOTE — PN
Progress Note, Physician


Chief Complaint: 





came back from endoscopy


no complaints


son at bedside





- Current Medication List


Current Medications: 


Active Medications





Albuterol Sulfate (Ventolin 0.083% Nebulizer Soln -)  1 amp NEB Q6H PRN


   PRN Reason: SHORT OF BREATH/WHEEZING


   Last Admin: 02/15/18 08:02 Dose:  1 amp


Allopurinol (Zyloprim -)  100 mg PO DAILY [facility]


   Last Admin: 02/15/18 09:06 Dose:  100 mg


Amlodipine Besylate (Norvasc -)  2.5 mg PO DAILY [facility]


   Last Admin: 02/15/18 09:06 Dose:  2.5 mg


Atorvastatin Calcium (Lipitor -)  20 mg PO HS [facility]


   Last Admin: 02/14/18 21:18 Dose:  20 mg


Budesonide/Formoterol Fumarate (Symbicort 160/4.5mcg -)  2 puff IH BID [facility]


   Last Admin: 02/15/18 09:05 Dose:  2 puff


Furosemide (Lasix -)  80 mg PO DAILY [facility]


Gabapentin (Neurontin -)  200 mg PO TID [facility]


   Last Admin: 02/15/18 05:29 Dose:  Not Given


Insulin Aspart (Novolog Vial Sliding Scale -)  1 vial SQ TIDAC [facility]


   PRN Reason: Protocol


   Last Admin: 02/15/18 06:13 Dose:  Not Given


Pantoprazole Sodium (Protonix Iv)  40 mg IVPUSH DAILY [facility]


   Last Admin: 02/15/18 09:06 Dose:  40 mg


Polyethylene Glycol (Miralax (For Daily Use) -)  17 gm PO BID [facility]


   Last Admin: 02/15/18 09:06 Dose:  Not Given


Valsartan (Diovan -)  320 mg PO DAILY [facility]


   Last Admin: 02/15/18 09:06 Dose:  320 mg











- Objective


Vital Signs: 


 Vital Signs











Temperature  98.3 F   02/15/18 10:56


 


Pulse Rate  45 L  02/15/18 11:11


 


Respiratory Rate  15   02/15/18 11:11


 


Blood Pressure  114/85   02/15/18 11:11


 


O2 Sat by Pulse Oximetry (%)  99   02/15/18 11:11











Constitutional: Yes: No Distress


Cardiovascular: Yes: Regular Rate and Rhythm


Respiratory: Yes: CTA Bilaterally


Gastrointestinal: Yes: Normal Bowel Sounds, Soft.  No: Tenderness


Edema: Yes


Edema: LLE: 1+, RLE: 1+


Labs: 


 CBC, BMP





 02/15/18 06:15 





 02/15/18 06:15 





 INR, PTT











INR  1.08  (0.82-1.09)   02/06/18  12:31    














Problem List





- Problems


(1) Diverticulosis


Code(s): K57.90 - DVRTCLOS OF INTEST, PART UNSP, W/O PERF OR ABSCESS W/O BLEED 

  


Qualifiers: 


   Diverticulosis site: diverticulosis of large intestine   Diverticulosis 

bleeding: diverticulosis with bleeding   Qualified Code(s): K57.31 - 

Diverticulosis of large intestine without perforation or abscess with bleeding 

  





(2) Lower GI bleeding


Code(s): K92.2 - GASTROINTESTINAL HEMORRHAGE, UNSPECIFIED   





(3) Acute on chronic renal failure


Code(s): N17.9 - ACUTE KIDNEY FAILURE, UNSPECIFIED; N18.9 - CHRONIC KIDNEY 

DISEASE, UNSPECIFIED   





(4) Anemia


Code(s): D64.9 - ANEMIA, UNSPECIFIED   





(5) Diabetes mellitus


Code(s): E11.9 - TYPE 2 DIABETES MELLITUS WITHOUT COMPLICATIONS   


Qualifiers: 


   Diabetes mellitus type: type 2 





(6) Chest pain


Code(s): R07.9 - CHEST PAIN, UNSPECIFIED   





Assessment/Plan





PLAN


HB noted


EGD-- Duodenitis, ?fungal patch-- await pathology


Colonoscopy-- ext hemorrhoids, diverticulosis


no active bleeding


monitor in telemetry 


pt resume regular diet per GI today 








check labs in AM

## 2018-02-15 NOTE — PN
Progress Note (short form)





- Note


Progress Note: 











CC: pre-op clearance





 


S: s/p endoscopy today.  States LE edema worsened and painful.  no cp, palps, 

dizziness, sob.  


 





 Current Medications





Albuterol Sulfate (Ventolin 0.083% Nebulizer Soln -)  1 amp NEB Q6H PRN


   PRN Reason: SHORT OF BREATH/WHEEZING


   Last Admin: 02/15/18 16:17 Dose:  1 amp


Allopurinol (Zyloprim -)  100 mg PO DAILY [facility]


   Last Admin: 02/15/18 09:06 Dose:  100 mg


Amlodipine Besylate (Norvasc -)  2.5 mg PO DAILY [facility]


   Last Admin: 02/15/18 09:06 Dose:  2.5 mg


Atorvastatin Calcium (Lipitor -)  20 mg PO HS [facility]


   Last Admin: 02/14/18 21:18 Dose:  20 mg


Budesonide/Formoterol Fumarate (Symbicort 160/4.5mcg -)  2 puff IH BID [facility]


   Last Admin: 02/15/18 09:05 Dose:  2 puff


Furosemide (Lasix -)  80 mg PO DAILY [facility]


Gabapentin (Neurontin -)  200 mg PO TID [facility]


   Last Admin: 02/15/18 13:18 Dose:  200 mg


Insulin Aspart (Novolog Vial Sliding Scale -)  1 vial SQ TIDAC [facility]


   PRN Reason: Protocol


   Last Admin: 02/15/18 17:43 Dose:  Not Given


Pantoprazole Sodium (Protonix Iv)  40 mg IVPUSH DAILY [facility]


   Last Admin: 02/15/18 09:06 Dose:  40 mg


Polyethylene Glycol (Miralax (For Daily Use) -)  17 gm PO BID [facility]


   Last Admin: 02/15/18 09:06 Dose:  Not Given


Valsartan (Diovan -)  320 mg PO DAILY [facility]


   Last Admin: 02/15/18 09:06 Dose:  320 mg











 Vital Signs - 24 hr











  02/14/18 02/14/18 02/15/18





  21:00 22:18 02:00


 


Temperature  98.3 F 98.1 F


 


Pulse Rate  54 L 56 L


 


Respiratory 18 18 20





Rate   


 


Blood Pressure  137/58 140/50


 


O2 Sat by Pulse 97  





Oximetry (%)   














  02/15/18 02/15/18 02/15/18





  06:00 09:00 10:00


 


Temperature 97.9 F  98.3 F


 


Pulse Rate 51 L  52 L


 


Respiratory 20 20 20





Rate   


 


Blood Pressure 145/63  118/61


 


O2 Sat by Pulse  97 





Oximetry (%)   














  02/15/18 02/15/18 02/15/18





  10:56 11:11 11:26


 


Temperature 98.3 F  


 


Pulse Rate 46 L 45 L 46 L


 


Respiratory 18 15 22





Rate   


 


Blood Pressure 130/47 114/85 144/71


 


O2 Sat by Pulse 99 99 100





Oximetry (%)   














  02/15/18 02/15/18





  11:44 15:11


 


Temperature 98.3 F 98.5 F


 


Pulse Rate 51 L 54 L


 


Respiratory 22 20





Rate  


 


Blood Pressure 139/62 130/81


 


O2 Sat by Pulse 100 





Oximetry (%)  














 Intake & Output











 02/13/18 02/14/18 02/15/18 02/16/18





 07:59 07:59 07:59 07:59


 


Intake Total 480 560 600 200


 


Balance 480 560 600 200


 


Weight 210 lb 12.8 oz 205 lb 9.6 oz 207 lb 6.4 oz 




















nad, calm


jvd flat, neck supple


ctab, nl effort


rrr nl s1, s2 no mrg


+ bs soft nt nd, obese


trace le edema. no c/c


aaox3


no jaundice, diaphoresis











 


 


 CBC, BMP





 02/15/18 06:15 





 02/15/18 06:15 








  





ekg 2/6: sb 55 bpm.  lad.  lafb.  new lateral twi.  


ekg 2/10: sb 48 bpm. lad. lafb.  slight improvement in lateral twave 

abnormality (now flat/biphasic)


tele: SB 40's-50's, occ pvc's





cxr: no acute pathology





echo 12/2017 :nl lv/rv size/fn.  impaired relaxation.  1+ lae. nl valves.  nl 

ivc.  no rvsp. 





holter 12/2016: avg HR 56 bpm.  no bradyarrhythmias.  no sig pauses.  1% pvc 

burden.  one run of psvt (4 beats).  





stress test 2/2018: No Ekg changes.  No ischemia.  nl perf/EF. 


stress test 2014: no ekg changes, sx's. bline hr 52 bpm.  nl perfusion/EF. 





A/P





86 yo with h/o HTN, venous insufficiency, DM, CKD, COPD, anemia, hyperkalemia, 

hypertension, gout, diverticulosis, who p/w [facility] hospital course c/b 

bradycardia, cp and troponin leak. 





pre-op clearance


- subtle changes on her admit ekg from baseline.  patient with episode of chest 

heaviness 2/10 --> subsequent slight rise and fall pattern in response to 

episode of chest heaviness (episode occurred a few days after hgb drop not 2/2 

demand from anemia). Concern for cardiac etiology to cp.  Patient was 

transferred to tele with plan for stress test   for risk stratification.    


- 2/11 discussed case with GI.  Patient not a candidate for anti-platelets b/c 

of GIB.  Discussed complexity of managing possible cad with patient due to ASA 

allergy and risk of bleeding on anti-platelets.   


- 2/12 awaiting results of stress test - prelim negative per report. 


- 2/13-14: Stress test negative.  No recurrence of chest heaviness.  No further 

testing needed prior to endoscopy.  Based on RCRI, functional status and age 

patient with intermediate estimated risk of mercedes-operative CV events.  Patient 

counseled on risk. In regard to bradycardia --> EKG is sinus alexx with normal 

intervals.  No bradyarrhythmias noted on tele monitoring and Holter.  No 

contraindication to anesthesia.  Would recommend avoidance of anesthesia that 

increases vagal tone when possible and monitor for apnea to avoid worsening 

bradycardia.  


- 2/15: s/p endoscopy, hr/bp controlled. 





troponin elevation,  


- episode of chest heaviness here 2/10 with resulting troponin leak as 

mentioned above.  Similar more severe episode 2 weeks ago.  Additionally 

patient with decreased exercise capacity/progressive gonzalez over the past few 

months, --> concern for angina --> ordered stress test.  No evidence of 

ischemia on stress testing.  As mentioned above, patient not a candidate for 

anti-platelets.  Con't medical therapy for primary prevention with statin.  


- no recurrence of CP 


- transfuse for hgb < 8.0





bradycardia


- has longstanding hx of bradycardia, asx.  Patient had Holter monitor for 

evaluation of bradycardia 12/2016.   Holter placed here as well --> no sig 

bradyarrhythmias.  Tele benign.  No contraindiation to anesthesia, see 

discussion above. 


  


htn


- stable on valsartan and low dose norvasc





venous insufficiency


- on lasix 80 mg po daily as outpatient.  has been held here and now with some 

le edema, got dose today, will resume after FOC


- 2/15: today with worsening LE edema.  will give IV dose tomorrow and then 

consider resuming home po dosing.

## 2018-02-16 LAB
ALBUMIN SERPL-MCNC: 2.6 G/DL (ref 3.4–5)
ALP SERPL-CCNC: 94 U/L (ref 45–117)
ALT SERPL-CCNC: 15 U/L (ref 12–78)
ANION GAP SERPL CALC-SCNC: 6 MMOL/L (ref 8–16)
AST SERPL-CCNC: 24 U/L (ref 15–37)
BASOPHILS # BLD: 0.5 % (ref 0–2)
BASOPHILS # BLD: 0.6 % (ref 0–2)
BILIRUB SERPL-MCNC: 0.4 MG/DL (ref 0.2–1)
BUN SERPL-MCNC: 27 MG/DL (ref 7–18)
CALCIUM SERPL-MCNC: 8.5 MG/DL (ref 8.5–10.1)
CHLORIDE SERPL-SCNC: 110 MMOL/L (ref 98–107)
CO2 SERPL-SCNC: 27 MMOL/L (ref 21–32)
CREAT SERPL-MCNC: 2 MG/DL (ref 0.55–1.02)
DEPRECATED RDW RBC AUTO: 17.5 % (ref 11.6–15.6)
DEPRECATED RDW RBC AUTO: 17.8 % (ref 11.6–15.6)
EOSINOPHIL # BLD: 1.2 % (ref 0–4.5)
EOSINOPHIL # BLD: 1.4 % (ref 0–4.5)
GLUCOSE SERPL-MCNC: 92 MG/DL (ref 74–106)
HCT VFR BLD CALC: 24.5 % (ref 32.4–45.2)
HCT VFR BLD CALC: 25.2 % (ref 32.4–45.2)
HGB BLD-MCNC: 7.8 GM/DL (ref 10.7–15.3)
HGB BLD-MCNC: 8 GM/DL (ref 10.7–15.3)
LYMPHOCYTES # BLD: 18.2 % (ref 8–40)
LYMPHOCYTES # BLD: 23.5 % (ref 8–40)
MCH RBC QN AUTO: 27.6 PG (ref 25.7–33.7)
MCH RBC QN AUTO: 27.8 PG (ref 25.7–33.7)
MCHC RBC AUTO-ENTMCNC: 31.7 G/DL (ref 32–36)
MCHC RBC AUTO-ENTMCNC: 31.7 G/DL (ref 32–36)
MCV RBC: 87.1 FL (ref 80–96)
MCV RBC: 87.6 FL (ref 80–96)
MONOCYTES # BLD AUTO: 8.6 % (ref 3.8–10.2)
MONOCYTES # BLD AUTO: 9 % (ref 3.8–10.2)
NEUTROPHILS # BLD: 65.9 % (ref 42.8–82.8)
NEUTROPHILS # BLD: 71.1 % (ref 42.8–82.8)
PLATELET # BLD AUTO: 148 K/MM3 (ref 134–434)
PLATELET # BLD AUTO: 157 K/MM3 (ref 134–434)
PMV BLD: 8.6 FL (ref 7.5–11.1)
PMV BLD: 9.9 FL (ref 7.5–11.1)
POTASSIUM SERPLBLD-SCNC: 5.2 MMOL/L (ref 3.5–5.1)
PROT SERPL-MCNC: 6.1 G/DL (ref 6.4–8.2)
RBC # BLD AUTO: 2.81 M/MM3 (ref 3.6–5.2)
RBC # BLD AUTO: 2.88 M/MM3 (ref 3.6–5.2)
SODIUM SERPL-SCNC: 143 MMOL/L (ref 136–145)
WBC # BLD AUTO: 4.8 K/MM3 (ref 4–10)
WBC # BLD AUTO: 5.4 K/MM3 (ref 4–10)

## 2018-02-16 RX ADMIN — INSULIN ASPART SCH: 100 INJECTION, SOLUTION INTRAVENOUS; SUBCUTANEOUS at 06:16

## 2018-02-16 RX ADMIN — ATORVASTATIN CALCIUM SCH MG: 20 TABLET, FILM COATED ORAL at 21:41

## 2018-02-16 RX ADMIN — GABAPENTIN SCH MG: 100 CAPSULE ORAL at 16:11

## 2018-02-16 RX ADMIN — PANTOPRAZOLE SODIUM SCH MG: 40 INJECTION, POWDER, FOR SOLUTION INTRAVENOUS at 10:02

## 2018-02-16 RX ADMIN — AMLODIPINE BESYLATE SCH MG: 2.5 TABLET ORAL at 10:02

## 2018-02-16 RX ADMIN — ALLOPURINOL SCH MG: 100 TABLET ORAL at 10:02

## 2018-02-16 RX ADMIN — POLYETHYLENE GLYCOL 3350 SCH: 17 POWDER, FOR SOLUTION ORAL at 21:31

## 2018-02-16 RX ADMIN — GABAPENTIN SCH MG: 100 CAPSULE ORAL at 06:17

## 2018-02-16 RX ADMIN — GABAPENTIN SCH MG: 100 CAPSULE ORAL at 21:41

## 2018-02-16 RX ADMIN — BUDESONIDE AND FORMOTEROL FUMARATE DIHYDRATE SCH PUFF: 160; 4.5 AEROSOL RESPIRATORY (INHALATION) at 21:41

## 2018-02-16 RX ADMIN — POLYETHYLENE GLYCOL 3350 SCH GM: 17 POWDER, FOR SOLUTION ORAL at 10:02

## 2018-02-16 RX ADMIN — INSULIN ASPART SCH: 100 INJECTION, SOLUTION INTRAVENOUS; SUBCUTANEOUS at 11:27

## 2018-02-16 RX ADMIN — INSULIN ASPART SCH: 100 INJECTION, SOLUTION INTRAVENOUS; SUBCUTANEOUS at 16:51

## 2018-02-16 RX ADMIN — BUDESONIDE AND FORMOTEROL FUMARATE DIHYDRATE SCH PUFF: 160; 4.5 AEROSOL RESPIRATORY (INHALATION) at 10:02

## 2018-02-16 RX ADMIN — VALSARTAN SCH MG: 160 TABLET, FILM COATED ORAL at 10:02

## 2018-02-16 NOTE — PN
Progress Note (short form)





- Note


Progress Note: 


CC: pre-op clearance





 


S: no recurrence of chest heaviness, no sob, palps, dizziness.  small blood in 

bm this AM











 


 Current Medications











Generic Name Dose Route Start Last Admin





  Trade Name Freq  PRN Reason Stop Dose Admin


 


Albuterol Sulfate  1 amp  02/11/18 12:25  02/15/18 21:15





  Ventolin 0.083% Nebulizer Soln -  NEB   1 amp





  Q6H PRN   Administration





  SHORT OF BREATH/WHEEZING   


 


Allopurinol  100 mg  02/08/18 10:00  02/16/18 10:02





  Zyloprim -  PO   100 mg





  DAILY [facility]   Administration


 


Amlodipine Besylate  2.5 mg  02/08/18 10:00  02/16/18 10:02





  Norvasc -  PO   2.5 mg





  DAILY [facility]   Administration


 


Atorvastatin Calcium  20 mg  02/07/18 22:00  02/15/18 22:22





  Lipitor -  PO   20 mg





  HS [facility]   Administration


 


Budesonide/Formoterol Fumarate  2 puff  02/07/18 22:00  02/16/18 10:02





  Symbicort 160/4.5mcg -  IH   2 puff





  BID [facility]   Administration


 


Furosemide  80 mg  02/17/18 06:00  





  Lasix -  PO   





  BIDLASIX E[facility]A   


 


Gabapentin  200 mg  02/07/18 15:15  02/16/18 06:17





  Neurontin -  PO   200 mg





  TID [facility]   Administration


 


Insulin Aspart  1 vial  02/07/18 07:00  02/16/18 11:27





  Novolog Vial Sliding Scale -  SQ   Not Given





  TIDAC [facility]   





  Protocol   


 


Pantoprazole Sodium  40 mg  02/06/18 17:45  02/16/18 10:02





  Protonix Iv  IVPUSH   40 mg





  DAILY [facility]   Administration


 


Polyethylene Glycol  17 gm  02/06/18 22:00  02/16/18 10:02





  Miralax (For Daily Use) -  PO   17 gm





  BID [facility]   Administration


 


Valsartan  320 mg  02/08/18 10:00  02/16/18 10:02





  Diovan -  PO   320 mg





  DAILY [facility]   Administration








 Vital Signs











 Period  Temp  Pulse  Resp  BP Sys/Boothe  Pulse Ox


 


 Last 24 Hr  98.0 F-99.5 F  52-58  18-20  128-150/35-81  99











nad calm


jvd flat, neck supple


ctab, nl effort


rrr nl s1, s2 no mrg


+ bs soft nt nd, obese


trace le edema. no c/c


aaox3


no jaundice, diaphoresis











 


 


 CBC, BMP





 02/16/18 06:10 





 02/16/18 06:10 











  





ekg 2/6: sb 55 bpm.  lad.  lafb.  new lateral twi.  


ekg 2/10: sb 48 bpm. lad. lafb.  slight improvement in lateral twave 

abnormality (now flat/biphasic)


tele: SB 40's-50's, occ pvc's





cxr: no acute pathology





echo 12/2017 :nl lv/rv size/fn.  impaired relaxation.  1+ lae. nl valves.  nl 

ivc.  no rvsp. 





holter 12/2016: avg HR 56 bpm.  no bradyarrhythmias.  no sig pauses.  1% pvc 

burden.  one run of psvt (4 beats).  





stress test 2/2018: No Ekg changes.  No ischemia.  nl perf/EF. 


stress test 2014: no ekg changes, sx's. bline hr 52 bpm.  nl perfusion/EF. 





A/P








88 yo with h/o HTN, venous insufficiency, DM, CKD, COPD, anemia, hyperkalemia, 

hypertension, gout, diverticulosis, who p/w [facility] hospital course c/b 

bradycardia, cp and troponin leak. 





pre-op clearance


- subtle changes on her admit ekg from baseline.  patient with episode of chest 

heaviness 2/10 --> subsequent slight rise and fall pattern in response to 

episode of chest heaviness (episode occurred a few days after hgb drop not 2/2 

demand from anemia). Concern for cardiac etiology to cp.  Patient was 

transferred to tele with plan for stress test   for risk stratification.    


- 2/11 discussed case with GI.  Patient not a candidate for anti-platelets b/c 

of GIB.  Discussed complexity of managing possible cad with patient due to ASA 

allergy and risk of bleeding on anti-platelets.   


- 2/12 awaiting results of stress test - prelim negative per report. 


- 2/13-14: Stress test negative.  No recurrence of chest heaviness.  No further 

testing needed prior to endoscopy.  Based on RCRI, functional status and age 

patient with intermediate estimated risk of mercedes-operative CV events.  Patient 

counseled on risk. In regard to bradycardia --> EKG is sinus alexx with normal 

intervals.  No bradyarrhythmias noted on tele monitoring and Holter.  No 

contraindication to anesthesia.  Would recommend avoidance of anesthesia that 

increases vagal tone when possible and monitor for apnea to avoid worsening 

bradycardia.  


- 2/15: s/p endoscopy, hr/bp controlled. 





troponin elevation,  


- episode of chest heaviness here 2/10 with resulting troponin leak as 

mentioned above.  Similar more severe episode 2 weeks ago.  Additionally 

patient with decreased exercise capacity/progressive gonzalez over the past few 

months, --> concern for angina --> ordered stress test.  No evidence of 

ischemia on stress testing.  As mentioned above, patient not a candidate for 

anti-platelets.  Con't medical therapy for primary prevention with statin.  


- no recurrence of CP 


- transfuse for hgb < 8.0





bradycardia


- has longstanding hx of bradycardia, asx.  Patient had Holter monitor for 

evaluation of bradycardia 12/2016.   Holter placed here as well --> no sig 

bradyarrhythmias.  Tele benign.  No contraindication to anesthesia, see 

discussion above. 


  


htn


- stable on valsartan and low dose norvasc





venous insufficiency


- home lasix has been held here and now with some le edema, now resumed after 

FOC

## 2018-02-16 NOTE — PN
Progress Note (short form)





- Note


Progress Note: 


pt seen/ examined.


 chart reviewed.


Discussed with Dr. Martin regarding egd/ colonoscopy 


No obvious source.


pt / RN reports passed small clots this morning.


pt denies  pain.


 Vital Signs











Temp  99.5 F   02/16/18 05:23


 


Pulse  58 L  02/16/18 05:23


 


Resp  20   02/16/18 05:23


 


BP  130/52   02/16/18 05:23


 


Pulse Ox  99   02/15/18 21:00








 Intake & Output











 02/15/18 02/15/18 02/16/18





 11:59 23:59 11:59


 


Intake Total 100 460 


 


Output Total  150 


 


Balance 100 310 


 


Weight 217 lb 3.2 oz  218 lb 6.4 oz


 


Intake:   


 


    


 


  Oral  460 


 


Output:   


 


  Urine  150 


 


    Void  150 


 


Other:   


 


  Voiding Method Bedside Commode Bedside Commode Bedside Commode


 


  # Unmeasured Voids   


 


    Void 2 1 


 


  Weight Measurement Method Standing Scale  Standing Scale








Active Medications





Albuterol Sulfate (Ventolin 0.083% Nebulizer Soln -)  1 amp NEB Q6H PRN


   PRN Reason: SHORT OF BREATH/WHEEZING


   Last Admin: 02/15/18 21:15 Dose:  1 amp


Allopurinol (Zyloprim -)  [facility]ILY [facility]


   Last Admin: 02/16/18 10:02 Dose:  100 mg


Amlodipine Besylate (Norvasc -)  2.5 mg PO DAILY [facility]


   Last Admin: 02/16/18 10:02 Dose:  2.5 mg


Atorvastatin Calcium (Lipitor -)  20 mg PO HS [facility]


   Last Admin: 02/15/18 22:22 Dose:  20 mg


Budesonide/Formoterol Fumarate (Symbicort 160/4.5mcg -)  2 puff IH BID [facility]


   Last Admin: 02/16/18 10:02 Dose:  2 puff


Furosemide (Lasix -)  80 mg PO BIDLASIX [facility]


Gabapentin (Neurontin -)  200 mg PO TID [facility]


   Last Admin: 02/16/18 06:17 Dose:  200 mg


Insulin Aspart (Novolog Vial Sliding Scale -)  1 vial SQ TIDAC [facility]


   PRN Reason: Protocol


   Last Admin: 02/16/18 11:27 Dose:  Not Given


Pantoprazole Sodium (Protonix Iv)  40 mg IVPUSH DAILY [facility]


   Last Admin: 02/16/18 10:02 Dose:  40 mg


Polyethylene Glycol (Miralax (For Daily Use) -)  17 gm PO BID [facility]


   Last Admin: 02/16/18 10:02 Dose:  17 gm


Valsartan (Diovan -)  320 mg PO DAILY [facility]


   Last Admin: 02/16/18 10:02 Dose:  320 mg





 JOLEEN BMP





 02/16/18 06:10 





 02/16/18 06:10 








Physical Exam.


Constitutional: Yes: No Distress. comfortable. 


Cardiovascular: Yes: Regular Rate and Rhythm


Respiratory: Yes: CTA Bilaterally


Gastrointestinal: Yes: Normal Bowel Sounds, Soft.  No: Tenderness. obese.


Edema: LLE: 1+, RLE: 1+


Neuro- Alert and Awake.





Assessment/Plan





Decrease in h/h


still passed clots .


monitor.


EGD-- Duodenitis, ?fungal patch-- await pathology


Colonoscopy-- ext hemorrhoids, diverticulosis


monitor cbc


will follow.























Problem List





- Problems


(1) Bradycardia


Code(s): R00.1 - BRADYCARDIA, UNSPECIFIED   





(2) Hypoglycemia associated with diabetes


Code(s): E11.649 - TYPE 2 DIABETES MELLITUS WITH HYPOGLYCEMIA WITHOUT COMA   





(3) Diverticulosis


Code(s): K57.90 - DVRTCLOS OF INTEST, PART UNSP, W/O PERF OR ABSCESS W/O BLEED 

  


Qualifiers: 


   Diverticulosis site: diverticulosis of large intestine   Diverticulosis 

bleeding: diverticulosis with bleeding   Qualified Code(s): K57.31 - 

Diverticulosis of large intestine without perforation or abscess with bleeding 

  





(4) Lower GI bleeding


Code(s): K92.2 - GASTROINTESTINAL HEMORRHAGE, UNSPECIFIED   





(5) Acute on chronic renal failure


Code(s): N17.9 - ACUTE KIDNEY FAILURE, UNSPECIFIED; N18.9 - CHRONIC KIDNEY 

DISEASE, UNSPECIFIED

## 2018-02-16 NOTE — PATH
Surgical Pathology Report



Patient Name:  LING CORRALES

Accession #:  

[facility]. Rec. #:  [id number]                                                        

   /Age/Gender:  1930 (Age: 87) / F

Account:  [id number]                                                          

             Location: [facility] [address] [facility]LE[facility]

Taken:  2/15/2018

Received:  2/15/2018

Reported:  2018

Physicians:  SHIRLEY Walton M.D.

  



Specimen(s) Received

 BX PLAQUE IN DISTAL ESOPHAGUS 





Clinical History

Preoperative diagnosis: GI hemorrhage

Postoperative diagnosis: Esophagitis, external hemorrhage, diverticulosis, colon

anastomosis







Final Diagnosis

DISTAL ESOPHAGUS, PLAQUE, BIOPSY:

SQUAMOUS MUCOSA WITH MARKED ACUTE ESOPHAGITIS. FUNGAL FORMS MORPHOLOGICALLY

CONSISTENT WITH MACARIO SPECIES ARE HIGHLIGHTED BY PAS SPECIAL STAIN.





***Electronically Signed***

Claudia Altamirano M.D.





Gross Description

Received in formalin, labeled "biopsy plaque in distal esophagus" are 3 tan,

irregular portions of soft tissue averaging 0.2 cm. in greatest dimension. The

specimens are submitted in toto in one cassette.

/2/15/2018



saudi/2/15/2018

## 2018-02-16 NOTE — PN
GI Progress Note


Subjective: 





GI NOte: Two episodes of hematochezia today. No bleeding seen at colonoscopy 

yesterday. CBC are fluctuating. Denies pain. Spoke with Dr Shea who did see 

external hemorrhoids which appears to be the most likely source. Given her 

recent troponin rise will transfuse 1 unit PRBCs





- Objective


Vital Signs: 


 Vital Signs











Temperature  97.9 F   02/16/18 14:56


 


Pulse Rate  53 L  02/16/18 14:56


 


Respiratory Rate  18   02/16/18 14:56


 


Blood Pressure  148/65   02/16/18 14:56


 


O2 Sat by Pulse Oximetry (%)  98   02/16/18 10:00








 Laboratory Tests











  02/11/18 02/12/18 02/14/18





  10:00 06:40 06:25


 


Hgb  9.0 L  8.4 L  7.9 L














  02/15/18 02/16/18 02/16/18





  06:15 06:10 15:40


 


Hgb  8.6 L  7.8 L  8.0 L











Constitutional: Calm


...Auscultate: Yes: Normoactive Bowel Sounds


...Palpate: Yes: Soft, Other (nontender)


Labs: 


 CBC, BMP





 02/16/18 15:40 





 02/16/18 06:10 





 INR, PTT











INR  1.08  (0.82-1.09)   02/06/18  12:31    














Problem List





- Problems


(1) Lower GI bleeding


Assessment/Plan: 


 Continue to suspect recurring hemorrhoidal bleeding which now appears to be 

external hemorrhoidal in origin. Will transfuse 1 unit PRBCs. Dr Espinoza will 

be covering this weekend.Please call hm as needed 


Code(s): K92.2 - GASTROINTESTINAL HEMORRHAGE, UNSPECIFIED   





(2) Diverticulosis


Code(s): K57.90 - DVRTCLOS OF INTEST, PART UNSP, W/O PERF OR ABSCESS W/O BLEED 

  


Qualifiers: 


   Diverticulosis site: diverticulosis of large intestine   Diverticulosis 

bleeding: diverticulosis with bleeding   Qualified Code(s): K57.31 - 

Diverticulosis of large intestine without perforation or abscess with bleeding 

  





(3) Anemia


Code(s): D64.9 - ANEMIA, UNSPECIFIED   





(4) Colon polyp


Code(s): K63.5 - POLYP OF COLON   





(5) Constipation


Code(s): K59.00 - CONSTIPATION, UNSPECIFIED   


Qualifiers: 


   Constipation type: slow transit constipation   Qualified Code(s): K59.01 - 

Slow transit constipation

## 2018-02-17 LAB
ALBUMIN SERPL-MCNC: 2.9 G/DL (ref 3.4–5)
ALP SERPL-CCNC: 95 U/L (ref 45–117)
ALT SERPL-CCNC: 13 U/L (ref 12–78)
ANION GAP SERPL CALC-SCNC: 6 MMOL/L (ref 8–16)
AST SERPL-CCNC: 15 U/L (ref 15–37)
BASOPHILS # BLD: 0.6 % (ref 0–2)
BILIRUB SERPL-MCNC: 0.7 MG/DL (ref 0.2–1)
BUN SERPL-MCNC: 27 MG/DL (ref 7–18)
CALCIUM SERPL-MCNC: 8 MG/DL (ref 8.5–10.1)
CHLORIDE SERPL-SCNC: 110 MMOL/L (ref 98–107)
CO2 SERPL-SCNC: 27 MMOL/L (ref 21–32)
CREAT SERPL-MCNC: 1.8 MG/DL (ref 0.55–1.02)
DEPRECATED RDW RBC AUTO: 16.8 % (ref 11.6–15.6)
EOSINOPHIL # BLD: 1.3 % (ref 0–4.5)
GLUCOSE SERPL-MCNC: 91 MG/DL (ref 74–106)
HCT VFR BLD CALC: 26.5 % (ref 32.4–45.2)
HGB BLD-MCNC: 8.5 GM/DL (ref 10.7–15.3)
LYMPHOCYTES # BLD: 17.8 % (ref 8–40)
MCH RBC QN AUTO: 27.9 PG (ref 25.7–33.7)
MCHC RBC AUTO-ENTMCNC: 32.2 G/DL (ref 32–36)
MCV RBC: 86.6 FL (ref 80–96)
MONOCYTES # BLD AUTO: 10.4 % (ref 3.8–10.2)
NEUTROPHILS # BLD: 69.9 % (ref 42.8–82.8)
PLATELET # BLD AUTO: 127 K/MM3 (ref 134–434)
PMV BLD: 8.3 FL (ref 7.5–11.1)
POTASSIUM SERPLBLD-SCNC: 4.5 MMOL/L (ref 3.5–5.1)
PROT SERPL-MCNC: 5.9 G/DL (ref 6.4–8.2)
RBC # BLD AUTO: 3.06 M/MM3 (ref 3.6–5.2)
SODIUM SERPL-SCNC: 143 MMOL/L (ref 136–145)
WBC # BLD AUTO: 5.5 K/MM3 (ref 4–10)

## 2018-02-17 RX ADMIN — AMLODIPINE BESYLATE SCH: 2.5 TABLET ORAL at 10:00

## 2018-02-17 RX ADMIN — POLYETHYLENE GLYCOL 3350 SCH: 17 POWDER, FOR SOLUTION ORAL at 09:28

## 2018-02-17 RX ADMIN — INSULIN ASPART SCH UNITS: 100 INJECTION, SOLUTION INTRAVENOUS; SUBCUTANEOUS at 12:46

## 2018-02-17 RX ADMIN — VALSARTAN SCH: 160 TABLET, FILM COATED ORAL at 10:00

## 2018-02-17 RX ADMIN — FUROSEMIDE SCH MG: 40 TABLET ORAL at 06:47

## 2018-02-17 RX ADMIN — INSULIN ASPART SCH: 100 INJECTION, SOLUTION INTRAVENOUS; SUBCUTANEOUS at 06:47

## 2018-02-17 RX ADMIN — GABAPENTIN SCH MG: 100 CAPSULE ORAL at 06:47

## 2018-02-17 RX ADMIN — INSULIN ASPART SCH: 100 INJECTION, SOLUTION INTRAVENOUS; SUBCUTANEOUS at 17:22

## 2018-02-17 RX ADMIN — ALLOPURINOL SCH MG: 100 TABLET ORAL at 09:21

## 2018-02-17 RX ADMIN — ATORVASTATIN CALCIUM SCH MG: 20 TABLET, FILM COATED ORAL at 21:57

## 2018-02-17 RX ADMIN — GABAPENTIN SCH MG: 100 CAPSULE ORAL at 21:57

## 2018-02-17 RX ADMIN — BUDESONIDE AND FORMOTEROL FUMARATE DIHYDRATE SCH PUFF: 160; 4.5 AEROSOL RESPIRATORY (INHALATION) at 09:21

## 2018-02-17 RX ADMIN — BUDESONIDE AND FORMOTEROL FUMARATE DIHYDRATE SCH PUFF: 160; 4.5 AEROSOL RESPIRATORY (INHALATION) at 21:58

## 2018-02-17 RX ADMIN — POLYETHYLENE GLYCOL 3350 SCH: 17 POWDER, FOR SOLUTION ORAL at 22:01

## 2018-02-17 RX ADMIN — FUROSEMIDE SCH MG: 40 TABLET ORAL at 15:04

## 2018-02-17 RX ADMIN — GABAPENTIN SCH MG: 100 CAPSULE ORAL at 15:04

## 2018-02-17 RX ADMIN — PANTOPRAZOLE SODIUM SCH MG: 40 INJECTION, POWDER, FOR SOLUTION INTRAVENOUS at 09:21

## 2018-02-17 NOTE — PN
Progress Note (short form)





- Note


Progress Note: 


pt seen/ examined


continue to have  episodes of bleeding


got one unit of prbcs last night


 chart reviewed.


denies pain.


 


 Vital Signs











Temp  98 F   02/17/18 10:00


 


Pulse  66   02/17/18 10:00


 


Resp  22   02/17/18 10:00


 


BP  100/65   02/17/18 10:00


 


Pulse Ox  99   02/17/18 09:00








 Intake & Output











 02/16/18 02/17/18 02/17/18





 23:59 11:59 23:59


 


Intake Total  400 


 


Balance  400 


 


Weight  216 lb 8 oz 


 


Intake:   


 


  IV  50 


 


    SALINE LOCK  50 


 


  Packed Cells  350 


 


Other:   


 


  Voiding Method Bedside Commode Bedside Commode 


 


  # Unmeasured Voids   


 


    Void  3 


 


  Bowel Movement  Yes: bloody 


 


  # Bowel Movements  1 


 


  Weight Measurement Method  Standing Scale 











Active Medications





Allopurinol (Zyloprim -)  100 mg PO DAILY [facility]


   Last Admin: 02/17/18 09:21 Dose:  100 mg


Amlodipine Besylate (Norvasc -)  2.5 mg PO DAILY [facility]


   Last Admin: 02/16/18 10:02 Dose:  2.5 mg


Atorvastatin Calcium (Lipitor -)  20 mg PO HS [facility]


   Last Admin: 02/16/18 21:41 Dose:  20 mg


Budesonide/Formoterol Fumarate (Symbicort 160/4.5mcg -)  2 puff IH BID [facility]


   Last Admin: 02/17/18 09:21 Dose:  2 puff


Furosemide (Lasix -)  80 mg PO BIDLASIX [facility]


   Last Admin: 02/17/18 06:47 Dose:  80 mg


Gabapentin (Neurontin -)  200 mg PO TID [facility]


   Last Admin: 02/17/18 06:47 Dose:  200 mg


Insulin Aspart (Novolog Vial Sliding Scale -)  1 vial SQ TIDAC [facility]


   PRN Reason: Protocol


   Last Admin: 02/17/18 12:46 Dose:  2 units


Pantoprazole Sodium (Protonix Iv)  40 mg IVPUSH DAILY [facility]


   Last Admin: 02/17/18 09:21 Dose:  40 mg


Polyethylene Glycol (Miralax (For Daily Use) -)  17 gm PO BID [facility]


   Last Admin: 02/17/18 09:28 Dose:  Not Given


Valsartan (Diovan -)  320 mg PO DAILY [facility]


   Last Admin: 02/16/18 10:02 Dose:  320 mg





 CBC, BMP





 02/17/18 06:15 





 02/17/18 06:15 





Physical Exam.


Constitutional: Yes: No Distress. comfortable. obese.


Cardiovascular: Yes: Regular Rate and Rhythm


Respiratory: Yes: CTA Bilaterally


Gastrointestinal: Yes: Normal Bowel Sounds, Soft.  No: Tenderness. obese.


Edema: trace edema


Neuro- Alert and Awake.





Assessment/Plan





gi bleed


still passed clots .


monitor.


EGD-- Duodenitis, ?fungal patch-- await pathology


Colonoscopy-- ext hemorrhoids, diverticulosis


transfuse prn


will discuss with Dr. Ramires


capsule endoscopy ?


bleeding scan ?


will follow.


























Problem List





- Problems


(1) Bradycardia


Code(s): R00.1 - BRADYCARDIA, UNSPECIFIED   





(2) Hypoglycemia associated with diabetes


Code(s): E11.649 - TYPE 2 DIABETES MELLITUS WITH HYPOGLYCEMIA WITHOUT COMA   





(3) Diverticulosis


Code(s): K57.90 - DVRTCLOS OF INTEST, PART UNSP, W/O PERF OR ABSCESS W/O BLEED 

  


Qualifiers: 


   Diverticulosis site: diverticulosis of large intestine   Diverticulosis 

bleeding: diverticulosis with bleeding   Qualified Code(s): K57.31 - 

Diverticulosis of large intestine without perforation or abscess with bleeding 

  





(4) Lower GI bleeding


Code(s): K92.2 - GASTROINTESTINAL HEMORRHAGE, UNSPECIFIED   





(5) Acute on chronic renal failure


Code(s): N17.9 - ACUTE KIDNEY FAILURE, UNSPECIFIED; N18.9 - CHRONIC KIDNEY 

DISEASE, UNSPECIFIED

## 2018-02-17 NOTE — PN
Progress Note (short form)





- Note


Progress Note: 


CC: pre-op clearance





 


S: no recurrence of chest heaviness, no sob, palps, dizziness.  blood in bm 

this AM











 


 


 Current Medications











Generic Name Dose Route Start Last Admin





  Trade Name Samir  PRN Reason Stop Dose Admin


 


Allopurinol  100 mg  02/08/18 10:00  02/17/18 09:21





  Zyloprim -  PO   100 mg





  DAILY ELLA   Administration


 


Amlodipine Besylate  2.5 mg  02/08/18 10:00  02/16/18 10:02





  Norvasc -  PO   2.5 mg





  DAILY ELLA   Administration


 


Atorvastatin Calcium  20 mg  02/07/18 22:00  02/16/18 21:41





  Lipitor -  PO   20 mg





  HS ELLA   Administration


 


Budesonide/Formoterol Fumarate  2 puff  02/07/18 22:00  02/17/18 09:21





  Symbicort 160/4.5mcg -  IH   2 puff





  BID ELLA   Administration


 


Furosemide  80 mg  02/17/18 06:00  02/17/18 06:47





  Lasix -  PO   80 mg





  BIDLASIX ELLA   Administration


 


Gabapentin  200 mg  02/07/18 15:15  02/17/18 06:47





  Neurontin -  PO   200 mg





  TID ELLA   Administration


 


Insulin Aspart  1 vial  02/07/18 07:00  02/17/18 06:47





  Novolog Vial Sliding Scale -  SQ   Not Given





  TIDAC [facility]   





  Protocol   


 


Pantoprazole Sodium  40 mg  02/06/18 17:45  02/17/18 09:21





  Protonix Iv  IVPUSH   40 mg





  DAILY ELLA   Administration


 


Polyethylene Glycol  17 gm  02/06/18 22:00  02/17/18 09:28





  Miralax (For Daily Use) -  PO   Not Given





  BID ELLA   


 


Valsartan  320 mg  02/08/18 10:00  02/16/18 10:02





  Diovan -  PO   320 mg





  DAILY ELLA   Administration








 Vital Signs











 Period  Temp  Pulse  Resp  BP Sys/Boothe  Pulse Ox


 


 Last 24 Hr  97.9 F-99.1 F  52-59  18-20  129-165/52-71  100








nad calm


jvd flat, neck supple


ctab, nl effort


rrr nl s1, s2 no mrg


+ bs soft nt nd, obese


trace le edema. no c/c


aaox3


no jaundice, diaphoresis








 CBC, BMP





 02/17/18 06:15 





 02/17/18 06:15 








 


ekg 2/6: sb 55 bpm.  lad.  lafb.  new lateral twi.  


ekg 2/10: sb 48 bpm. lad. lafb.  slight improvement in lateral twave 

abnormality (now flat/biphasic)


tele: SB 40's-50's, occ pvc's





cxr: no acute pathology





echo 12/2017 :nl lv/rv size/fn.  impaired relaxation.  1+ lae. nl valves.  nl 

ivc.  no rvsp. 





holter 12/2016: avg HR 56 bpm.  no bradyarrhythmias.  no sig pauses.  1% pvc 

burden.  one run of psvt (4 beats).  





stress test 2/2018: No Ekg changes.  No ischemia.  nl perf/EF. 


stress test 2014: no ekg changes, sx's. bline hr 52 bpm.  nl perfusion/EF. 








A/P


88 yo with h/o HTN, venous insufficiency, DM, CKD, COPD, anemia, hyperkalemia, 

hypertension, gout, diverticulosis, who p/w [facility] hospital course c/b 

bradycardia, cp and troponin leak. 





pre-op clearance


- subtle changes on her admit ekg from baseline.  patient with episode of chest 

heaviness 2/10 --> subsequent slight rise and fall pattern in response to 

episode of chest heaviness (episode occurred a few days after hgb drop not 2/2 

demand from anemia). Concern for cardiac etiology to cp.  Patient was 

transferred to tele with plan for stress test   for risk stratification.    


- 2/11 discussed case with GI.  Patient not a candidate for anti-platelets b/c 

of GIB.  Discussed complexity of managing possible cad with patient due to ASA 

allergy and risk of bleeding on anti-platelets.   


- 2/12 awaiting results of stress test - prelim negative per report. 


- 2/13-14: Stress test negative.  No recurrence of chest heaviness.  No further 

testing needed prior to endoscopy.  Based on RCRI, functional status and age 

patient with intermediate estimated risk of mercedes-operative CV events.  Patient 

counseled on risk. In regard to bradycardia --> EKG is sinus alexx with normal 

intervals.  No bradyarrhythmias noted on tele monitoring and Holter.  No 

contraindication to anesthesia.  Would recommend avoidance of anesthesia that 

increases vagal tone when possible and monitor for apnea to avoid worsening 

bradycardia.  


- 2/15-: s/p endoscopy, hr/bp controlled. 





troponin elevation,  


- episode of chest heaviness here 2/10 with resulting troponin leak as 

mentioned above.  Similar more severe episode 2 weeks ago.  Additionally 

patient with decreased exercise capacity/progressive gonzalez over the past few 

months, --> concern for angina --> ordered stress test.  No evidence of 

ischemia on stress testing.  As mentioned above, patient not a candidate for 

anti-platelets.  Con't medical therapy for primary prevention with statin.  


- no recurrence of CP 


- transfuse for hgb < 8.0





bradycardia


- has longstanding hx of bradycardia, asx.  Patient had Holter monitor for 

evaluation of bradycardia 12/2016.   Holter placed here as well --> no sig 

bradyarrhythmias.  Tele benign.  No contraindication to anesthesia, see 

discussion above. 


  


htn


- stable on valsartan and low dose norvasc





venous insufficiency


- home lasix has been held here and now with some le edema, now resumed after 

FOC





gib:


-still with bloody bm's


-got prbcs yesterday


-per gi

## 2018-02-18 LAB
ALBUMIN SERPL-MCNC: 3 G/DL (ref 3.4–5)
ALP SERPL-CCNC: 104 U/L (ref 45–117)
ALT SERPL-CCNC: 14 U/L (ref 12–78)
ANION GAP SERPL CALC-SCNC: 8 MMOL/L (ref 8–16)
AST SERPL-CCNC: 17 U/L (ref 15–37)
BASOPHILS # BLD: 0.9 % (ref 0–2)
BILIRUB SERPL-MCNC: 0.5 MG/DL (ref 0.2–1)
BUN SERPL-MCNC: 30 MG/DL (ref 7–18)
CALCIUM SERPL-MCNC: 9.1 MG/DL (ref 8.5–10.1)
CHLORIDE SERPL-SCNC: 108 MMOL/L (ref 98–107)
CO2 SERPL-SCNC: 27 MMOL/L (ref 21–32)
CREAT SERPL-MCNC: 1.9 MG/DL (ref 0.55–1.02)
DEPRECATED RDW RBC AUTO: 17 % (ref 11.6–15.6)
EOSINOPHIL # BLD: 1.6 % (ref 0–4.5)
GLUCOSE SERPL-MCNC: 82 MG/DL (ref 74–106)
HCT VFR BLD CALC: 29.1 % (ref 32.4–45.2)
HGB BLD-MCNC: 9.2 GM/DL (ref 10.7–15.3)
LYMPHOCYTES # BLD: 21.3 % (ref 8–40)
MCH RBC QN AUTO: 27.3 PG (ref 25.7–33.7)
MCHC RBC AUTO-ENTMCNC: 31.8 G/DL (ref 32–36)
MCV RBC: 85.9 FL (ref 80–96)
MONOCYTES # BLD AUTO: 9.4 % (ref 3.8–10.2)
NEUTROPHILS # BLD: 66.8 % (ref 42.8–82.8)
PLATELET # BLD AUTO: 151 K/MM3 (ref 134–434)
PMV BLD: 9.6 FL (ref 7.5–11.1)
POTASSIUM SERPLBLD-SCNC: 4.7 MMOL/L (ref 3.5–5.1)
PROT SERPL-MCNC: 6.9 G/DL (ref 6.4–8.2)
RBC # BLD AUTO: 3.38 M/MM3 (ref 3.6–5.2)
SODIUM SERPL-SCNC: 143 MMOL/L (ref 136–145)
WBC # BLD AUTO: 5.3 K/MM3 (ref 4–10)

## 2018-02-18 RX ADMIN — AMLODIPINE BESYLATE SCH MG: 2.5 TABLET ORAL at 09:20

## 2018-02-18 RX ADMIN — INSULIN ASPART SCH: 100 INJECTION, SOLUTION INTRAVENOUS; SUBCUTANEOUS at 12:02

## 2018-02-18 RX ADMIN — PANTOPRAZOLE SODIUM SCH MG: 40 INJECTION, POWDER, FOR SOLUTION INTRAVENOUS at 09:20

## 2018-02-18 RX ADMIN — VALSARTAN SCH MG: 160 TABLET, FILM COATED ORAL at 09:20

## 2018-02-18 RX ADMIN — GABAPENTIN SCH MG: 100 CAPSULE ORAL at 06:38

## 2018-02-18 RX ADMIN — FUROSEMIDE SCH MG: 40 TABLET ORAL at 14:33

## 2018-02-18 RX ADMIN — INSULIN ASPART SCH: 100 INJECTION, SOLUTION INTRAVENOUS; SUBCUTANEOUS at 06:39

## 2018-02-18 RX ADMIN — ALLOPURINOL SCH MG: 100 TABLET ORAL at 09:20

## 2018-02-18 RX ADMIN — GABAPENTIN SCH MG: 100 CAPSULE ORAL at 21:33

## 2018-02-18 RX ADMIN — POLYETHYLENE GLYCOL 3350 SCH GM: 17 POWDER, FOR SOLUTION ORAL at 09:20

## 2018-02-18 RX ADMIN — FUROSEMIDE SCH MG: 40 TABLET ORAL at 06:38

## 2018-02-18 RX ADMIN — INSULIN ASPART SCH UNITS: 100 INJECTION, SOLUTION INTRAVENOUS; SUBCUTANEOUS at 17:31

## 2018-02-18 RX ADMIN — GABAPENTIN SCH MG: 100 CAPSULE ORAL at 14:33

## 2018-02-18 RX ADMIN — POLYETHYLENE GLYCOL 3350 SCH: 17 POWDER, FOR SOLUTION ORAL at 21:34

## 2018-02-18 RX ADMIN — BUDESONIDE AND FORMOTEROL FUMARATE DIHYDRATE SCH PUFF: 160; 4.5 AEROSOL RESPIRATORY (INHALATION) at 09:20

## 2018-02-18 RX ADMIN — ATORVASTATIN CALCIUM SCH MG: 20 TABLET, FILM COATED ORAL at 21:34

## 2018-02-18 RX ADMIN — BUDESONIDE AND FORMOTEROL FUMARATE DIHYDRATE SCH PUFF: 160; 4.5 AEROSOL RESPIRATORY (INHALATION) at 21:33

## 2018-02-18 NOTE — PN
Progress Note (short form)





- Note


Progress Note: 


CC: pre-op clearance





 


S: no recurrence of chest heaviness, no sob, palps, dizziness.  











 


 


 Current Medications











Generic Name Dose Route Start Last Admin





  Trade Name Samir  PRN Reason Stop Dose Admin


 


Allopurinol  100 mg  02/08/18 10:00  02/18/18 09:20





  Zyloprim -  PO   100 mg





  DAILY ELLA   Administration


 


Amlodipine Besylate  2.5 mg  02/08/18 10:00  02/18/18 09:20





  Norvasc -  PO   2.5 mg





  DAILY LELA   Administration


 


Atorvastatin Calcium  20 mg  02/07/18 22:00  02/17/18 21:57





  Lipitor -  PO   20 mg





  HS ELLA   Administration


 


Budesonide/Formoterol Fumarate  2 puff  02/07/18 22:00  02/18/18 09:20





  Symbicort 160/4.5mcg -  IH   2 puff





  BID ELLA   Administration


 


Furosemide  80 mg  02/17/18 06:00  02/18/18 06:38





  Lasix -  PO   80 mg





  BIDLASIX ELLA   Administration


 


Gabapentin  200 mg  02/07/18 15:15  02/18/18 06:38





  Neurontin -  PO   200 mg





  TID ELLA   Administration


 


Insulin Aspart  1 vial  02/07/18 07:00  02/18/18 06:39





  Novolog Vial Sliding Scale -  SQ   Not Given





  TIDAC [facility]   





  Protocol   


 


Pantoprazole Sodium  40 mg  02/06/18 17:45  02/18/18 09:20





  Protonix Iv  IVPUSH   40 mg





  DAILY ELLA   Administration


 


Polyethylene Glycol  17 gm  02/06/18 22:00  02/18/18 09:20





  Miralax (For Daily Use) -  PO   17 gm





  BID ELLA   Administration


 


Valsartan  320 mg  02/08/18 10:00  02/18/18 09:20





  Diovan -  PO   320 mg





  DAILY ELLA   Administration








 Vital Signs











 Period  Temp  Pulse  Resp  BP Sys/Boothe  Pulse Ox


 


 Last 24 Hr  98.2 F-99.3 F  54-75  18-22  129-139/44-60  100











nad calm


jvd flat, neck supple


ctab, nl effort


rrr nl s1, s2 no mrg


+ bs soft nt nd, obese


trace le edema. no c/c


aaox3


no jaundice, diaphoresis








 


 CBC, BMP





 02/18/18 06:37 





 02/18/18 06:37 











 


ekg 2/6: sb 55 bpm.  lad.  lafb.  new lateral twi.  


ekg 2/10: sb 48 bpm. lad. lafb.  slight improvement in lateral twave 

abnormality (now flat/biphasic)


tele: SB 40's-50's, occ pvc's





cxr: no acute pathology





echo 12/2017 :nl lv/rv size/fn.  impaired relaxation.  1+ lae. nl valves.  nl 

ivc.  no rvsp. 





holter 12/2016: avg HR 56 bpm.  no bradyarrhythmias.  no sig pauses.  1% pvc 

burden.  one run of psvt (4 beats).  





stress test 2/2018: No Ekg changes.  No ischemia.  nl perf/EF. 


stress test 2014: no ekg changes, sx's. bline hr 52 bpm.  nl perfusion/EF. 








A/P


86 yo with h/o HTN, venous insufficiency, DM, CKD, COPD, anemia, hyperkalemia, 

hypertension, gout, diverticulosis, who p/w [facility] hospital course c/b 

bradycardia, cp and troponin leak. 





pre-op clearance


- subtle changes on her admit ekg from baseline.  patient with episode of chest 

heaviness 2/10 --> subsequent slight rise and fall pattern in response to 

episode of chest heaviness (episode occurred a few days after hgb drop not 2/2 

demand from anemia). Concern for cardiac etiology to cp.  Patient was 

transferred to tele with plan for stress test   for risk stratification.    


- 2/11 discussed case with GI.  Patient not a candidate for anti-platelets b/c 

of GIB.  Discussed complexity of managing possible cad with patient due to ASA 

allergy and risk of bleeding on anti-platelets.   


- 2/12 awaiting results of stress test - prelim negative per report. 


- 2/13-14: Stress test negative.  No recurrence of chest heaviness.  No further 

testing needed prior to endoscopy.  Based on RCRI, functional status and age 

patient with intermediate estimated risk of mercedes-operative CV events.  Patient 

counseled on risk. In regard to bradycardia --> EKG is sinus alexx with normal 

intervals.  No bradyarrhythmias noted on tele monitoring and Holter.  No 

contraindication to anesthesia.  Would recommend avoidance of anesthesia that 

increases vagal tone when possible and monitor for apnea to avoid worsening 

bradycardia.  


- 2/15-: s/p endoscopy, hr/bp controlled. 





troponin elevation,  


- episode of chest heaviness here 2/10 with resulting troponin leak as 

mentioned above.  Similar more severe episode 2 weeks ago.  Additionally 

patient with decreased exercise capacity/progressive gonzalez over the past few 

months, --> concern for angina --> ordered stress test.  No evidence of 

ischemia on stress testing.  As mentioned above, patient not a candidate for 

anti-platelets.  Con't medical therapy for primary prevention with statin.  


- no recurrence of CP 





bradycardia


- has longstanding hx of bradycardia, asx.  Patient had Holter monitor for 

evaluation of bradycardia 12/2016.   Holter placed here as well --> no sig 

bradyarrhythmias.  Tele benign.  No contraindication to anesthesia, see 

discussion above. 


  


htn


- stable on valsartan and low dose norvasc





venous insufficiency


- cont po lasix 80 bid





gib:


-still with bloody bm's


-per gi

## 2018-02-18 NOTE — PN
Progress Note (short form)





- Note


Progress Note: 


pt seen/ examined


no bleeding overnight


comfortable


denies pain.


 


 


 Vital Signs











Temp  98.8 F   02/18/18 07:18


 


Pulse  57 L  02/18/18 07:18


 


Resp  20   02/18/18 07:18


 


BP  131/52   02/18/18 07:18


 


Pulse Ox  100   02/17/18 21:00








 Intake & Output











 02/17/18 02/17/18 02/18/18





 11:59 23:59 11:59


 


Intake Total 400 360 200


 


Balance 400 360 200


 


Weight 216 lb 8 oz  206 lb 9 oz


 


Intake:   


 


  IV 50 0 0


 


    SALINE LOCK 50  


 


    sl  0 0


 


  Oral  360 200


 


  Packed Cells 350  


 


Other:   


 


  Voiding Method Bedside Commode Bedside Commode Bedside Commode


 


  # Unmeasured Voids   


 


    Void 3 4 3


 


  Bowel Movement Yes: bloody Yes Yes


 


  # Bowel Movements 3 3 2


 


  Weight Measurement Method Standing Scale  Standing Scale














Active Medications





Allopurinol (Zyloprim -)  100 mg PO DAILY [facility]


   Last Admin: 02/18/18 09:20 Dose:  100 mg


Amlodipine Besylate (Norvasc -)  2.5 mg PO DAILY [facility]


   Last Admin: 02/18/18 09:20 Dose:  2.5 mg


Atorvastatin Calcium (Lipitor -)  20 mg PO HS [facility]


   Last Admin: 02/17/18 21:57 Dose:  20 mg


Budesonide/Formoterol Fumarate (Symbicort 160/4.5mcg -)  2 puff IH BID [facility]


   Last Admin: 02/18/18 09:20 Dose:  2 puff


Furosemide (Lasix -)  80 mg PO BIDLASIX [facility]


   Last Admin: 02/18/18 06:38 Dose:  80 mg


Gabapentin (Neurontin -)  200 mg PO TID [facility]


   Last Admin: 02/18/18 06:38 Dose:  200 mg


Insulin Aspart (Novolog Vial Sliding Scale -)  1 vial SQ TIDAC [facility]


   PRN Reason: Protocol


   Last Admin: 02/18/18 06:39 Dose:  Not Given


Pantoprazole Sodium (Protonix Iv)  40 mg IVPUSH DAILY [facility]


   Last Admin: 02/18/18 09:20 Dose:  40 mg


Polyethylene Glycol (Miralax (For Daily Use) -)  17 gm PO BID [facility]


   Last Admin: 02/18/18 09:20 Dose:  17 gm


Valsartan (Diovan -)  320 mg PO DAILY [facility]


   Last Admin: 02/18/18 09:20 Dose:  320 mg








 


 CBC, BMP





 02/18/18 06:37 





 02/18/18 06:37 


Physical Exam.


Constitutional: Yes: No Distress. comfortable. obese.


Cardiovascular: Yes: Regular Rate and Rhythm


Respiratory: Yes: CTA Bilaterally


Gastrointestinal: Yes: Normal Bowel Sounds, Soft.  No: Tenderness. obese.


Edema: trace edema


Neuro- Alert and Awake.





Assessment/Plan





gi bleed


h/h stable


monitor.


EGD-- Duodenitis, ?fungal patch-- await pathology


Colonoscopy-- ext hemorrhoids, diverticulosis


transfuse prn


pt want to stay today - to make sure no further bleed


cbc in am


d/c tele


if stable- anticipate d/c in am.





























Problem List





- Problems


(1) Bradycardia


Code(s): R00.1 - BRADYCARDIA, UNSPECIFIED   





(2) Hypoglycemia associated with diabetes


Code(s): E11.649 - TYPE 2 DIABETES MELLITUS WITH HYPOGLYCEMIA WITHOUT COMA   





(3) Diverticulosis


Code(s): K57.90 - DVRTCLOS OF INTEST, PART UNSP, W/O PERF OR ABSCESS W/O BLEED 

  


Qualifiers: 


   Diverticulosis site: diverticulosis of large intestine   Diverticulosis 

bleeding: diverticulosis with bleeding   Qualified Code(s): K57.31 - 

Diverticulosis of large intestine without perforation or abscess with bleeding 

  





(4) Lower GI bleeding


Code(s): K92.2 - GASTROINTESTINAL HEMORRHAGE, UNSPECIFIED   





(5) Acute on chronic renal failure


Code(s): N17.9 - ACUTE KIDNEY FAILURE, UNSPECIFIED; N18.9 - CHRONIC KIDNEY 

DISEASE, UNSPECIFIED

## 2018-02-19 VITALS — DIASTOLIC BLOOD PRESSURE: 55 MMHG | HEART RATE: 48 BPM | SYSTOLIC BLOOD PRESSURE: 134 MMHG | TEMPERATURE: 98.8 F

## 2018-02-19 LAB
BASOPHILS # BLD: 0.8 % (ref 0–2)
DEPRECATED RDW RBC AUTO: 16.8 % (ref 11.6–15.6)
EOSINOPHIL # BLD: 1.6 % (ref 0–4.5)
HCT VFR BLD CALC: 25.6 % (ref 32.4–45.2)
HGB BLD-MCNC: 8.4 GM/DL (ref 10.7–15.3)
LYMPHOCYTES # BLD: 23.7 % (ref 8–40)
MCH RBC QN AUTO: 28.3 PG (ref 25.7–33.7)
MCHC RBC AUTO-ENTMCNC: 32.7 G/DL (ref 32–36)
MCV RBC: 86.4 FL (ref 80–96)
MONOCYTES # BLD AUTO: 9.2 % (ref 3.8–10.2)
NEUTROPHILS # BLD: 64.7 % (ref 42.8–82.8)
PLATELET # BLD AUTO: 142 K/MM3 (ref 134–434)
PMV BLD: 9.8 FL (ref 7.5–11.1)
RBC # BLD AUTO: 2.97 M/MM3 (ref 3.6–5.2)
WBC # BLD AUTO: 5.6 K/MM3 (ref 4–10)

## 2018-02-19 RX ADMIN — INSULIN ASPART SCH: 100 INJECTION, SOLUTION INTRAVENOUS; SUBCUTANEOUS at 12:12

## 2018-02-19 RX ADMIN — AMLODIPINE BESYLATE SCH MG: 2.5 TABLET ORAL at 09:39

## 2018-02-19 RX ADMIN — FUROSEMIDE SCH MG: 40 TABLET ORAL at 13:52

## 2018-02-19 RX ADMIN — BUDESONIDE AND FORMOTEROL FUMARATE DIHYDRATE SCH PUFF: 160; 4.5 AEROSOL RESPIRATORY (INHALATION) at 09:39

## 2018-02-19 RX ADMIN — POLYETHYLENE GLYCOL 3350 SCH GM: 17 POWDER, FOR SOLUTION ORAL at 10:00

## 2018-02-19 RX ADMIN — ALLOPURINOL SCH MG: 100 TABLET ORAL at 09:40

## 2018-02-19 RX ADMIN — INSULIN ASPART SCH: 100 INJECTION, SOLUTION INTRAVENOUS; SUBCUTANEOUS at 06:25

## 2018-02-19 RX ADMIN — PANTOPRAZOLE SODIUM SCH MG: 40 INJECTION, POWDER, FOR SOLUTION INTRAVENOUS at 09:39

## 2018-02-19 RX ADMIN — GABAPENTIN SCH MG: 100 CAPSULE ORAL at 06:24

## 2018-02-19 RX ADMIN — VALSARTAN SCH MG: 160 TABLET, FILM COATED ORAL at 09:39

## 2018-02-19 RX ADMIN — FUROSEMIDE SCH MG: 40 TABLET ORAL at 06:24

## 2018-02-19 RX ADMIN — GABAPENTIN SCH MG: 100 CAPSULE ORAL at 13:51

## 2018-02-19 NOTE — PN
Progress Note (short form)





- Note


Progress Note: 


CC: pre-op clearance





 


S: no recurrence of chest heaviness, no sob, palps, dizziness.  











 


 


 


 Current Medications











Generic Name Dose Route Start Last Admin





  Trade Name Samir  PRN Reason Stop Dose Admin


 


Allopurinol  100 mg  02/08/18 10:00  02/19/18 09:40





  Zyloprim -  PO   100 mg





  DAILY ELLA   Administration


 


Amlodipine Besylate  2.5 mg  02/08/18 10:00  02/19/18 09:39





  Norvasc -  PO   2.5 mg





  DAILY ELLA   Administration


 


Atorvastatin Calcium  20 mg  02/07/18 22:00  02/18/18 21:34





  Lipitor -  PO   20 mg





  HS ELLA   Administration


 


Budesonide/Formoterol Fumarate  2 puff  02/07/18 22:00  02/19/18 09:39





  Symbicort 160/4.5mcg -  IH   2 puff





  BID ELLA   Administration


 


Furosemide  80 mg  02/17/18 06:00  02/19/18 06:24





  Lasix -  PO   80 mg





  BIDLASIX ELLA   Administration


 


Gabapentin  200 mg  02/07/18 15:15  02/19/18 06:24





  Neurontin -  PO   100 mg





  TID ELLA   Administration


 


Insulin Aspart  1 vial  02/07/18 07:00  02/19/18 06:25





  Novolog Vial Sliding Scale -  SQ   Not Given





  TIDAC [facility]   





  Protocol   


 


Pantoprazole Sodium  40 mg  02/06/18 17:45  02/19/18 09:39





  Protonix Iv  IVPUSH   40 mg





  DAILY ELLA   Administration


 


Polyethylene Glycol  17 gm  02/06/18 22:00  02/18/18 21:34





  Miralax (For Daily Use) -  PO   Not Given





  BID ELLA   


 


Valsartan  320 mg  02/08/18 10:00  02/19/18 09:39





  Diovan -  PO   320 mg





  DAILY ELLA   Administration








 Vital Signs











 Period  Temp  Pulse  Resp  BP Sys/Boothe  Pulse Ox


 


 Last 24 Hr  98.2 F-98.8 F  54-71  20-20  115-146/43-56  99











nad calm


jvd flat, neck supple


ctab, nl effort


rrr nl s1, s2 no mrg


+ bs soft nt nd, obese


trace le edema. no c/c


aaox3


no jaundice, diaphoresis








 


 CBC, BMP





 02/19/18 06:35 





 02/18/18 06:37 








 


ekg 2/6: sb 55 bpm.  lad.  lafb.  new lateral twi.  


ekg 2/10: sb 48 bpm. lad. lafb.  slight improvement in lateral twave 

abnormality (now flat/biphasic)


tele: SB 40's-50's, occ pvc's





cxr: no acute pathology





echo 12/2017 :nl lv/rv size/fn.  impaired relaxation.  1+ lae. nl valves.  nl 

ivc.  no rvsp. 





holter 12/2016: avg HR 56 bpm.  no bradyarrhythmias.  no sig pauses.  1% pvc 

burden.  one run of psvt (4 beats).  





stress test 2/2018: No Ekg changes.  No ischemia.  nl perf/EF. 


stress test 2014: no ekg changes, sx's. bline hr 52 bpm.  nl perfusion/EF. 








A/P


88 yo with h/o HTN, venous insufficiency, DM, CKD, COPD, anemia, hyperkalemia, 

hypertension, gout, diverticulosis, who p/w [facility] hospital course c/b 

bradycardia, cp and troponin leak. 





pre-op clearance


- subtle changes on her admit ekg from baseline.  patient with episode of chest 

heaviness 2/10 --> subsequent slight rise and fall pattern in response to 

episode of chest heaviness (episode occurred a few days after hgb drop not 2/2 

demand from anemia). Concern for cardiac etiology to cp.  Patient was 

transferred to tele with plan for stress test   for risk stratification.    


- 2/11 discussed case with GI.  Patient not a candidate for anti-platelets b/c 

of GIB.  Discussed complexity of managing possible cad with patient due to ASA 

allergy and risk of bleeding on anti-platelets.   


- 2/12 awaiting results of stress test - prelim negative per report. 


- 2/13-14: Stress test negative.  No recurrence of chest heaviness.  No further 

testing needed prior to endoscopy.  Based on RCRI, functional status and age 

patient with intermediate estimated risk of mercedes-operative CV events.  Patient 

counseled on risk. In regard to bradycardia --> EKG is sinus alexx with normal 

intervals.  No bradyarrhythmias noted on tele monitoring and Holter.  No 

contraindication to anesthesia.  Would recommend avoidance of anesthesia that 

increases vagal tone when possible and monitor for apnea to avoid worsening 

bradycardia.  


- 2/15-: s/p endoscopy, hr/bp controlled. 





troponin elevation,  


- episode of chest heaviness here 2/10 with resulting troponin leak as 

mentioned above.  Similar more severe episode 2 weeks ago.  Additionally 

patient with decreased exercise capacity/progressive gonzalez over the past few 

months, --> concern for angina --> ordered stress test.  No evidence of 

ischemia on stress testing.  As mentioned above, patient not a candidate for 

anti-platelets.  Con't medical therapy for primary prevention with statin.  


- no recurrence of CP 





bradycardia


- has longstanding hx of bradycardia, asx.  Patient had Holter monitor for 

evaluation of bradycardia 12/2016.   Holter placed here as well --> no sig 

bradyarrhythmias.  Tele benign.  No contraindication to anesthesia, see 

discussion above. 


  


htn


- stable on valsartan and low dose norvasc





venous insufficiency


- cont po lasix 80 bid





gib:


-2/2 hemroids


-per gi





cardiac wise stable

## 2018-02-19 NOTE — DS
Physical Examination


Vital Signs: 


 Vital Signs











Temperature  98.6 F   02/19/18 10:00


 


Pulse Rate  57 L  02/19/18 10:00


 


Respiratory Rate  22   02/19/18 10:00


 


Blood Pressure  132/62   02/19/18 10:00


 


O2 Sat by Pulse Oximetry (%)  98   02/19/18 09:00











Findings/Remarks: 





see today progress note





Labs: 


 CBC, BMP





 02/19/18 06:35 





 02/18/18 06:37 











Discharge Summary


Reason For Visit: LOWER GASTROINTESTINAL HEMORRHAGE


Current Active Problems





Bradycardia (Acute)


Chest pain (Acute)


Diverticulosis (Acute)


Hypoglycemia associated with diabetes (Acute)


Lower GI bleeding (Acute)








Hospital Course: 





The patient is a 87 year old female, with a significant past medical history of 

diabetes mellitus, CKD, COPD, anemia, hyperkalemia, hypertension, gout, 

diverticulosis, who presented to the emergency department with, approx. 3 

episodes of bloody stool for the past 2 days.





pt admitted to hospital


transfused


underwent  egd/ colonoscopy - no obvious source


also underwent stress test / holtor for bradycardia


stress test -ve


pt now stable


cleared by GI for d/c


pt strongly advised to f/u with her pmd Dr. Mckee in week


Updated his pmd also


meds reconcilled 


d/c on protonix


avoid nsaids


Discussed with nursing staff/ pt


pt in agreement


d/c time 35 min 


Condition: Stable





- Instructions


Referrals: 


Angeli Ramires MD [Staff Physician] - 


Disposition: HOME





- Home Medications


Comprehensive Discharge Medication List: 


Ambulatory Orders





Allopurinol [Zyloprim -] 100 mg PO DAILY 07/19/14 


Budesonide/Formeterol Fumarate [SYMBICORT 160/4.5mcg -] 2 inh PO BID 07/19/14 


Gabapentin 200 mg PO TID 07/19/14 


Simvastatin [Zocor -] 40 mg PO HS 07/19/14 


Glipizide 5 mg PO BID 09/09/17 


Sennosides [Senna -] 2 tab PO HS PRN #60 tablet 09/14/17 


Furosemide [Lasix -] 80 mg PO DAILY 11/02/17 


Amlodipine Besylate [Norvasc -] 2.5 mg PO DAILY 02/06/18 


Valsartan 320 mg PO DAILY 02/06/18 


Pantoprazole Sodium [Protonix] 40 mg PO DAILY #30 tablet. 02/19/18 


Polyethylene Glycol 3350 [Miralax 119 gm Btl -] 17 gm PO BID  bottle 02/19/18

## 2018-02-19 NOTE — PN
Progress Note (short form)





- Note


Progress Note: 


pt seen/ examined


no bleeding overnight.


comfortable


denies pain.


decrease in h/h today 


 


 


 Vital Signs











Temp  98.7 F   02/19/18 06:00


 


Pulse  54 L  02/19/18 06:00


 


Resp  20   02/19/18 06:00


 


BP  146/56   02/19/18 06:00


 


Pulse Ox  99   02/18/18 21:00








 Intake & Output











 02/18/18 02/18/18 02/19/18





 11:59 23:59 11:59


 


Intake Total 200 260 210


 


Balance 200 260 210


 


Weight 206 lb 9 oz  198 lb 3 oz


 


Intake:   


 


  IV 0 10 10


 


    sl 0 10 10


 


  Oral 200 250 200


 


Other:   


 


  Voiding Method Bedside Commode Bedside Commode Bedside Commode


 


  # Unmeasured Voids   


 


    Void 3 2 2


 


  Bowel Movement Yes No No


 


  # Bowel Movements 2  


 


  Weight Measurement Method Standing Scale  Standing Scale








Active Medications





Allopurinol (Zyloprim -)  100 mg PO DAILY [facility]


   Last Admin: 02/19/18 09:40 Dose:  100 mg


Amlodipine Besylate (Norvasc -)  2.5 mg PO DAILY [facility]


   Last Admin: 02/19/18 09:39 Dose:  2.5 mg


Atorvastatin Calcium (Lipitor -)  20 mg PO HS [facility]


   Last Admin: 02/18/18 21:34 Dose:  20 mg


Budesonide/Formoterol Fumarate (Symbicort 160/4.5mcg -)  2 puff IH BID [facility]


   Last Admin: 02/19/18 09:39 Dose:  2 puff


Furosemide (Lasix -)  80 mg PO BIDLASIX [facility]


   Last Admin: 02/19/18 06:24 Dose:  80 mg


Gabapentin (Neurontin -)  200 mg PO TID [facility]


   Last Admin: 02/19/18 06:24 Dose:  100 mg


Insulin Aspart (Novolog Vial Sliding Scale -)  1 vial SQ TIDAC [facility]


   PRN Reason: Protocol


   Last Admin: 02/19/18 06:25 Dose:  Not Given


Pantoprazole Sodium (Protonix Iv)  40 mg IVPUSH DAILY [facility]


   Last Admin: 02/19/18 09:39 Dose:  40 mg


Polyethylene Glycol (Miralax (For Daily Use) -)  17 gm PO BID [facility]


   Last Admin: 02/18/18 21:34 Dose:  Not Given


Valsartan (Diovan -)  320 mg PO DAILY [facility]


   Last Admin: 02/19/18 09:39 Dose:  320 mg





 CBC, BMP





 02/19/18 06:35 





 02/18/18 06:37 





 


Physical Exam.


Constitutional: Yes: No Distress. comfortable. obese.


Cardiovascular: Yes: Regular Rate and Rhythm


Respiratory: Yes: CTA Bilaterally


Gastrointestinal: Yes: Normal Bowel Sounds, Soft.  No: Tenderness. obese.


Edema: trace edema


Neuro- Alert and Awake.





Assessment/Plan


gi bleed


h/h - decreased today


monitor.


EGD-- Duodenitis, ?fungal patch-- await pathology


Colonoscopy-- ext hemorrhoids, diverticulosis


transfuse prn


discussed with Dr. Martin .


will follow.


d/c planning --if cleared - will discharge later today with close monitoring of 

h/h as out pt.


Discussed with pt/ nursing staff also.


will follow.
































Problem List





- Problems


(1) Bradycardia


Code(s): R00.1 - BRADYCARDIA, UNSPECIFIED   





(2) Hypoglycemia associated with diabetes


Code(s): E11.649 - TYPE 2 DIABETES MELLITUS WITH HYPOGLYCEMIA WITHOUT COMA   





(3) Diverticulosis


Code(s): K57.90 - DVRTCLOS OF INTEST, PART UNSP, W/O PERF OR ABSCESS W/O BLEED 

  


Qualifiers: 


   Diverticulosis site: diverticulosis of large intestine   Diverticulosis 

bleeding: diverticulosis with bleeding   Qualified Code(s): K57.31 - 

Diverticulosis of large intestine without perforation or abscess with bleeding 

  





(4) Lower GI bleeding


Code(s): K92.2 - GASTROINTESTINAL HEMORRHAGE, UNSPECIFIED   





(5) Acute on chronic renal failure


Code(s): N17.9 - ACUTE KIDNEY FAILURE, UNSPECIFIED; N18.9 - CHRONIC KIDNEY 

DISEASE, UNSPECIFIED

## 2018-02-19 NOTE — PN
GI Progress Note


Subjective: 





Ms. Mcdonald reported bleeding friday


Denies bleeding yesterday or today








- Objective


Vital Signs: 


 Vital Signs











Temperature  98.7 F   02/19/18 06:00


 


Pulse Rate  54 L  02/19/18 06:00


 


Respiratory Rate  20   02/19/18 06:00


 


Blood Pressure  146/56   02/19/18 06:00


 


O2 Sat by Pulse Oximetry (%)  99   02/18/18 21:00











Constitutional: Calm


Eyes: No: Sclera Icterus


Cardiovascular: Yes: Bradycardia


Gastrointestinal Inspection: No: Distention


...Auscultate: Yes: Normoactive Bowel Sounds


...Palpate: No: Tenderness


...Rectal Exam: Yes: Other (External hemorrhoid at 6 O'clock position, light 

tan stool in rectal vault. No blood)


Neurological: Yes: Alert, Oriented


Labs: 


 CBC, BMP





 02/19/18 06:35 





 02/18/18 06:37 





 INR, PTT











INR  1.08  (0.82-1.09)   02/06/18  12:31    














Problem List





- Problems


(1) Lower GI bleeding


Assessment/Plan: 


EGD and colonoscopy were essentially unrevealing.  There was no overt blood 

noted


Anusol HC cream to external hemorrhoid BID for 3 days


If continued episodic bleeding, flex sig to evaluate hemorrhoids again 


Heme evaluation of chronic anemia


Code(s): K92.2 - GASTROINTESTINAL HEMORRHAGE, UNSPECIFIED

## 2018-05-06 ENCOUNTER — HOSPITAL ENCOUNTER (INPATIENT)
Dept: HOSPITAL 74 - JER | Age: 83
LOS: 4 days | Discharge: HOME | DRG: 683 | End: 2018-05-10
Admitting: INTERNAL MEDICINE
Payer: COMMERCIAL

## 2018-05-06 VITALS — BODY MASS INDEX: 36.5 KG/M2

## 2018-05-06 DIAGNOSIS — Z80.0: ICD-10-CM

## 2018-05-06 DIAGNOSIS — R42: ICD-10-CM

## 2018-05-06 DIAGNOSIS — K29.60: ICD-10-CM

## 2018-05-06 DIAGNOSIS — K57.90: ICD-10-CM

## 2018-05-06 DIAGNOSIS — E87.5: ICD-10-CM

## 2018-05-06 DIAGNOSIS — K92.2: ICD-10-CM

## 2018-05-06 DIAGNOSIS — K64.9: ICD-10-CM

## 2018-05-06 DIAGNOSIS — N17.9: Primary | ICD-10-CM

## 2018-05-06 DIAGNOSIS — D64.9: ICD-10-CM

## 2018-05-06 DIAGNOSIS — R00.1: ICD-10-CM

## 2018-05-06 DIAGNOSIS — E78.5: ICD-10-CM

## 2018-05-06 DIAGNOSIS — K59.00: ICD-10-CM

## 2018-05-06 DIAGNOSIS — J44.9: ICD-10-CM

## 2018-05-06 DIAGNOSIS — M10.9: ICD-10-CM

## 2018-05-06 DIAGNOSIS — E66.8: ICD-10-CM

## 2018-05-06 DIAGNOSIS — N18.9: ICD-10-CM

## 2018-05-06 DIAGNOSIS — E86.1: ICD-10-CM

## 2018-05-06 DIAGNOSIS — E11.21: ICD-10-CM

## 2018-05-06 DIAGNOSIS — I87.2: ICD-10-CM

## 2018-05-06 DIAGNOSIS — E11.22: ICD-10-CM

## 2018-05-06 DIAGNOSIS — I13.0: ICD-10-CM

## 2018-05-06 DIAGNOSIS — I50.9: ICD-10-CM

## 2018-05-06 DIAGNOSIS — E11.319: ICD-10-CM

## 2018-05-06 LAB
ALBUMIN SERPL-MCNC: 3.2 G/DL (ref 3.4–5)
ALP SERPL-CCNC: 106 U/L (ref 45–117)
ALT SERPL-CCNC: 18 U/L (ref 12–78)
ANION GAP SERPL CALC-SCNC: 6 MMOL/L (ref 8–16)
APPEARANCE UR: CLEAR
AST SERPL-CCNC: 18 U/L (ref 15–37)
BASOPHILS # BLD: 1.1 % (ref 0–2)
BILIRUB SERPL-MCNC: 0.3 MG/DL (ref 0.2–1)
BILIRUB UR STRIP.AUTO-MCNC: NEGATIVE MG/DL
BUN SERPL-MCNC: 56 MG/DL (ref 7–18)
CALCIUM SERPL-MCNC: 8.8 MG/DL (ref 8.5–10.1)
CHLORIDE SERPL-SCNC: 108 MMOL/L (ref 98–107)
CO2 SERPL-SCNC: 27 MMOL/L (ref 21–32)
COLOR UR: (no result)
CREAT SERPL-MCNC: 3 MG/DL (ref 0.55–1.02)
DEPRECATED RDW RBC AUTO: 17.6 % (ref 11.6–15.6)
EOSINOPHIL # BLD: 1.2 % (ref 0–4.5)
GLUCOSE SERPL-MCNC: 147 MG/DL (ref 74–106)
HCT VFR BLD CALC: 23 % (ref 32.4–45.2)
HGB BLD-MCNC: 7.5 GM/DL (ref 10.7–15.3)
INR BLD: 1.17 (ref 0.82–1.09)
KETONES UR QL STRIP: NEGATIVE
LEUKOCYTE ESTERASE UR QL STRIP.AUTO: NEGATIVE
LYMPHOCYTES # BLD: 11.9 % (ref 8–40)
MCH RBC QN AUTO: 29.2 PG (ref 25.7–33.7)
MCHC RBC AUTO-ENTMCNC: 32.5 G/DL (ref 32–36)
MCV RBC: 89.7 FL (ref 80–96)
MONOCYTES # BLD AUTO: 6.8 % (ref 3.8–10.2)
NEUTROPHILS # BLD: 79 % (ref 42.8–82.8)
NITRITE UR QL STRIP: NEGATIVE
PH UR: 6 [PH] (ref 5–8)
PLATELET # BLD AUTO: 218 K/MM3 (ref 134–434)
PMV BLD: 9.6 FL (ref 7.5–11.1)
POTASSIUM SERPLBLD-SCNC: 5.2 MMOL/L (ref 3.5–5.1)
PROT SERPL-MCNC: 7.1 G/DL (ref 6.4–8.2)
PROT UR QL STRIP: NEGATIVE
PROT UR QL STRIP: NEGATIVE
PT PNL PPP: 13.2 SEC (ref 9.7–13)
RBC # BLD AUTO: 2.56 M/MM3 (ref 3.6–5.2)
SODIUM SERPL-SCNC: 141 MMOL/L (ref 136–145)
SP GR UR: 1.01 (ref 1–1.03)
UROBILINOGEN UR STRIP-MCNC: NEGATIVE MG/DL (ref 0.2–1)
WBC # BLD AUTO: 7 K/MM3 (ref 4–10)

## 2018-05-06 PROCEDURE — P9058 RBC, L/R, CMV-NEG, IRRAD: HCPCS

## 2018-05-06 PROCEDURE — G0378 HOSPITAL OBSERVATION PER HR: HCPCS

## 2018-05-06 PROCEDURE — P9038 RBC IRRADIATED: HCPCS

## 2018-05-06 RX ADMIN — POLYETHYLENE GLYCOL 3350 SCH GM: 17 POWDER, FOR SOLUTION ORAL at 22:22

## 2018-05-06 RX ADMIN — INSULIN ASPART SCH: 100 INJECTION, SOLUTION INTRAVENOUS; SUBCUTANEOUS at 22:22

## 2018-05-06 RX ADMIN — BUDESONIDE AND FORMOTEROL FUMARATE DIHYDRATE SCH PUFF: 160; 4.5 AEROSOL RESPIRATORY (INHALATION) at 22:27

## 2018-05-06 RX ADMIN — GABAPENTIN SCH MG: 100 CAPSULE ORAL at 22:22

## 2018-05-06 NOTE — PDOC
*Physical Exam





- Vital Signs


 Last Vital Signs











Temp Pulse Resp BP Pulse Ox


 


 98.7 F   62   17   188/65   99 


 


 05/06/18 15:41  05/06/18 17:00  05/06/18 17:00  05/06/18 17:00  05/06/18 17:00














ED Treatment Course





- LABORATORY


CBC & Chemistry Diagram: 


 05/06/18 16:30





 05/06/18 16:30





- ADDITIONAL ORDERS


Additional order review: 


 Laboratory  Results











  05/06/18 05/06/18 05/06/18





  16:30 16:30 16:30


 


PT with INR   


 


INR   


 


PTT (Actin FS)   25.5 L 


 


Sodium    141


 


Potassium    5.2 H


 


Chloride    108 H


 


Carbon Dioxide    27


 


Anion Gap    6 L


 


BUN    56 H


 


Creatinine    3.0 H


 


Creat Clearance w eGFR    14.76


 


Random Glucose    147 H


 


Calcium    8.8


 


Total Bilirubin    0.3  D


 


AST    18


 


ALT    18


 


Alkaline Phosphatase    106


 


Creatine Kinase  114  


 


Troponin I  < 0.02  


 


Total Protein    7.1


 


Albumin    3.2 L














  05/06/18





  16:30


 


PT with INR  13.20 H


 


INR  1.17 H


 


PTT (Actin FS) 


 


Sodium 


 


Potassium 


 


Chloride 


 


Carbon Dioxide 


 


Anion Gap 


 


BUN 


 


Creatinine 


 


Creat Clearance w eGFR 


 


Random Glucose 


 


Calcium 


 


Total Bilirubin 


 


AST 


 


ALT 


 


Alkaline Phosphatase 


 


Creatine Kinase 


 


Troponin I 


 


Total Protein 


 


Albumin 








 











  05/06/18





  16:30


 


RBC  2.56 L


 


MCV  89.7


 


MCHC  32.5


 


RDW  17.6 H


 


MPV  9.6


 


Neutrophils %  79.0  D


 


Lymphocytes %  11.9  D


 


Monocytes %  6.8


 


Eosinophils %  1.2


 


Basophils %  1.1














- Medications


Given in the ED: 


ED Medications














Discontinued Medications














Generic Name Dose Route Start Last Admin





  Trade Name Freq  PRN Reason Stop Dose Admin


 


Albuterol Sulfate  1 amp  05/06/18 18:13  05/06/18 18:59





  Ventolin 0.5% -  NEB  05/06/18 18:14  1 amp





  ONCE ONE   Administration


 


Meclizine HCl  25 mg  05/06/18 17:48  05/06/18 17:57





  Antivert -  PO  05/06/18 17:49  25 mg





  ONCE ONE   Administration


 


Sodium Chloride  500 ml  05/06/18 18:02  05/06/18 18:59





  Normal Saline -  IV  05/06/18 18:03  500 ml





  ONCE ONE   Administration














Medical Decision Making





- Medical Decision Making


 Patient signed out by Dr. Newman pending admission in stable condition for PATRIZIA (

with CKD workup) and r/o cerebellar stroke.


05/06/18 19:38








*DC/Admit/Observation/Transfer


Diagnosis at time of Disposition: 


 PATRIZIA (acute kidney injury), Vertigo








- Discharge Dispostion


Admit: Yes





- Referrals


Referrals: 


Jace Mckee MD [Primary Care Provider] - 





- Patient Instructions





- Post Discharge Activity

## 2018-05-06 NOTE — HP
CHIEF COMPLAINT: lightheadedness, dizziness





PCP: Rafi





HISTORY OF PRESENT ILLNESS:


This is an 87 year old female with multiple medical problems including DM, CKD, 

HTN, anemia s/p GI bleed 2018 who presented to the ED for dizziness and 

lightheadedness. Pt reports she has been having dizziness for about a month 

associated with an "ear problem". She describes the dizziness as room spinning 

and is worse when she lays flat. She was evaluated by an ENT and was given some 

positioning maneuvers which she states did not help. About 3 days ago she 

developed what she describes as a different kind of dizziness. This is more of 

a lightheadedness and occurs when she gets up and starts walking. It has been 

progressively getting worse over the past three days. No symptoms at time of 

exam upon standing. Pt also reports that she had an episode of rectal bleeding 

in April which she did not go to the doctor for. She reports intermittent black 

stools since then as well as mucousy stools.





ER course was notable for:


(1) Hgb 7.5


(2) BUN 56/Cr 3.0 (baseline 1.9)


(3) CT head unremarkable





Recent Travel:


pt denies





PAST MEDICAL HISTORY:


DM, CKD, HTN, COPD, anemia, recent GI bleed 2018 (workup in hospital negative)

, diverticulitis, gout


PAST SURGICAL HISTORY:


appendectomy


tonsillectomy


R ovarian cyst removal


B/L breast cyst removal


"ruptured navel"


umbilical hernia repair


pleurisy surgery right lung





Social History:


Smoking: pt denies


Alcohol: 1 pint per month. Pt states that she drinks the pint over the course 

of 3 days and then none until the next month


Drugs: pt denies





Family History:


mother , rectal CA, age 60s


father , rectal CA, 60s


son with DM, prostate CA





Allergies


diphenhydramine HCl [From Benadryl] Allergy (Severe, Verified 18 15:41)


 anaphyl


aspirin Allergy (Unknown, Verified 18 15:41)


 Swelling


 swelling, hives 


natty flavor Adverse Reaction (Severe, Verified 18 15:41)


 Swelling


 swelling, hives 





HOME MEDICATIONS:


 3





 Medication  Instructions  Recorded


 


Allopurinol [Zyloprim -] 100 mg PO DAILY 14


 


Budesonide/Formeterol Fumarate 2 inh PO BID 14





[SYMBICORT 160/4.5mcg -]  


 


Gabapentin 200 mg PO TID 14


 


Glipizide 5 mg PO DAILY 17


 


Furosemide [Lasix -] 100 mg PO DAILY 17


 


Valsartan 320 mg PO DAILY 18


 


Pantoprazole Sodium [Protonix] 40 mg PO DAILY #30 tablet. 18


 


Polyethylene Glycol 3350 [Miralax 17 gm PO BID  bottle 18





119 gm Btl -]  








REVIEW OF SYSTEMS


CONSTITUTIONAL: 


Absent:  fever, chills, diaphoresis, generalized weakness, malaise, loss of 

appetite, weight change


HEENT: 


Absent:  rhinorrhea, nasal congestion, throat pain, throat swelling, difficulty 

swallowing, mouth swelling, ear pain, eye pain, visual changes


CARDIOVASCULAR: 


Absent: chest pain, syncope, palpitations, irregular heart rate, lightheadedness

, peripheral edema


RESPIRATORY: 


Absent: cough, shortness of breath, dyspnea with exertion, orthopnea, wheezing, 

stridor, hemoptysis


GASTROINTESTINAL:


Absent: abdominal pain, abdominal distension, nausea, vomiting, diarrhea, 

constipation, melena, hematochezia


GENITOURINARY: 


Absent: dysuria, frequency, urgency, hesitancy, hematuria, flank pain, genital 

pain


MUSCULOSKELETAL: 


Absent: myalgia, arthralgia, joint swelling, back pain, neck pain


SKIN: 


Absent: rash, itching, pallor


HEMATOLOGIC/IMMUNOLOGIC: 


Absent: easy bleeding, easy bruising, lymphadenopathy, frequent infections


ENDOCRINE:


Absent: unexplained weight gain, unexplained weight loss, heat intolerance, 

cold intolerance


NEUROLOGIC: Present: dizziness


Absent: headache, focal weakness or paresthesias, unsteady gait, seizure, 

mental status changes, bladder or bowel incontinence


PSYCHIATRIC: 


Absent: anxiety, depression, suicidal or homicidal ideation, hallucinations.








PHYSICAL EXAMINATION


Vital Signs - 24 hr





 3





  18





  15:41 17:00 20:44


 


Temperature 98.7 F  98.2 F


 


Pulse Rate 57 L  


 


Pulse Rate [  62 58 L





Apical]   


 


Respiratory 18 17 17





Rate   


 


Blood Pressure 137/44  


 


Blood Pressure  188/65 147/50





[Right Arm]   


 


O2 Sat by Pulse 100 99 96





Oximetry (%)   








GENERAL: Awake, alert, and fully oriented, in no acute distress.


HEAD: Normal with no signs of trauma.


EYES: Pupils equal, round and reactive to light, extraocular movements intact, 

sclera anicteric, conjunctiva clear. No lid lag.


EARS, NOSE, THROAT: Ears normal, nares patent, oropharynx clear without 

exudates. Moist mucous membranes.


NECK: Normal range of motion, supple without lymphadenopathy, JVD, or masses.


LUNGS: Breath sounds equal, clear to auscultation bilaterally. No wheezes, and 

no crackles. No accessory muscle use.


HEART: Regular rate and rhythm, normal S1 and S2 without murmur, rub or gallop.


ABDOMEN: Soft, nontender, not distended, normoactive bowel sounds, no guarding, 

no rebound, no masses.  No hepatomegaly or  splenomegaly. 


MUSCULOSKELETAL: Normal range of motion at all joints. No bony deformities or 

tenderness. No CVA tenderness.


UPPER EXTREMITIES: 2+ pulses, warm, well-perfused. No cyanosis. No clubbing. No 

peripheral edema.


LOWER EXTREMITIES: 2+ pulses, warm, well-perfused. No calf tenderness. 1+ 

pitting peripheral edema bilat. 


NEUROLOGICAL:  Cranial nerves II-XII intact. Normal speech. Normal gait. No 

dizziness upon standing.


PSYCHIATRIC: Cooperative. Good eye contact. Appropriate mood and affect.


SKIN: Warm, dry, normal turgor, no rashes or lesions noted, normal capillary 

refill. 


 


Laboratory Results - last 24 hr





 3





  18





  16:30 16:30 16:30


 


WBC    7.0


 


RBC    2.56 L


 


Hgb    7.5 L D


 


Hct    23.0 L


 


MCV    89.7


 


MCH    29.2


 


MCHC    32.5


 


RDW    17.6 H


 


Plt Count    218  D


 


MPV    9.6


 


Neutrophils %    79.0  D


 


Lymphocytes %    11.9  D


 


Monocytes %    6.8


 


Eosinophils %    1.2


 


Basophils %    1.1


 


PT with INR  13.20 H  


 


INR  1.17 H  


 


PTT (Actin FS)   


 


Sodium   141 


 


Potassium   5.2 H 


 


Chloride   108 H 


 


Carbon Dioxide   27 


 


Anion Gap   6 L 


 


BUN   56 H 


 


Creatinine   3.0 H 


 


Creat Clearance w eGFR   14.76 


 


Random Glucose   147 H 


 


Calcium   8.8 


 


Total Bilirubin   0.3  D 


 


AST   18 


 


ALT   18 


 


Alkaline Phosphatase   106 


 


Creatine Kinase   


 


Troponin I   


 


Total Protein   7.1 


 


Albumin   3.2 L 


 


Urine Color   


 


Urine Appearance   


 


Urine pH   


 


Ur Specific Gravity   


 


Urine Protein   


 


Urine Glucose (UA)   


 


Urine Ketones   


 


Urine Blood   


 


Urine Nitrite   


 


Urine Bilirubin   


 


Urine Urobilinogen   


 


Ur Leukocyte Esterase   








 3





  18





  16:30 16:30 19:48


 


WBC   


 


RBC   


 


Hgb   


 


Hct   


 


MCV   


 


MCH   


 


MCHC   


 


RDW   


 


Plt Count   


 


MPV   


 


Neutrophils %   


 


Lymphocytes %   


 


Monocytes %   


 


Eosinophils %   


 


Basophils %   


 


PT with INR   


 


INR   


 


PTT (Actin FS)  25.5 L  


 


Sodium   


 


Potassium   


 


Chloride   


 


Carbon Dioxide   


 


Anion Gap   


 


BUN   


 


Creatinine   


 


Creat Clearance w eGFR   


 


Random Glucose   


 


Calcium   


 


Total Bilirubin   


 


AST   


 


ALT   


 


Alkaline Phosphatase   


 


Creatine Kinase   114 


 


Troponin I   < 0.02 


 


Total Protein   


 


Albumin   


 


Urine Color    Straw


 


Urine Appearance    Clear


 


Urine pH    6.0


 


Ur Specific Gravity    1.006


 


Urine Protein    Negative


 


Urine Glucose (UA)    Negative


 


Urine Ketones    Negative


 


Urine Blood    Negative


 


Urine Nitrite    Negative


 


Urine Bilirubin    Negative


 


Urine Urobilinogen    Negative


 


Ur Leukocyte Esterase    Negative








ECG


sinus bradycardia


vent rate58, 


no acute ST/T changes





Radiology Reports


CT head noncontrast


THIS IS A PRELIMINARY REPORT FROM IMAGING ON CALL


IMPRESSION:


No acute territorial infarct, hemorrhage, or space-occupying mass.


THIS DOCUMENT HAS BEEN ELECTRONICALLY SIGNED


Sung Figueroa MD


2018 19:10 EST








ASSESSMENT/PLAN:


87yF with PMH DM, CKD, HTN, COPD, anemia, recent GI bleed 2018 (workup in 

hospital negative), diverticulitis, gout presented to the ED with new 

lightheadedness x 3 days.





Lightheadedness


   - CT head neg


   - Known h/o vertigo. Given meclizine in ED with improvement.


   - orthostatic VS negative for orthostatic hypotension.


   - consider neuro consult if no improvement





PATRIZIA


   - likely due to hypovolemia, slow GI bleed


   - given 500cc NS in ED, given peripheral edema will hold on further IVF as 

pt may need blood


   - hold po home lasix for now


   - renal consult if not improving





recurrent GI bleed


   - intermittent slow GI bleed likely


   - stool guaiac obtained and sent


   - CBC at 12am


   - transfuse if Hgb less than 7


   - GI consult





HTN


   - cont home meds except lasix





DM


   - hold home glipizide


   - novolog SS AC/HS





COPD


   - cont home meds, stable presently





DVT PPX


   - defer for now as pt may be bleeding





FEN


   - no further IVF at this time


   - BMP in am


   - diabetic/low sodium diet as tolerated unless stool guaiac positive





Dispo: pt currently requires further observation for management of her emergent 

condition.








Visit type





- Emergency Visit


Emergency Visit: Yes


ED Registration Date: 18


Care time: The patient presented to the Emergency Department on the above date 

and was hospitalized for further evaluation of their emergent condition.





- New Patient


This patient is new to me today: Yes


Date on this admission: 18





- Critical Care


Critical Care patient: No





Hospitalist Screening





- Colonoscopy Questionnaire


Colonoscopy Questionnaire: 





Colonoscopy Questionnaire








-   Patient:


50 - 75 years old and never had a screening colonoscopy: No


History of colon or rectal polyps, or CA: No


History of IBD, Crohn's disease or UC: No


History of abdominal radiation therapy as a child: No





-   Relative:


1 with colon or rectal CA, or polyps at age 60 or younger: Unknown


Colon or rectal CA diagnosed at age 45 or younger: No


Multiple relatives with colon or rectal CA: Yes





-   Outcome:


Screening Result: Positive Screen

## 2018-05-06 NOTE — PDOC
History of Present Illness





- General


Chief Complaint: Lightheaded


Stated Complaint: DIZZINESS


History Source: Patient, Family





- History of Present Illness


Initial Comments: 





05/06/18 16:36


Pt is an 86 yo F, with signif PMHx of DM, CKD, COPD, anemia, hyperkalemia, 

hypertension, gout, diverticulosis with recent rectal bleed now presenting with 

worsening dizziness. Pt reports feeling the room spinning around her, both when 

she changes position in bed and when she walks around, that has worsened over 

the past 3 days. The dizziness is worse when she lays flat and when she gets up 

from a sitting position. Patient has had nasal congestion over the past week, 

but no fever, no headache, no blurring of vision, no hx of bug bites. No 

weakness of any part of her body. About a month ago, she saw an ENT for similar 

symptoms associated with pain in the right ear, both no hearing loss and no ear 

discharge. Pt denies trauma, nausea or vomiting, denies photophobia or 

phonophobia. No SOB, no palpitations, no syncope. No prior hx of migraines. Pt 

is normally independent, lives alone, ambulates with a cane or walker, but has 

been unable to do that because of the dizziness this past 2 days. Hx of 

significant alcohol ingestion.





05/06/18 17:55











Associated Symptoms: denies: chest pain, cough





Past History





- Past Medical History


Allergies/Adverse Reactions: 


 Allergies











Allergy/AdvReac Type Severity Reaction Status Date / Time


 


diphenhydramine HCl Allergy Severe anaphyl Verified 05/06/18 15:41





[From Benadryl]     


 


aspirin Allergy Unknown Swelling Verified 05/06/18 15:41


 


ntaty flavor AdvReac Severe Swelling Verified 05/06/18 15:41











Home Medications: 


Ambulatory Orders





Allopurinol [Zyloprim -] 100 mg PO DAILY 07/19/14 


Budesonide/Formeterol Fumarate [SYMBICORT 160/4.5mcg -] 2 inh PO BID 07/19/14 


Gabapentin 200 mg PO TID 07/19/14 


Glipizide 5 mg PO DAILY 09/09/17 


Furosemide [Lasix -] 100 mg PO DAILY 11/02/17 


Valsartan 320 mg PO DAILY 02/06/18 


Pantoprazole Sodium [Protonix] 40 mg PO DAILY #30 tablet. 02/19/18 


Polyethylene Glycol 3350 [Miralax 119 gm Btl -] 17 gm PO BID  bottle 02/19/18 








Anemia: Yes


Asthma: Yes


Cardiac Disorders: Yes (bradycardia)


COPD: No


Diabetes: Yes


GI Disorders: Yes (diverticulitis)


HTN: Yes


Hypercholesterolemia: Yes


Other medical history: vertigo





- Surgical History


Abdominal Surgery: Yes (hernia repairs, small bowel resection,debride abd wall 

abscess,)


Appendectomy: Yes





- Immunization History


Immunization Up to Date: Yes





- Suicide/Smoking/Psychosocial Hx


Smoking Status: No


Smoking History: Never smoked


Have you smoked in the past 12 months: No


Number of Cigarettes Smoked Daily: 0


Information on smoking cessation initiated: No


Hx Alcohol Use: No


Drug/Substance Use Hx: No


Substance Use Type: None


Hx Substance Use Treatment: No





*Physical Exam





- Vital Signs


 Last Vital Signs











Temp Pulse Resp BP Pulse Ox


 


 98.7 F   57 L  18   137/44   100 


 


 05/06/18 15:41  05/06/18 15:41  05/06/18 15:41  05/06/18 15:41  05/06/18 15:41











05/06/18 17:53


 Orthostatic BP


Lying - 150/54, 58


Sitting-149/55, 54


Standing -188/65, 62








- Physical Exam


General Appearance: No: Apparent Distress


HEENT: positive: EOMI, ROSEMARIE, Pharynx Normal.  negative: Tonsillar Exudate, 

Sinus Tenderness, Thrush


Neck: positive: Supple.  negative: Tender, Stridor


Respiratory/Chest: positive: Lungs Clear, Normal Breath Sounds.  negative: 

Respiratory Distress, Labored Respiration


Cardiovascular: positive: Regular Rhythm, Regular Rate, S1, S2.  negative: 

Systolic Murmur


Gastrointestinal/Abdominal: positive: Normal Bowel Sounds, Soft


Musculoskeletal: negative: CVA Tenderness


Extremity: positive: Pedal Edema (chronic bilateral edema).  negative: Calf 

Tenderness


Integumentary: positive: Dry, Warm


Neurologic: positive: Fully Oriented, Alert, Other (Motor strength 4+/5 

bilateral lower limb, 5/5 bilateral UE).  negative: EOM Palsy, Facial Droop, 

Numbness





ED Treatment Course





- LABORATORY


CBC & Chemistry Diagram: 


 05/06/18 16:30





 05/06/18 16:30





Medical Decision Making





- Medical Decision Making





05/06/18 18:00


Basic labs CBC, CMP, PT./PTT, EKG, trops


orthostatic blood pressure done


05/06/18 18:26


Considering patient's age, cerebellar stroke is considered


CT head without contrast


Maybe further worked up with MRI by admitting team


CBC- H&H-7.5, down from 8.7 in recent past


Cr-3.0, BUN 57


D/W patient who said Dr Mckee had mentioned her kidneys were not doing too 

well but she did not yet need dialysis


Called Dr Mckee- He said Cr of 3.0 is far above her baseline which had gone up 

to 1.9 in the past.


She does see Dr Shea (nephrology)- consult was not yet put in for him. Will 

discuss with admitting team


Pt should be admitted to Dr Covington's service.


He accepts that pt should be admitted for further work up to R/O the cerebellar 

stroke and for the PATRIZIA


K-5.2, albuterol 0.05% neb given stat





05/06/18 18:33




















*DC/Admit/Observation/Transfer


Diagnosis at time of Disposition: 


 PATRIZIA (acute kidney injury), Vertigo








- Referrals





- Patient Instructions





- Post Discharge Activity

## 2018-05-07 LAB
ANION GAP SERPL CALC-SCNC: 8 MMOL/L (ref 8–16)
BASOPHILS # BLD: 0.7 % (ref 0–2)
BUN SERPL-MCNC: 56 MG/DL (ref 7–18)
CALCIUM SERPL-MCNC: 9.2 MG/DL (ref 8.5–10.1)
CHLORIDE SERPL-SCNC: 108 MMOL/L (ref 98–107)
CO2 SERPL-SCNC: 29 MMOL/L (ref 21–32)
CREAT SERPL-MCNC: 2.8 MG/DL (ref 0.55–1.02)
CREAT UR-MCNC: 98.1 MG/DL (ref 20–320)
DEPRECATED RDW RBC AUTO: 17.1 % (ref 11.6–15.6)
EOSINOPHIL # BLD: 1.7 % (ref 0–4.5)
GLUCOSE SERPL-MCNC: 86 MG/DL (ref 74–106)
HCT VFR BLD CALC: 21.1 % (ref 32.4–45.2)
HGB BLD-MCNC: 7 GM/DL (ref 10.7–15.3)
LYMPHOCYTES # BLD: 17.1 % (ref 8–40)
MAGNESIUM SERPL-MCNC: 2.3 MG/DL (ref 1.8–2.4)
MCH RBC QN AUTO: 29.6 PG (ref 25.7–33.7)
MCHC RBC AUTO-ENTMCNC: 33.3 G/DL (ref 32–36)
MCV RBC: 88.7 FL (ref 80–96)
MONOCYTES # BLD AUTO: 8.5 % (ref 3.8–10.2)
NEUTROPHILS # BLD: 72 % (ref 42.8–82.8)
PHOSPHATE SERPL-MCNC: 5.3 MG/DL (ref 2.5–4.9)
PLATELET # BLD AUTO: 201 K/MM3 (ref 134–434)
PMV BLD: 8.4 FL (ref 7.5–11.1)
POTASSIUM SERPLBLD-SCNC: 4.7 MMOL/L (ref 3.5–5.1)
RBC # BLD AUTO: 2.37 M/MM3 (ref 3.6–5.2)
SODIUM SERPL-SCNC: 145 MMOL/L (ref 136–145)
WBC # BLD AUTO: 5.8 K/MM3 (ref 4–10)

## 2018-05-07 PROCEDURE — 30233N1 TRANSFUSION OF NONAUTOLOGOUS RED BLOOD CELLS INTO PERIPHERAL VEIN, PERCUTANEOUS APPROACH: ICD-10-PCS | Performed by: NURSE PRACTITIONER

## 2018-05-07 RX ADMIN — BUDESONIDE AND FORMOTEROL FUMARATE DIHYDRATE SCH PUFF: 160; 4.5 AEROSOL RESPIRATORY (INHALATION) at 22:28

## 2018-05-07 RX ADMIN — GABAPENTIN SCH MG: 100 CAPSULE ORAL at 22:26

## 2018-05-07 RX ADMIN — INSULIN ASPART SCH: 100 INJECTION, SOLUTION INTRAVENOUS; SUBCUTANEOUS at 11:34

## 2018-05-07 RX ADMIN — ALLOPURINOL SCH MG: 100 TABLET ORAL at 09:49

## 2018-05-07 RX ADMIN — GABAPENTIN SCH MG: 100 CAPSULE ORAL at 06:23

## 2018-05-07 RX ADMIN — POLYETHYLENE GLYCOL 3350 SCH GM: 17 POWDER, FOR SOLUTION ORAL at 22:28

## 2018-05-07 RX ADMIN — POLYETHYLENE GLYCOL 3350 SCH GM: 17 POWDER, FOR SOLUTION ORAL at 09:52

## 2018-05-07 RX ADMIN — PSYLLIUM HUSK SCH GM: 3.4 POWDER ORAL at 11:36

## 2018-05-07 RX ADMIN — MECLIZINE HYDROCHLORIDE SCH MG: 25 TABLET ORAL at 22:27

## 2018-05-07 RX ADMIN — GABAPENTIN SCH MG: 100 CAPSULE ORAL at 14:33

## 2018-05-07 RX ADMIN — BUDESONIDE AND FORMOTEROL FUMARATE DIHYDRATE SCH PUFF: 160; 4.5 AEROSOL RESPIRATORY (INHALATION) at 09:49

## 2018-05-07 RX ADMIN — INSULIN ASPART SCH: 100 INJECTION, SOLUTION INTRAVENOUS; SUBCUTANEOUS at 22:27

## 2018-05-07 RX ADMIN — MECLIZINE HYDROCHLORIDE SCH MG: 25 TABLET ORAL at 14:33

## 2018-05-07 RX ADMIN — INSULIN ASPART SCH: 100 INJECTION, SOLUTION INTRAVENOUS; SUBCUTANEOUS at 06:24

## 2018-05-07 RX ADMIN — INSULIN ASPART SCH: 100 INJECTION, SOLUTION INTRAVENOUS; SUBCUTANEOUS at 17:12

## 2018-05-07 NOTE — CON.GI
Consult


Consult Specialty:: GI


Referred by:: Hospitalist Service


Reason for Consultation:: Anemia





- History of Present Illness


Chief Complaint: Dizziness


History of Present Illness: 





87F admitted through [facility] ER for evaluation of dizziness.  She states that a 

month ago she had right ear pain, saw an ENT and was told that the "crystals in 

her ear were out out place".  She described the dizziness as her head spinning 

and did not feel lightheaded.  She states that it never seemed to improve but 

also began experiencing dizziness (head spinning) when she gets up from a lying 

position.  She had an episode of bleeding "Only with bowel movements and 

sometimes only on the toilet paper" for 2 days in April.  The blood was bright 

red without clots.  She describes straining with her bowel movements even 

though the stool is soft.  She is on MiraLAX 17g once daily. She has a history 

of constipation prior to the onset of bleeding in the past. She has a history 

of recurrent lower GI bleeding felt to be diverticular and possibly 

hemorrhoidal in origin at times and last had such an episode .  She had an 

EGD and colonoscopy by myself on 2/15/18 revealing candida esophagitis of the 

proximal esophagus (unclear if treated) and external hemorrhoids, colocolonic 

anastamosis at 25cm, diverticulosis, small internal hemorrrhoids (without 

stigmata of bleeding).  There was no blood in the colon.  In  16 She had 

an EGD and colonoscopy by myself. EGD revealed esophageal candidiiasis, antral 

erosions and moderate duodenitis but no bleeding. Colonoscopy revealed moderate 

diverticulosis universally with retained blood. She had a right colon polyp 

removed by Dr Nelson in .  She had a partial colectomy and 

temporary colostomy for a colovaginal fistula and is left with incisional 

hernias.  Her mother  of rectal cancer. Stool specimen taken from ER was 

negative for occult blood. Iron studies in  were normal except for a low 

TIBC.  While the H&P gives a history of dark stool, she denies melena.  








- History Source


History Provided By: Patient, Medical Record





- Past Medical History


CNS: Yes: Other (chronic headache with negative temporal artery biopsy)


Cardio/Vascular: Yes: CHF, HTN, Hyperlipdemia


Pulmonary: Yes: COPD


Gastrointestinal: Yes: Constipation, Diverticulosis, Gastritis, GI Bleed, 

Hemorrhoids, Other (colon polyp , ventral incisional hernias, esophageal 

candidiasis 2016)


Renal/: Yes: Renal Inusuff (diabetic nephropathy)


Endocrine: Yes: Diabetes Mellitus, Other (diabetic retinopathy and nephropathy)





- Past Surgical History


Past Surgical History: Yes: Appendectomy, Breast Biopsy, Cataract Removal, 

Colonoscopy (diverticular bleed 2016, right colon polyp removed remotely ), 

Hernia Repair, Hysterectomy, Oopherectomy, Tonsillectomy, Upper Endoscopy (2016 

with esophageal candidiasis, gastritis and duodenitis)





- Alcohol/Substance Use


Hx Alcohol Use: No


History of Substance Use: reports: None





- Smoking History


Smoking history: Never smoked


Have you smoked in the past 12 months: No


Aproximately how many cigarettes per day: 0





- Social History


Usual Living Arrangement: Alone


ADL: Independent


Occupation: retired 


Place of Birth: [place]


History of Recent Travel: No





Home Medications





- Allergies


Allergies/Adverse Reactions: 


 Allergies











Allergy/AdvReac Type Severity Reaction Status Date / Time


 


diphenhydramine HCl Allergy Severe anaphyl Verified 18 15:41





[From Benadryl]     


 


aspirin Allergy Unknown Swelling Verified 18 15:41


 


natty flavor AdvReac Severe Swelling Verified 18 15:41














- Home Medications


Home Medications: 


Ambulatory Orders





Allopurinol [Zyloprim -] 100 mg PO DAILY 14 


Budesonide/Formeterol Fumarate [SYMBICORT 160/4.5mcg -] 2 inh PO BID 14 


Gabapentin 200 mg PO TID 14 


Glipizide 5 mg PO DAILY 17 


Furosemide [Lasix -] 100 mg PO DAILY 17 


Valsartan 320 mg PO DAILY 18 


Pantoprazole Sodium [Protonix] 40 mg PO DAILY #30 tablet. 18 


Polyethylene Glycol 3350 [Miralax 119 gm Btl -] 17 gm PO BID  bottle 18 











Family Disease History





- Family Disease History


Family Disease History: Heart Disease: Father ( MI in his 50's), CA: Mother 

(rectal cancer)





Physical Exam-GI


Vital Signs: 


 Vital Signs











Temperature  98.0 F   18 05:36


 


Pulse Rate  51 L  18 05:36


 


Respiratory Rate  20   18 05:36


 


Blood Pressure  152/54   18 05:36


 


O2 Sat by Pulse Oximetry (%)  96   18 20:44











Constitutional: Yes: Calm


Eyes: Yes: Other (+ Arcus senilis).  No: Sclera Icterus


Cardiovascular: Yes: Bradycardia.  No: Murmur


Respiratory: Yes: CTA Bilaterally


Gastrointestinal Inspection: Yes: Scars (Multiple abdominal surgical scars)


...Auscultate: Yes: Normoactive Bowel Sounds


...Palpate: No: Tenderness


...Percussion: No: Tympanitic


...Rectal Exam: Yes: Other (No external lesions, no masses, light brown stool 

guaiac negative)


Edema: Yes


Edema: LLE: 2+, RLE: 2+


Neurological: Yes: Alert, Oriented


Labs: 


 CBC, BMP





 18 05:25 





 18 05:25 





 INR, PTT











INR  1.17  (0.82-1.09)  H  18  16:30    








 Hepatic Panel











Total Bilirubin  0.3 mg/dL (0.2-1.0)  D 18  16:30    


 


AST  18 U/L (15-37)   18  16:30    


 


ALT  18 U/L (12-78)   18  16:30    


 


Alkaline Phosphatase  106 U/L ()   18  16:30    


 


Albumin  3.2 g/dl (3.4-5.0)  L  18  16:30    














Problem List





- Problems


(1) Anemia


Assessment/Plan: 


Currently guaiac negative x 2 with recent GI evaluation that failed to reveal 

clear cause of bleeding.  Her description of the bleeding in April was more in 

line with hemorrhoidal bleeding as it occurred only with strained bowel 

movements, was sometimes only seen on the toilet paper and was bright red.  She 

has normal iron indices as well.


I would advise:


Hematology evaluation to assess for concomitant causes of anemia aside from GI 

blood loss


Neuro evaluation to evaluate ? vertigo


? if her bradycardia playing a role in dizziness. Consider cardiology evaluation


Renal evaluation





Code(s): D64.9 - ANEMIA, UNSPECIFIED   


Qualifiers: 


   Anemia type: unspecified type   Qualified Code(s): D64.9 - Anemia, 

unspecified   





(2) Constipation


Assessment/Plan: 


Describes both hard stool at times along with strained soft bowel movements.


Advise:


Metamucil 5.56g once daily to help bulk up stool


MiraLAX 17g every other day


Code(s): K59.00 - CONSTIPATION, UNSPECIFIED   


Qualifiers: 


   Constipation type: unspecified constipation type   Qualified Code(s): K59.00 

- Constipation, unspecified

## 2018-05-07 NOTE — CONSULT
Consult - text type





- Consultation


Consultation Note: 





Renal Consult for PATRIZIA on CKD





This is a 87 year old woman with PMhx of CKD (Last Cr in our office was 1.6 in 

2014), DM, COPD, Hypertension, Diverticulosis, DM who presented from home with 

complaints of weakness and dizziness and found to have acute on chronic anemia 

and PATRIZIA. Pt states that her PMD has told her that her kidney function has been 

worsening and that she may require dialysis at some point. Pt denies any 

consistent NSAID use, on recent abx use, no contrast exposure. Denies any 

diarrhea. No N/V. No Abd pain. Denies any difficulty with urination or 

hematuria. Pt was on Arb and diuretics at home. 





PMhx: as above


Allergies: NKDA


Family hx: NC


Social hx: No T/A/D


ROS: as per HPI


 Home Medications











 Medication  Instructions  Recorded


 


Allopurinol [Zyloprim -] 100 mg PO DAILY 07/19/14


 


Budesonide/Formeterol Fumarate 2 inh PO BID 07/19/14





[SYMBICORT 160/4.5mcg -]  


 


Gabapentin 200 mg PO TID 07/19/14


 


Glipizide 5 mg PO DAILY 09/09/17


 


Furosemide [Lasix -] 100 mg PO DAILY 11/02/17


 


Valsartan 320 mg PO DAILY 02/06/18


 


Pantoprazole Sodium [Protonix] 40 mg PO DAILY #30 tablet. 02/19/18


 


Polyethylene Glycol 3350 [Miralax 17 gm PO BID  bottle 02/19/18





119 gm Btl -]  








 Vital Signs











Temperature  98.0 F   05/07/18 05:36


 


Pulse Rate  51 L  05/07/18 05:36


 


Respiratory Rate  20   05/07/18 05:36


 


Blood Pressure  152/54   05/07/18 05:36


 


O2 Sat by Pulse Oximetry (%)  96   05/06/18 20:44








 Intake & Output











 05/04/18 05/05/18 05/06/18 05/07/18





 23:59 23:59 23:59 23:59


 


Intake Total    500


 


Balance    500


 


Weight   87.713 kg 





NAD on RA


MMM, No JVD


RRR, No M/R


CTA, no rales or wheeze


soft NT/ND


1+ LE edema at the ankles, no cyanosis or clubbing 


No bladder distension 


 CBC, BMP





 05/07/18 05:25 





 05/07/18 05:25 





 Laboratory Tests











  05/06/18 05/06/18 05/07/18





  19:48 21:40 05:25


 


Neutrophils %    72.0


 


Eosinophils %    1.7


 


Calcium   


 


Phosphorus   


 


Magnesium   


 


Urine pH  6.0  


 


Urine Protein  Negative  


 


Urine Blood  Negative  


 


Stool Occult Blood   Negative 














  05/07/18





  05:25


 


Neutrophils % 


 


Eosinophils % 


 


Calcium  9.2


 


Phosphorus  5.3 H


 


Magnesium  2.3


 


Urine pH 


 


Urine Protein 


 


Urine Blood 


 


Stool Occult Blood 





 Current Medications





Allopurinol (Zyloprim -)  100 mg PO DAILY [facility]


   Last Admin: 05/07/18 09:49 Dose:  100 mg


Budesonide/Formoterol Fumarate (Symbicort 160/4.5mcg -)  2 puff IH BID [facility]


   Last Admin: 05/07/18 09:49 Dose:  2 puff


Gabapentin (Neurontin -)  200 mg PO TID [facility]


   Last Admin: 05/07/18 06:23 Dose:  200 mg


Insulin Aspart (Novolog Vial Sliding Scale -)  1 vial SQ HS [facility]


   PRN Reason: Protocol


   Last Admin: 05/06/18 22:22 Dose:  Not Given


Insulin Aspart (Novolog Vial Sliding Scale -)  1 vial SQ TIDAC [facility]


   PRN Reason: Protocol


   Last Admin: 05/07/18 11:34 Dose:  Not Given


Meclizine HCl (Antivert -)  25 mg PO TID [facility]


Polyethylene Glycol (Miralax (For Daily Use) -)  17 gm PO BID [facility]


   Last Admin: 05/07/18 09:52 Dose:  17 gm


Psyllium Hydrophilic Mucilloid (Metamucil (Sugar-Free) -)  5.85 gm PO DAILY [facility]


   Last Admin: 05/07/18 11:36 Dose:  5.85 gm


Valsartan (Diovan -)  320 mg PO DAILY [facility]


   Last Admin: 05/07/18 09:49 Dose:  320 mg








87 year old woman with PMhx of CKD (Last Cr in our office was 1.6 in 2014), DM, 

COPD, Hypertension, Diverticulosis, DM who presented from home with complaints 

of weakness and dizziness and found to have acute on chronic anemia and PATRIZIA.





#PATRIZIA on CKD vs. progressive CKD (differential includes intravascular volume 

depletion vs. normotensive ATN vs. AIN)


#Acute on Chronic Anenemia


#Hypertensoin 


#DM not on insulin 





Check Urine studies for FeUrea, FeNa, Urine Eos 


check US of the kidney to access kidney size and texture 


will need to obtain recent labs from PMD office. 


Trial of IVF: 1/2 NS at 83cc per hour x 24 hours (hypotonic saline b/c of 

hypertension)


Transfuse PRBC as per primary


agree with holding Valsartran for now (received am dose today)


hold Lasix for now 


Goal BP < 140/90


Check uric acid levels 


GI following 


continue insulin sliding scale 





Thank you 


Will follow





Rodrigo Wyatt DO

## 2018-05-07 NOTE — PN
Progress Note, Physician


History of Present Illness: 





pt seen/ examined.


 chart reviewed


sitting in bed


says dizziness +  but better


no distress


no further bleeding 


gi consult noted/ appreciated-- will discuss


Pt was treated for Candida eosophagitis.


Had extensive work up for bradycardia last visit.


worsening renal function .


will hold diovan














- Current Medication List


Current Medications: 


Active Medications





Allopurinol (Zyloprim -)  100 mg PO DAILY [facility]


   Last Admin: 05/07/18 09:49 Dose:  100 mg


Budesonide/Formoterol Fumarate (Symbicort 160/4.5mcg -)  2 puff IH BID [facility]


   Last Admin: 05/07/18 09:49 Dose:  2 puff


Gabapentin (Neurontin -)  200 mg PO TID [facility]


   Last Admin: 05/07/18 06:23 Dose:  200 mg


Insulin Aspart (Novolog Vial Sliding Scale -)  1 vial SQ HS [facility]


   PRN Reason: Protocol


   Last Admin: 05/06/18 22:22 Dose:  Not Given


Insulin Aspart (Novolog Vial Sliding Scale -)  1 vial SQ TIDAC [facility]


   PRN Reason: Protocol


   Last Admin: 05/07/18 06:24 Dose:  Not Given


Polyethylene Glycol (Miralax (For Daily Use) -)  17 gm PO BID [facility]


   Last Admin: 05/07/18 09:52 Dose:  17 gm


Psyllium Hydrophilic Mucilloid (Metamucil (Sugar-Free) -)  5.85 gm PO DAILY [facility]


Valsartan (Diovan -)  320 mg PO DAILY [facility]


   Last Admin: 05/07/18 09:49 Dose:  320 mg











- Objective


Vital Signs: 


 Vital Signs











Temperature  98.0 F   05/07/18 05:36


 


Pulse Rate  51 L  05/07/18 05:36


 


Respiratory Rate  20   05/07/18 05:36


 


Blood Pressure  152/54   05/07/18 05:36


 


O2 Sat by Pulse Oximetry (%)  96   05/06/18 20:44











Constitutional: Yes: No Distress, Calm, Obese


Eyes: Yes: Conjunctiva Clear


Neck: Yes: Supple


Cardiovascular: Yes: Regular Rate and Rhythm


Respiratory: Yes: Diminished


Gastrointestinal: Yes: Soft, Abdomen, Obese


Edema: LLE: 1+, RLE: 1+


Neurological: Yes: Alert


Labs: 


 CBC, BMP





 05/07/18 05:25 





 05/07/18 05:25 





 INR, PTT











INR  1.17  (0.82-1.09)  H  05/06/18  16:30    














Problem List





- Problems


(1) Dizziness


Code(s): R42 - DIZZINESS AND GIDDINESS   





(2) Acute on chronic renal failure


Code(s): N17.9 - ACUTE KIDNEY FAILURE, UNSPECIFIED; N18.9 - CHRONIC KIDNEY 

DISEASE, UNSPECIFIED   





(3) Bradycardia


Code(s): R00.1 - BRADYCARDIA, UNSPECIFIED   





(4) Diabetes mellitus


Code(s): E11.9 - TYPE 2 DIABETES MELLITUS WITHOUT COMPLICATIONS   


Qualifiers: 


   Diabetes mellitus type: type 2 





(5) Diverticulosis


Code(s): K57.90 - DVRTCLOS OF INTEST, PART UNSP, W/O PERF OR ABSCESS W/O BLEED 

  


Qualifiers: 


   Diverticulosis site: diverticulosis of large intestine   Diverticulosis 

bleeding: diverticulosis with bleeding   Qualified Code(s): K57.31 - 

Diverticulosis of large intestine without perforation or abscess with bleeding 

  





(6) Family history of colon cancer in mother


Code(s): Z80.0 - FAMILY HISTORY OF MALIGNANT NEOPLASM OF DIGESTIVE ORGANS   





(7) Gastrointestinal hemorrhage


Code(s): K92.2 - GASTROINTESTINAL HEMORRHAGE, UNSPECIFIED   


Qualifiers: 


   GI bleed type/associated pathology: diverticulosis   Qualified Code(s): 

K57.91 - Diverticulosis of intestine, part unspecified, without perforation or 

abscess with bleeding   





(8) Hypertension


Code(s): I10 - ESSENTIAL (PRIMARY) HYPERTENSION   


Qualifiers: 


   Hypertension type: essential hypertension   Qualified Code(s): I10 - 

Essential (primary) hypertension   





(9) Lower GI bleeding


Code(s): K92.2 - GASTROINTESTINAL HEMORRHAGE, UNSPECIFIED   





Assessment/Plan





Discussed with pt


Transfuse prn.


d/c diovan.


cardiology/ renal consults 


continue meclizine


Neuro exam non focal


ct head -ve .


will follow.

## 2018-05-07 NOTE — EKG
Test Reason : 

Blood Pressure : ***/*** mmHG

Vent. Rate : 058 BPM     Atrial Rate : 058 BPM

   P-R Int : 136 ms          QRS Dur : 084 ms

    QT Int : 438 ms       P-R-T Axes : 065 -44 030 degrees

   QTc Int : 429 ms

 

*** POOR DATA QUALITY, INTERPRETATION MAY BE ADVERSELY AFFECTED

SINUS BRADYCARDIA WITH PREMATURE ATRIAL COMPLEXES

LEFT AXIS DEVIATION

ABNORMAL ECG

WHEN COMPARED WITH ECG OF 10-FEB-2018 11:38,

PREMATURE ATRIAL COMPLEXES ARE NOW PRESENT

NONSPECIFIC T WAVE ABNORMALITY NO LONGER EVIDENT IN LATERAL LEADS

Confirmed by TALISHA MONTALVO MD ([id number]) on 5/7/2018 10:45:19 AM

 

Referred By:             Confirmed By:TALISHA MONTALVO MD

## 2018-05-07 NOTE — CON.CARD
Cardiology Consult (text)





- Consultation


Consultation Note: 








CC: dizziness





88 yo with h/o HTN, hl, dchf vs venous insufficiency, DM, CKD, COPD, anemia, 

hyperkalemia,  gout, diverticulosis/gastritis/gib, who p/w dizziness.   


 


Describes two dizziness sx's.  One is her typical vertigo which she experiences 

when she lies down at night in bed.  The other is a new sensation of 

instability with walking, mostly due to weakness.  Weakness/instability, just 

started in the past couple of days.  mild sob. 





States she has had to recent episodes of blood in stool.  





s/p 500 cc IVF.  





chronic le edema, currently improved.  States she was taking 100 mg of lasix 

daily at home (her prior po regimen).  





no orthopnea, pnd, cp, palps.  


no f/c/s, n/v/d, cough, congestion, visual disturbances, h/a, rash





cards: Dr. Torrez





pmhx/pshx: per phi:  Appendectomy, Breast Biopsy, Cataract Removal, Colonoscopy 

(diverticular bleed 2016, right colon polyp removed remotely ), Hernia Repair, 

Hysterectomy, Oopherectomy, Tonsillectomy, Upper Endoscopy (2016 with 

esophageal candidiasis, gastritis and duodenitis)


social hx:   Never smoked


fam hx: no premature cad


ros: per hpi





Ambulatory Orders





Allopurinol [Zyloprim -] 100 mg PO DAILY 07/19/14 


Budesonide/Formeterol Fumarate [SYMBICORT 160/4.5mcg -] 2 inh PO BID 07/19/14 


Gabapentin 200 mg PO TID 07/19/14 


Glipizide 5 mg PO DAILY 09/09/17 


Furosemide [Lasix -] 100 mg PO DAILY 11/02/17 


Valsartan 320 mg PO DAILY 02/06/18 


Pantoprazole Sodium [Protonix] 40 mg PO DAILY #30 tablet. 02/19/18 


Polyethylene Glycol 3350 [Miralax 119 gm Btl -] 17 gm PO BID  bottle 02/19/18 




















 Current Medications





Allopurinol (Zyloprim -)  100 mg PO DAILY [facility]


   Last Admin: 05/07/18 09:49 Dose:  100 mg


Budesonide/Formoterol Fumarate (Symbicort 160/4.5mcg -)  2 puff IH BID [facility]


   Last Admin: 05/07/18 09:49 Dose:  2 puff


Gabapentin (Neurontin -)  200 mg PO TID [facility]


   Last Admin: 05/07/18 14:33 Dose:  200 mg


Insulin Aspart (Novolog Vial Sliding Scale -)  1 vial SQ HS [facility]


   PRN Reason: Protocol


   Last Admin: 05/06/18 22:22 Dose:  Not Given


Insulin Aspart (Novolog Vial Sliding Scale -)  1 vial SQ TIDAC [facility]


   PRN Reason: Protocol


   Last Admin: 05/07/18 11:34 Dose:  Not Given


Meclizine HCl (Antivert -)  25 mg PO TID [facility]


   Last Admin: 05/07/18 14:33 Dose:  25 mg


Polyethylene Glycol (Miralax (For Daily Use) -)  17 gm PO BID [facility]


   Last Admin: 05/07/18 09:52 Dose:  17 gm


Psyllium Hydrophilic Mucilloid (Metamucil (Sugar-Free) -)  5.85 gm PO DAILY [facility]


   Last Admin: 05/07/18 11:36 Dose:  5.85 gm


Tramadol HCl (Ultram -)  50 mg PO Q8H PRN


   PRN Reason: PAIN LEVEL 4 - 10


Valsartan (Diovan -)  320 mg PO DAILY [facility]


   Last Admin: 05/07/18 09:49 Dose:  320 mg








 Vital Signs - 24 hr











  05/06/18 05/06/18 05/06/18





  17:00 20:44 22:00


 


Temperature  98.2 F 98.4 F


 


Pulse Rate   60


 


Pulse Rate [ 62 58 L 





Apical]   


 


Respiratory 17 17 20





Rate   


 


Blood Pressure   142/44


 


Blood Pressure 188/65 147/50 





[Right Arm]   


 


O2 Sat by Pulse 99 96 





Oximetry (%)   














  05/07/18 05/07/18 05/07/18





  05:36 07:00 07:01


 


Temperature 98.0 F 98.2 F 


 


Pulse Rate 51 L 55 L 


 


Pulse Rate [   





Apical]   


 


Respiratory 20 18 





Rate   


 


Blood Pressure 152/54 136/54 


 


Blood Pressure   





[Right Arm]   


 


O2 Sat by Pulse   97





Oximetry (%)   














  05/07/18 05/07/18 05/07/18





  09:00 15:00 15:17


 


Temperature 98.4 F  97.9 F


 


Pulse Rate 55 L  50 L


 


Pulse Rate [   





Apical]   


 


Respiratory 18  18





Rate   


 


Blood Pressure 121/51  136/56


 


Blood Pressure   





[Right Arm]   


 


O2 Sat by Pulse  97 





Oximetry (%)   














 Intake & Output











 05/05/18 05/06/18 05/07/18 05/08/18





 07:59 07:59 07:59 07:59


 


Intake Total   200 700


 


Balance   200 700


 


Weight   193 lb 6 oz 

















nad, calm


jvd flat, neck supple


ctab, nl effort


rrr nl s1, s2 no mrg


+ bs soft nt nd, obese


trace le edema.  no c/c


+ dp/pt, no carotid bruits


aaox3


no jaundice, diaphoresis








 





 CBC, BMP





 05/07/18 05:25 





 05/07/18 05:25 











 Laboratory Tests











  05/06/18 05/06/18 05/06/18





  16:30 16:30 16:30


 


Hgb    7.5 L D


 


INR  1.17 H  


 


Magnesium   


 


Total Bilirubin   0.3  D 


 


AST   18 


 


ALT   18 


 


Alkaline Phosphatase   106 


 


Creatine Kinase   


 


Troponin I   


 


Albumin   3.2 L 


 


Stool Occult Blood   














  05/06/18 05/06/18 05/07/18





  16:30 21:40 05:25


 


Hgb   


 


INR   


 


Magnesium    2.3


 


Total Bilirubin   


 


AST   


 


ALT   


 


Alkaline Phosphatase   


 


Creatine Kinase  114  


 


Troponin I  < 0.02  


 


Albumin   


 


Stool Occult Blood   Negative 














 


ekg:  baseline artifact.  sb, 58 bpm with pac's.  lad.  early r wave progression

, similar to priors.  


cxr: no congestion/infiltrates. 





head ct: wnl





echo 12/2017 :nl lv/rv size/fn.  impaired relaxation.  1+ lae. nl valves.  nl 

ivc.  no rvsp. 





holter 12/2016: avg HR 56 bpm.  no bradyarrhythmias.  no sig pauses.  1% pvc 

burden.  one run of psvt (4 beats).  





stress test 2/2018: No Ekg changes.  No ischemia.  nl perf/EF. 


stress test 2014: no ekg changes, sx's. bline hr 52 bpm.  nl perfusion/EF. 








A/P


88 yo with h/o HTN, hl, dchf vs venous insufficiency, DM, CKD, COPD, anemia, 

hyperkalemia,  gout, diverticulosis/gastritis/gib and recent admit for GIB who p

/w dizziness.   





dizziness/eakness


- in setting of recurrent GIB.  Ongoing mgm't  per pmd.  hgb down to 7, has 

plan for transfusion 


- ce neg x 1 yesterday.  EKG without acute ischemic changes.  no signs of acs. 


- recent echo wnl


- check orthostatics, tsh


- no worsening of LE edema.  does not appear volume overloaded.  Agree with 

holding lasix for now.  monitor daily weights, i/o's. 


- tx of vertigo per pmd





bradycardia


- has longstanding hx of bradycardia, asx.  Patient had Holter monitor for 

evaluation of bradycardia 12/2016.   Holter placed here on recent admit --> no 

sig bradyarrhythmias.  


- low suspicion this etiology of dizziness/weakness


  


htn


- agree with holding diovan while with diana.  Can give norvasc 5 mg instead.  





venous insufficiency


- holding lasix as mentioned above


- daily weights, i/o's.  monitor for need to resume home lasix dose 100 mg daily


- ace wraps to LE to prevent worsening LE edema. 





diana


- monitor for improvement with ivf and transfusion.  Ok for ongoing gentle ivf 

if needed post transfusion.  renal following





gib:


- ongoing mgm't per pmd/gi

## 2018-05-08 LAB
ANION GAP SERPL CALC-SCNC: 5 MMOL/L (ref 8–16)
BASOPHILS # BLD: 1.2 % (ref 0–2)
BUN SERPL-MCNC: 49 MG/DL (ref 7–18)
CALCIUM SERPL-MCNC: 8.7 MG/DL (ref 8.5–10.1)
CHLORIDE SERPL-SCNC: 111 MMOL/L (ref 98–107)
CO2 SERPL-SCNC: 27 MMOL/L (ref 21–32)
CREAT SERPL-MCNC: 2.2 MG/DL (ref 0.55–1.02)
DEPRECATED RDW RBC AUTO: 16.4 % (ref 11.6–15.6)
DEPRECATED RDW RBC AUTO: 16.8 % (ref 11.6–15.6)
EOSINOPHIL # BLD: 1.9 % (ref 0–4.5)
GLUCOSE SERPL-MCNC: 86 MG/DL (ref 74–106)
HCT VFR BLD CALC: 24 % (ref 32.4–45.2)
HCT VFR BLD CALC: 25.6 % (ref 32.4–45.2)
HGB BLD-MCNC: 8 GM/DL (ref 10.7–15.3)
HGB BLD-MCNC: 8.6 GM/DL (ref 10.7–15.3)
LYMPHOCYTES # BLD: 15.7 % (ref 8–40)
MAGNESIUM SERPL-MCNC: 2.4 MG/DL (ref 1.8–2.4)
MCH RBC QN AUTO: 29.6 PG (ref 25.7–33.7)
MCH RBC QN AUTO: 29.9 PG (ref 25.7–33.7)
MCHC RBC AUTO-ENTMCNC: 33.4 G/DL (ref 32–36)
MCHC RBC AUTO-ENTMCNC: 33.6 G/DL (ref 32–36)
MCV RBC: 88.6 FL (ref 80–96)
MCV RBC: 89.2 FL (ref 80–96)
MONOCYTES # BLD AUTO: 9.7 % (ref 3.8–10.2)
NEUTROPHILS # BLD: 71.5 % (ref 42.8–82.8)
PHOSPHATE SERPL-MCNC: 4.6 MG/DL (ref 2.5–4.9)
PLATELET # BLD AUTO: 220 K/MM3 (ref 134–434)
PLATELET # BLD AUTO: 229 K/MM3 (ref 134–434)
PMV BLD: 9.4 FL (ref 7.5–11.1)
PMV BLD: 9.4 FL (ref 7.5–11.1)
POTASSIUM SERPLBLD-SCNC: 5 MMOL/L (ref 3.5–5.1)
RBC # BLD AUTO: 2.71 M/MM3 (ref 3.6–5.2)
RBC # BLD AUTO: 2.87 M/MM3 (ref 3.6–5.2)
SODIUM SERPL-SCNC: 143 MMOL/L (ref 136–145)
WBC # BLD AUTO: 6.6 K/MM3 (ref 4–10)
WBC # BLD AUTO: 7.1 K/MM3 (ref 4–10)

## 2018-05-08 RX ADMIN — INSULIN ASPART SCH: 100 INJECTION, SOLUTION INTRAVENOUS; SUBCUTANEOUS at 21:35

## 2018-05-08 RX ADMIN — GABAPENTIN SCH MG: 100 CAPSULE ORAL at 14:53

## 2018-05-08 RX ADMIN — INSULIN ASPART SCH: 100 INJECTION, SOLUTION INTRAVENOUS; SUBCUTANEOUS at 17:55

## 2018-05-08 RX ADMIN — POLYETHYLENE GLYCOL 3350 SCH: 17 POWDER, FOR SOLUTION ORAL at 21:35

## 2018-05-08 RX ADMIN — POLYETHYLENE GLYCOL 3350 SCH GM: 17 POWDER, FOR SOLUTION ORAL at 10:06

## 2018-05-08 RX ADMIN — ALLOPURINOL SCH MG: 100 TABLET ORAL at 10:04

## 2018-05-08 RX ADMIN — BUDESONIDE AND FORMOTEROL FUMARATE DIHYDRATE SCH PUFF: 160; 4.5 AEROSOL RESPIRATORY (INHALATION) at 21:34

## 2018-05-08 RX ADMIN — GABAPENTIN SCH MG: 100 CAPSULE ORAL at 21:33

## 2018-05-08 RX ADMIN — BUDESONIDE AND FORMOTEROL FUMARATE DIHYDRATE SCH PUFF: 160; 4.5 AEROSOL RESPIRATORY (INHALATION) at 10:09

## 2018-05-08 RX ADMIN — AMLODIPINE BESYLATE SCH MG: 5 TABLET ORAL at 10:04

## 2018-05-08 RX ADMIN — INSULIN ASPART SCH: 100 INJECTION, SOLUTION INTRAVENOUS; SUBCUTANEOUS at 06:30

## 2018-05-08 RX ADMIN — INSULIN ASPART SCH: 100 INJECTION, SOLUTION INTRAVENOUS; SUBCUTANEOUS at 12:47

## 2018-05-08 RX ADMIN — MECLIZINE HYDROCHLORIDE SCH MG: 25 TABLET ORAL at 05:44

## 2018-05-08 RX ADMIN — GABAPENTIN SCH MG: 100 CAPSULE ORAL at 05:44

## 2018-05-08 RX ADMIN — PSYLLIUM HUSK SCH GM: 3.4 POWDER ORAL at 10:05

## 2018-05-08 NOTE — PN
Progress Note (short form)





- Note


Progress Note: 





Renal follow up for PATRIZIA/CKD





Pt seen and examined at the bedside


awake and alert


no acute complaints


feels better


s/p PRBC and IVF


making urine 





 Vital Signs











Temperature  98.7 F   05/08/18 14:00


 


Pulse Rate  54 L  05/08/18 14:00


 


Respiratory Rate  18   05/08/18 06:00


 


Blood Pressure  148/55   05/08/18 14:00


 


O2 Sat by Pulse Oximetry (%)  96   05/08/18 15:00








 Intake & Output











 05/05/18 05/06/18 05/07/18 05/08/18





 23:59 23:59 23:59 23:59


 


Intake Total   1500 720


 


Balance   1500 720


 


Weight  87.713 kg  








NAD on RA


MMM, No JVD


RRR, No M/R


CTA, no rales or wheeze


soft NT/ND


1+ LE edema at the ankles, no cyanosis or clubbing 


No bladder distension 


 CBC, BMP





 05/07/18 05:25 





 05/07/18 05:25 





 Laboratory Tests











  05/06/18 05/06/18 05/07/18





  19:48 21:40 05:25


 


Neutrophils %    72.0


 


Eosinophils %    1.7


 


Calcium   


 


Phosphorus   


 


Magnesium   


 


Urine pH  6.0  


 


Urine Protein  Negative  


 


Urine Blood  Negative  


 


Stool Occult Blood   Negative 














  05/07/18





  05:25


 


Neutrophils % 


 


Eosinophils % 


 


Calcium  9.2


 


Phosphorus  5.3 H


 


Magnesium  2.3


 


Urine pH 


 


Urine Protein 


 


Urine Blood 


 


Stool Occult Blood 





 Current Medications





Allopurinol (Zyloprim -)  100 mg PO DAILY [facility]


   Last Admin: 05/07/18 09:49 Dose:  100 mg


Budesonide/Formoterol Fumarate (Symbicort 160/4.5mcg -)  2 puff IH BID [facility]


   Last Admin: 05/07/18 09:49 Dose:  2 puff


Gabapentin (Neurontin -)  200 mg PO TID [facility]


   Last Admin: 05/07/18 06:23 Dose:  200 mg


Insulin Aspart (Novolog Vial Sliding Scale -)  1 vial SQ HS [facility]


   PRN Reason: Protocol


   Last Admin: 05/06/18 22:22 Dose:  Not Given


Insulin Aspart (Novolog Vial Sliding Scale -)  1 vial SQ TIDAC [facility]


   PRN Reason: Protocol


   Last Admin: 05/07/18 11:34 Dose:  Not Given


Meclizine HCl (Antivert -)  25 mg PO TID [facility]


Polyethylene Glycol (Miralax (For Daily Use) -)  17 gm PO BID [facility]


   Last Admin: 05/07/18 09:52 Dose:  17 gm


Psyllium Hydrophilic Mucilloid (Metamucil (Sugar-Free) -)  5.85 gm PO DAILY [facility]


   Last Admin: 05/07/18 11:36 Dose:  5.85 gm


Valsartan (Diovan -)  320 mg PO DAILY [facility]


   Last Admin: 05/07/18 09:49 Dose:  320 mg








87 year old woman with PMhx of CKD (Last Cr in our office was 1.6 in 2014), DM, 

COPD, Hypertension, Diverticulosis, DM who presented from home with complaints 

of weakness and dizziness and found to have acute on chronic anemia and PATRIZIA.





#PATRIZIA on CKD vs. progressive CKD (differential includes intravascular volume 

depletion vs. normotensive ATN vs. AIN)


#Acute on Chronic Anenemia


#Hypertensoin 


#DM not on insulin 


 





Renal function improving with IVF


tolerating good oral diet so no need for further IVF 


Urine studies consisent with pre-renal injury and w/o signifincat proteinuria 


US shows small kidneys consistent with CKD


s/p PRBC transfusion 


Trend Renal function, electrolytes and CBC


hold Lasix for now 


hold Valsartan for now 





Thank you 


Will follow





Rodrigo Wyatt DO

## 2018-05-08 NOTE — PN
Progress Note, Physician


Chief Complaint: 





no bleeding


felt dizzy, but better after blood transfusion





- Current Medication List


Current Medications: 


Active Medications





Allopurinol (Zyloprim -)  100 mg PO DAILY [facility]


   Last Admin: 05/08/18 10:04 Dose:  100 mg


Amlodipine Besylate (Norvasc -)  5 mg PO DAILY [facility]


   Last Admin: 05/08/18 10:04 Dose:  5 mg


Budesonide/Formoterol Fumarate (Symbicort 160/4.5mcg -)  2 puff IH BID [facility]


   Last Admin: 05/08/18 10:09 Dose:  2 puff


Gabapentin (Neurontin -)  200 mg PO TID [facility]


   Last Admin: 05/08/18 05:44 Dose:  200 mg


Insulin Aspart (Novolog Vial Sliding Scale -)  1 vial SQ HS [facility]


   PRN Reason: Protocol


   Last Admin: 05/07/18 22:27 Dose:  Not Given


Insulin Aspart (Novolog Vial Sliding Scale -)  1 vial SQ TIDAC [facility]


   PRN Reason: Protocol


   Last Admin: 05/08/18 12:47 Dose:  Not Given


Meclizine HCl (Antivert -)  25 mg PO TID [facility]


   Last Admin: 05/08/18 05:44 Dose:  25 mg


Polyethylene Glycol (Miralax (For Daily Use) -)  17 gm PO BID [facility]


   Last Admin: 05/08/18 10:06 Dose:  17 gm


Psyllium Hydrophilic Mucilloid (Metamucil (Sugar-Free) -)  5.85 gm PO DAILY [facility]


   Last Admin: 05/08/18 10:05 Dose:  5.85 gm


Tramadol HCl (Ultram -)  50 mg PO Q8H [facility]N


   PRN Reason: PAIN LEVEL 4 - 10


   Last Admin: 05/07/18 17:09 Dose:  50 mg











- Objective


Vital Signs: 


 Vital Signs











Temperature  98.3 F   05/08/18 06:00


 


Pulse Rate  53 L  05/08/18 06:00


 


Respiratory Rate  18   05/08/18 06:00


 


Blood Pressure  147/62   05/08/18 06:00


 


O2 Sat by Pulse Oximetry (%)  97   05/07/18 23:00











Constitutional: Yes: No Distress, Calm


Cardiovascular: Yes: Regular Rate and Rhythm, Murmur


Respiratory: Yes: Diminished.  No: Rales, Rhonchi


Gastrointestinal: Yes: Normal Bowel Sounds, Soft, Abdomen, Obese.  No: 

Tenderness


Edema: Yes (dependent edema)


Edema: LLE: 2+, RLE: 2+


Labs: 


 CBC, BMP





 05/07/18 05:25 





 05/08/18 07:00 





 INR, PTT











INR  1.17  (0.82-1.09)  H  05/06/18  16:30    














Problem List





- Problems


(1) PATRIZIA (acute kidney injury)


Code(s): N17.9 - ACUTE KIDNEY FAILURE, UNSPECIFIED   





(2) Dizziness


Code(s): R42 - DIZZINESS AND GIDDINESS   





(3) Vertigo


Code(s): R42 - DIZZINESS AND GIDDINESS   





(4) (HFpEF) heart failure with preserved ejection fraction


Code(s): I50.9 - HEART FAILURE, UNSPECIFIED   





Assessment/Plan





PLAN


s/p 1 unit PRBC


Still feels slight dizziness-- maybe vertigo


Check CBC-- may need one more unit


continue with meds


renal function better

## 2018-05-09 LAB
ANION GAP SERPL CALC-SCNC: 6 MMOL/L (ref 8–16)
BASOPHILS # BLD: 1 % (ref 0–2)
BUN SERPL-MCNC: 51 MG/DL (ref 7–18)
CALCIUM SERPL-MCNC: 9.1 MG/DL (ref 8.5–10.1)
CHLORIDE SERPL-SCNC: 109 MMOL/L (ref 98–107)
CO2 SERPL-SCNC: 27 MMOL/L (ref 21–32)
CREAT SERPL-MCNC: 2.2 MG/DL (ref 0.55–1.02)
DEPRECATED RDW RBC AUTO: 16.3 % (ref 11.6–15.6)
EOSINOPHIL # BLD: 1.9 % (ref 0–4.5)
GLUCOSE SERPL-MCNC: 78 MG/DL (ref 74–106)
HCT VFR BLD CALC: 30.2 % (ref 32.4–45.2)
HGB BLD-MCNC: 10 GM/DL (ref 10.7–15.3)
LYMPHOCYTES # BLD: 19 % (ref 8–40)
MAGNESIUM SERPL-MCNC: 2.4 MG/DL (ref 1.8–2.4)
MCH RBC QN AUTO: 29.3 PG (ref 25.7–33.7)
MCHC RBC AUTO-ENTMCNC: 33.1 G/DL (ref 32–36)
MCV RBC: 88.5 FL (ref 80–96)
MONOCYTES # BLD AUTO: 8.8 % (ref 3.8–10.2)
NEUTROPHILS # BLD: 69.3 % (ref 42.8–82.8)
PHOSPHATE SERPL-MCNC: 4 MG/DL (ref 2.5–4.9)
PLATELET # BLD AUTO: 218 K/MM3 (ref 134–434)
PMV BLD: 9 FL (ref 7.5–11.1)
POTASSIUM SERPLBLD-SCNC: 5.5 MMOL/L (ref 3.5–5.1)
RBC # BLD AUTO: 3.41 M/MM3 (ref 3.6–5.2)
SERUM IRON SATURATION: 18 % (ref 15–55)
SODIUM SERPL-SCNC: 142 MMOL/L (ref 136–145)
TIBC SERPL-MCNC: 175 UG/DL (ref 250–450)
UIBC SERPL-MCNC: 144 UG/DL (ref 118–369)
WBC # BLD AUTO: 6.2 K/MM3 (ref 4–10)

## 2018-05-09 RX ADMIN — FUROSEMIDE SCH MG: 40 TABLET ORAL at 15:06

## 2018-05-09 RX ADMIN — BUDESONIDE AND FORMOTEROL FUMARATE DIHYDRATE SCH PUFF: 160; 4.5 AEROSOL RESPIRATORY (INHALATION) at 09:35

## 2018-05-09 RX ADMIN — GABAPENTIN SCH MG: 100 CAPSULE ORAL at 22:25

## 2018-05-09 RX ADMIN — POLYETHYLENE GLYCOL 3350 SCH: 17 POWDER, FOR SOLUTION ORAL at 22:00

## 2018-05-09 RX ADMIN — GABAPENTIN SCH MG: 100 CAPSULE ORAL at 15:06

## 2018-05-09 RX ADMIN — INSULIN ASPART SCH: 100 INJECTION, SOLUTION INTRAVENOUS; SUBCUTANEOUS at 06:09

## 2018-05-09 RX ADMIN — POLYETHYLENE GLYCOL 3350 SCH GM: 17 POWDER, FOR SOLUTION ORAL at 09:34

## 2018-05-09 RX ADMIN — INSULIN ASPART SCH: 100 INJECTION, SOLUTION INTRAVENOUS; SUBCUTANEOUS at 22:24

## 2018-05-09 RX ADMIN — GABAPENTIN SCH MG: 100 CAPSULE ORAL at 05:47

## 2018-05-09 RX ADMIN — AMLODIPINE BESYLATE SCH MG: 5 TABLET ORAL at 09:33

## 2018-05-09 RX ADMIN — PSYLLIUM HUSK SCH GM: 3.4 POWDER ORAL at 09:33

## 2018-05-09 RX ADMIN — BUDESONIDE AND FORMOTEROL FUMARATE DIHYDRATE SCH PUFF: 160; 4.5 AEROSOL RESPIRATORY (INHALATION) at 22:25

## 2018-05-09 RX ADMIN — INSULIN ASPART SCH: 100 INJECTION, SOLUTION INTRAVENOUS; SUBCUTANEOUS at 17:48

## 2018-05-09 RX ADMIN — ALLOPURINOL SCH MG: 100 TABLET ORAL at 09:33

## 2018-05-09 RX ADMIN — INSULIN ASPART SCH: 100 INJECTION, SOLUTION INTRAVENOUS; SUBCUTANEOUS at 12:04

## 2018-05-09 NOTE — PN
Progress Note (short form)





- Note


Progress Note: 








CC: dizziness





S:  Lasix 40 mg ordered for today.  s/p another unit of prbc's overnight.  

States that upon getting up this morning she no longer felt dizziness/weakness.

  No recurrence all day even with ambulation.  LE edema stable.  No sob, cp, 

palps. 





 


cards: Dr. Torrez





 


 Current Medications





Acetaminophen (Tylenol -)  650 mg PO Q6H PRN


   PRN Reason: FEVER


   Last Admin: 05/08/18 20:25 Dose:  650 mg


Allopurinol (Zyloprim -)  100 mg PO DAILY [facility]


   Last Admin: 05/09/18 09:33 Dose:  100 mg


Amlodipine Besylate (Norvasc -)  5 mg PO DAILY [facility]


   Last Admin: 05/09/18 09:33 Dose:  5 mg


Budesonide/Formoterol Fumarate (Symbicort 160/4.5mcg -)  2 puff IH BID [facility]


   Last Admin: 05/09/18 09:35 Dose:  2 puff


Furosemide (Lasix -)  40 mg PO DAILY [facility]


Gabapentin (Neurontin -)  200 mg PO TID [facility]


   Last Admin: 05/09/18 05:47 Dose:  200 mg


Iron Sucrose 100 mg/ Sodium (Chloride)  100 mls @ 200 mls/hr IVPB ONCE ONE


   Stop: 05/09/18 14:29


Insulin Aspart (Novolog Vial Sliding Scale -)  1 vial SQ HS [facility]


   PRN Reason: Protocol


   Last Admin: 05/08/18 21:35 Dose:  Not Given


Insulin Aspart (Novolog Vial Sliding Scale -)  1 vial SQ TIDAC [facility]


   PRN Reason: Protocol


   Last Admin: 05/09/18 12:04 Dose:  Not Given


Meclizine HCl (Antivert -)  25 mg PO TID [facility]


   PRN Reason: VERTIGO


Polyethylene Glycol (Miralax (For Daily Use) -)  17 gm PO BID [facility]


   Last Admin: 05/09/18 09:34 Dose:  17 gm


Psyllium Hydrophilic Mucilloid (Metamucil (Sugar-Free) -)  5.85 gm PO DAILY [facility]


   Last Admin: 05/09/18 09:33 Dose:  5.85 gm


Tramadol HCl (Ultram -)  50 mg PO Q8H PRN


   PRN Reason: PAIN LEVEL 4 - 10


   Last Admin: 05/07/18 17:09 Dose:  50 mg








 Vital Signs - 24 hr











  05/08/18 05/08/18 05/08/18





  15:00 16:35 18:00


 


Temperature  98.8 F 98 F


 


Pulse Rate  50 L 56 L


 


Respiratory  20 18





Rate   


 


Blood Pressure  131/50 140/55


 


O2 Sat by Pulse 96  





Oximetry (%)   














  05/08/18 05/08/18 05/08/18





  20:04 21:38 23:00


 


Temperature 100.5 F H 99.1 F 


 


Pulse Rate 58 L  


 


Respiratory 18  18





Rate   


 


Blood Pressure 121/50  


 


O2 Sat by Pulse   96





Oximetry (%)   














  05/09/18





  06:00


 


Temperature 98.2 F


 


Pulse Rate 50 L


 


Respiratory 20





Rate 


 


Blood Pressure 126/52


 


O2 Sat by Pulse 





Oximetry (%) 











 Intake & Output











 05/07/18 05/08/18 05/09/18 05/10/18





 07:59 07:59 07:59 07:59


 


Intake Total 200 1700 1720 


 


Balance 200 1700 1720 


 


Weight 193 lb 6 oz  205 lb 3 oz 














nad, calm


jvd flat, neck supple


ctab, nl effort


rrr nl s1, s2 no mrg


+ bs soft nt nd, obese


trace-1+ le edema.  no c/c


+ dp/pt, no carotid bruits


aaox3


no jaundice, diaphoresis








 





 


 CBC, BMP





 05/09/18 08:08 





 05/09/18 08:08 











 Laboratory Tests











  05/08/18 05/08/18 05/08/18





  06:00 07:00 07:00


 


Magnesium   2.4 


 


Iron    31


 


Ferritin   139.293 


 


TSH  0.85  


 


Stool Occult Blood   














  05/09/18 05/09/18





  08:08 09:50


 


Magnesium  2.4 


 


Iron  


 


Ferritin  


 


TSH  


 


Stool Occult Blood   Negative














 


ekg:  baseline artifact.  sb, 58 bpm with pac's.  lad.  early r wave progression

, similar to priors.  


cxr: no congestion/infiltrates. 





head ct: wnl





echo 12/2017 :nl lv/rv size/fn.  impaired relaxation.  1+ lae. nl valves.  nl 

ivc.  no rvsp. 





holter 12/2016: avg HR 56 bpm.  no bradyarrhythmias.  no sig pauses.  1% pvc 

burden.  one run of psvt (4 beats).  





stress test 2/2018: No Ekg changes.  No ischemia.  nl perf/EF. 


stress test 2014: no ekg changes, sx's. bline hr 52 bpm.  nl perfusion/EF. 








A/P


86 yo with h/o HTN, hl, dchf vs venous insufficiency, DM, CKD, COPD, anemia, 

hyperkalemia,  gout, diverticulosis/gastritis/gib and recent admit for GIB who p

/w dizziness.   





dizziness/weakness


- in setting of recurrent anemia/? GIB.  Ongoing mgm't  per pmd.  hgb down to 7

, s/p prbc's x 2 with resolution of sx's. 


- ce neg x 1 on admit.  EKG without acute ischemic changes.  no signs of acs. 


- recent echo from December wnl


- negative orthostatic vitals. tsh wnl


- tx of vertigo per pmd


- 5/7: s/p IVF and prbc's.  no worsening of LE edema.  does not appear volume 

overloaded.  Agree with holding lasix for now.  


- 5/9: has plan to resume lasix today, see below.  monitor daily weights, i/o'

s. 





bradycardia


- has longstanding hx of bradycardia, asx.  Patient had Holter monitor for 

evaluation of bradycardia 12/2016.   Holter placed here on recent admit --> no 

sig bradyarrhythmias.  


- low suspicion this etiology of dizziness/weakness


  


htn


-  holding diovan while with diana/hyperkalemia.  bp well controlled on norvasc 5 

mg/day.  Will try decreased dose of 2.5 tomorrow to avoid ccb worsening LE 

edema (on 2.5 as outpatient).   





venous insufficiency


- per renal, resuming lasix today at a lower dose (40 mg) as mentioned above.  

monitor for need to uptitrate to home dose of lasix 100 mg po daily


- daily weights, i/o's.   


- ace wraps to LE to prevent worsening LE edema. 





diana, ckd/hyperkalemia


-  improvement s/p ivf and transfusion.   renal following, resuming low dose 

lasix 5/9


- holding diovan. 





anemia 


- ongoing mgm't per pmd/gi


- s/p prbc's on 5/7 and 5/8

## 2018-05-09 NOTE — PN
Progress Note (short form)





- Note


Progress Note: 





Renal follow up for PATRIZIA/CKD





Pt seen and examined at the bedside


no acute complaints


no sob, chest pain 


tolerating oral diet 


 


 Vital Signs











Temperature  98.2 F   05/09/18 06:00


 


Pulse Rate  50 L  05/09/18 06:00


 


Respiratory Rate  20   05/09/18 06:00


 


Blood Pressure  126/52   05/09/18 06:00


 


O2 Sat by Pulse Oximetry (%)  96   05/08/18 23:00








 Intake & Output











 05/06/18 05/07/18 05/08/18 05/09/18





 23:59 23:59 23:59 23:59


 


Intake Total  1500 1570 550


 


Balance  1500 1570 550


 


Weight 87.713 kg   93.071 kg











NAD on RA


RRR, No M/R


CTA, no rales or wheeze


soft NT/ND


1+ LE edema at the ankles, no cyanosis or clubbing 


 


 CBC, BMP





 05/09/18 08:08 





 05/09/18 08:08 


 Laboratory Tests











  05/09/18





  08:08


 


Calcium  9.1


 


Phosphorus  4.0


 


Magnesium  2.4











 Current Medications





Acetaminophen (Tylenol -)  650 mg PO Q6H PRN


   PRN Reason: FEVER


   Last Admin: 05/08/18 20:25 Dose:  650 mg


Allopurinol (Zyloprim -)  100 mg PO DAILY [facility]


   Last Admin: 05/09/18 09:33 Dose:  100 mg


Amlodipine Besylate (Norvasc -)  5 mg PO DAILY [facility]


   Last Admin: 05/09/18 09:33 Dose:  5 mg


Budesonide/Formoterol Fumarate (Symbicort 160/4.5mcg -)  2 puff IH BID [facility]


   Last Admin: 05/09/18 09:35 Dose:  2 puff


Gabapentin (Neurontin -)  200 mg PO TID [facility]


   Last Admin: 05/09/18 05:47 Dose:  200 mg


Insulin Aspart (Novolog Vial Sliding Scale -)  1 vial SQ HS [facility]


   PRN Reason: Protocol


   Last Admin: 05/08/18 21:35 Dose:  Not Given


Insulin Aspart (Novolog Vial Sliding Scale -)  1 vial SQ TIDAC [facility]


   PRN Reason: Protocol


   Last Admin: 05/09/18 12:04 Dose:  Not Given


Meclizine HCl (Antivert -)  25 mg PO TID [facility]


   PRN Reason: VERTIGO


Polyethylene Glycol (Miralax (For Daily Use) -)  17 gm PO BID [facility]


   Last Admin: 05/09/18 09:34 Dose:  17 gm


Psyllium Hydrophilic Mucilloid (Metamucil (Sugar-Free) -)  5.85 gm PO DAILY [facility]


   Last Admin: 05/09/18 09:33 Dose:  5.85 gm


Tramadol HCl (Ultram -)  50 mg PO Q8H PRN


   PRN Reason: PAIN LEVEL 4 - 10


   Last Admin: 05/07/18 17:09 Dose:  50 mg














87 year old woman with PMhx of CKD (Last Cr in our office was 1.6 in 2014), DM, 

COPD, Hypertension, Diverticulosis, DM who presented from home with complaints 

of weakness and dizziness and found to have acute on chronic anemia and PATRIZIA.





#PATRIZIA on CKD vs. progressive CKD (differential includes intravascular volume 

depletion vs. normotensive ATN vs. AIN)


#Acute on Chronic Anenemia


#Hypertensoin 


#DM not on insulin 





Renal function improving off IVF


will start Lasix 40mg Once daily for LE edema, can be titrated as outpatient


Hold Valsartan for now as K remains high


Low K diet 


Hgb improved s/p prbc transfusion 


iron saturation is 18%, will give IV venofer x 1 today 








Rodrigo Wyatt DO

## 2018-05-09 NOTE — PN
Progress Note, Physician


Chief Complaint: 





no distress


 feels well








- Current Medication List


Current Medications: 


Active Medications





Acetaminophen (Tylenol -)  650 mg PO Q6H PRN


   PRN Reason: FEVER


   Last Admin: 05/08/18 20:25 Dose:  650 mg


Allopurinol (Zyloprim -)  100 mg PO DAILY [facility]


   Last Admin: 05/09/18 09:33 Dose:  100 mg


Amlodipine Besylate (Norvasc -)  5 mg PO DAILY [facility]


   Last Admin: 05/09/18 09:33 Dose:  5 mg


Budesonide/Formoterol Fumarate (Symbicort 160/4.5mcg -)  2 puff IH BID [facility]


   Last Admin: 05/09/18 09:35 Dose:  2 puff


Gabapentin (Neurontin -)  200 mg PO TID [facility]


   Last Admin: 05/09/18 05:47 Dose:  200 mg


Insulin Aspart (Novolog Vial Sliding Scale -)  1 vial SQ HS [facility]


   PRN Reason: Protocol


   Last Admin: 05/08/18 21:35 Dose:  Not Given


Insulin Aspart (Novolog Vial Sliding Scale -)  1 vial SQ TIDAC [facility]


   PRN Reason: Protocol


   Last Admin: 05/09/18 06:09 Dose:  Not Given


Meclizine HCl (Antivert -)  25 mg PO TID [facility]


   PRN Reason: VERTIGO


Polyethylene Glycol (Miralax (For Daily Use) -)  17 gm PO BID [facility]


   Last Admin: 05/09/18 09:34 Dose:  17 gm


Psyllium Hydrophilic Mucilloid (Metamucil (Sugar-Free) -)  5.85 gm PO DAILY [facility]


   Last Admin: 05/09/18 09:33 Dose:  5.85 gm


Tramadol HCl (Ultram -)  50 mg PO Q8H PRN


   PRN Reason: PAIN LEVEL 4 - 10


   Last Admin: 05/07/18 17:09 Dose:  50 mg











- Objective


Vital Signs: 


 Vital Signs











Temperature  98.2 F   05/09/18 06:00


 


Pulse Rate  50 L  05/09/18 06:00


 


Respiratory Rate  20   05/09/18 06:00


 


Blood Pressure  126/52   05/09/18 06:00


 


O2 Sat by Pulse Oximetry (%)  96   05/08/18 23:00











Constitutional: Yes: No Distress


Cardiovascular: Yes: Regular Rate and Rhythm, Murmur


Respiratory: Yes: Diminished


Gastrointestinal: Yes: Normal Bowel Sounds, Soft, Abdomen, Obese.  No: 

Tenderness


Edema: Yes


Labs: 


 CBC, BMP





 05/09/18 08:08 





 INR, PTT











INR  1.17  (0.82-1.09)  H  05/06/18  16:30    














Problem List





- Problems


(1) PATRIZIA (acute kidney injury)


Code(s): N17.9 - ACUTE KIDNEY FAILURE, UNSPECIFIED   





(2) Dizziness


Code(s): R42 - DIZZINESS AND GIDDINESS   





(3) Vertigo


Code(s): R42 - DIZZINESS AND GIDDINESS   





(4) (HFpEF) heart failure with preserved ejection fraction


Code(s): I50.9 - HEART FAILURE, UNSPECIFIED   





Assessment/Plan





s/p 2 units PRBC


had low grade fever 


blood cultures drawn and are pending


spoke with Renal -- she will need to be Lasix once daily 40mg


monitor renal function

## 2018-05-10 VITALS — SYSTOLIC BLOOD PRESSURE: 132 MMHG | TEMPERATURE: 99.2 F | HEART RATE: 47 BPM | DIASTOLIC BLOOD PRESSURE: 52 MMHG

## 2018-05-10 LAB
ANION GAP SERPL CALC-SCNC: 8 MMOL/L (ref 8–16)
BASOPHILS # BLD: 0.9 % (ref 0–2)
BUN SERPL-MCNC: 45 MG/DL (ref 7–18)
CALCIUM SERPL-MCNC: 9 MG/DL (ref 8.5–10.1)
CHLORIDE SERPL-SCNC: 111 MMOL/L (ref 98–107)
CO2 SERPL-SCNC: 23 MMOL/L (ref 21–32)
CREAT SERPL-MCNC: 2 MG/DL (ref 0.55–1.02)
DEPRECATED RDW RBC AUTO: 16 % (ref 11.6–15.6)
EOSINOPHIL # BLD: 1.7 % (ref 0–4.5)
GLUCOSE SERPL-MCNC: 119 MG/DL (ref 74–106)
HCT VFR BLD CALC: 30.7 % (ref 32.4–45.2)
HGB BLD-MCNC: 10.1 GM/DL (ref 10.7–15.3)
LYMPHOCYTES # BLD: 16.2 % (ref 8–40)
MAGNESIUM SERPL-MCNC: 2.3 MG/DL (ref 1.8–2.4)
MCH RBC QN AUTO: 29 PG (ref 25.7–33.7)
MCHC RBC AUTO-ENTMCNC: 33 G/DL (ref 32–36)
MCV RBC: 87.7 FL (ref 80–96)
MONOCYTES # BLD AUTO: 6.7 % (ref 3.8–10.2)
NEUTROPHILS # BLD: 74.5 % (ref 42.8–82.8)
PHOSPHATE SERPL-MCNC: 3.2 MG/DL (ref 2.5–4.9)
PLATELET # BLD AUTO: 247 K/MM3 (ref 134–434)
PMV BLD: 9.4 FL (ref 7.5–11.1)
POTASSIUM SERPLBLD-SCNC: 4.7 MMOL/L (ref 3.5–5.1)
RBC # BLD AUTO: 3.5 M/MM3 (ref 3.6–5.2)
SODIUM SERPL-SCNC: 142 MMOL/L (ref 136–145)
WBC # BLD AUTO: 6.9 K/MM3 (ref 4–10)

## 2018-05-10 RX ADMIN — GABAPENTIN SCH MG: 100 CAPSULE ORAL at 13:40

## 2018-05-10 RX ADMIN — FUROSEMIDE SCH MG: 40 TABLET ORAL at 10:11

## 2018-05-10 RX ADMIN — INSULIN ASPART SCH: 100 INJECTION, SOLUTION INTRAVENOUS; SUBCUTANEOUS at 12:25

## 2018-05-10 RX ADMIN — GABAPENTIN SCH MG: 100 CAPSULE ORAL at 05:53

## 2018-05-10 RX ADMIN — AMLODIPINE BESYLATE SCH MG: 5 TABLET ORAL at 10:11

## 2018-05-10 RX ADMIN — BUDESONIDE AND FORMOTEROL FUMARATE DIHYDRATE SCH PUFF: 160; 4.5 AEROSOL RESPIRATORY (INHALATION) at 10:12

## 2018-05-10 RX ADMIN — POLYETHYLENE GLYCOL 3350 SCH GM: 17 POWDER, FOR SOLUTION ORAL at 10:11

## 2018-05-10 RX ADMIN — PSYLLIUM HUSK SCH GM: 3.4 POWDER ORAL at 10:12

## 2018-05-10 RX ADMIN — INSULIN ASPART SCH: 100 INJECTION, SOLUTION INTRAVENOUS; SUBCUTANEOUS at 17:39

## 2018-05-10 RX ADMIN — ALLOPURINOL SCH MG: 100 TABLET ORAL at 10:11

## 2018-05-10 RX ADMIN — INSULIN ASPART SCH: 100 INJECTION, SOLUTION INTRAVENOUS; SUBCUTANEOUS at 08:35

## 2018-05-10 NOTE — DS
Physical Examination


Vital Signs: 


 Vital Signs











Temperature  98.1 F   05/10/18 06:00


 


Pulse Rate  45 L  05/10/18 06:00


 


Respiratory Rate  20   05/10/18 06:00


 


Blood Pressure  131/45   05/10/18 06:00


 


O2 Sat by Pulse Oximetry (%)  96   05/08/18 23:00











Labs: 


 CBC, BMP





 05/10/18 09:05 





 05/10/18 09:05 











Discharge Summary


Reason For Visit: VERTIGO, ACUTE RENAL FAILURE SUPERIMPOSED ON CKD


Current Active Problems





PATRIZIA (acute kidney injury) (Acute)


Constipation (Acute)


Dizziness (Acute)


Vertigo (Acute)








Condition: Improved





- Instructions


Referrals: 


Yamel Young MD [Staff Physician] - 


Jace Mckee MD [Primary Care Provider] - 


Disposition: HOME





- Home Medications


Comprehensive Discharge Medication List: 


Ambulatory Orders





Allopurinol [Zyloprim -] 100 mg PO DAILY 07/19/14 


Budesonide/Formeterol Fumarate [SYMBICORT 160/4.5mcg -] 2 inh PO BID 07/19/14 


Gabapentin 200 mg PO TID 07/19/14 


Glipizide 5 mg PO DAILY 09/09/17 


Furosemide [Lasix -] 100 mg PO DAILY 11/02/17 


Valsartan 320 mg PO DAILY 02/06/18 


Pantoprazole Sodium [Protonix] 40 mg PO DAILY #30 tablet. 02/19/18 


Polyethylene Glycol 3350 [Miralax 119 gm Btl -] 17 gm PO BID  bottle 02/19/18

## 2018-05-10 NOTE — PN
Progress Note (short form)





- Note


Progress Note: 





Renal follow up for PATRIZIA/CKD





Pt seen and examined at the bedside


no acute complaints


denies any sob


legs in wrap


making urine


no CP, Abd pain, N/V/D


tolerating oral diet 


 


 


 Vital Signs











Temperature  99.2 F   05/10/18 15:10


 


Pulse Rate  47 L  05/10/18 15:10


 


Respiratory Rate  20   05/10/18 15:10


 


Blood Pressure  132/52   05/10/18 15:10


 


O2 Sat by Pulse Oximetry (%)  96   05/08/18 23:00








 Intake & Output











 05/07/18 05/08/18 05/09/18 05/10/18





 23:59 23:59 23:59 23:59


 


Intake Total 1500 1570 1050 1000


 


Balance 1500 1570 1050 1000


 


Weight   93.071 kg 92.986 kg














NAD on RA


RRR, No M/R


CTA, no rales or wheeze


soft NT/ND


1+ LE edema at the ankles, no cyanosis or clubbing 


 


 


 CBC, BMP





 05/10/18 09:05 





 05/10/18 09:05 





 Current Medications





Acetaminophen (Tylenol -)  650 mg PO Q6H PRN


   PRN Reason: FEVER


   Last Admin: 05/08/18 20:25 Dose:  650 mg


Allopurinol (Zyloprim -)  100 mg PO DAILY [facility]


   Last Admin: 05/10/18 10:11 Dose:  100 mg


Amlodipine Besylate (Norvasc -)  5 mg PO DAILY [facility]


   Last Admin: 05/10/18 10:11 Dose:  5 mg


Budesonide/Formoterol Fumarate (Symbicort 160/4.5mcg -)  2 puff IH BID [facility]


   Last Admin: 05/10/18 10:12 Dose:  2 puff


Furosemide (Lasix -)  20 mg PO BID@0600,1400 [facility]


Gabapentin (Neurontin -)  200 mg PO TID [facility]


   Last Admin: 05/10/18 13:40 Dose:  200 mg


Insulin Aspart (Novolog Vial Sliding Scale -)  1 vial SQ HS [facility]


   PRN Reason: Protocol


   Last Admin: 05/09/18 22:24 Dose:  Not Given


Insulin Aspart (Novolog Vial Sliding Scale -)  1 vial SQ TIDAC [facility]


   PRN Reason: Protocol


   Last Admin: 05/10/18 12:25 Dose:  Not Given


Meclizine HCl (Antivert -)  25 mg PO TID [facility]


   PRN Reason: VERTIGO


Polyethylene Glycol (Miralax (For Daily Use) -)  17 gm PO BID [facility]


   Last Admin: 05/10/18 10:11 Dose:  17 gm


Psyllium Hydrophilic Mucilloid (Metamucil (Sugar-Free) -)  5.85 gm PO DAILY [facility]


   Last Admin: 05/10/18 10:12 Dose:  5.85 gm


Tramadol HCl (Ultram -)  50 mg PO Q8H PRN


   PRN Reason: PAIN LEVEL 4 - 10


   Last Admin: 05/07/18 17:09 Dose:  50 mg











87 year old woman with PMhx of CKD (Last Cr in our office was 1.6 in 2014), DM, 

COPD, Hypertension, Diverticulosis, DM who presented from home with complaints 

of weakness and dizziness and found to have acute on chronic anemia and PATRIZIA.





#PATRIZIA on CKD secondary to intravascular volume depletion 


#Acute on Chronic Anemia


#Hypertension 


#DM not on insulin 





Renal function stable and improving


increase Lasix to 40mg BID


hold ARB until steady dose of Lasix is achieved 


Hgb at goal


Iron infusion as per Heme 





Rodrigo Wyatt DO

## 2018-09-06 ENCOUNTER — HOSPITAL ENCOUNTER (OUTPATIENT)
Dept: HOSPITAL 74 - JASU-SURG | Age: 83
Discharge: HOME | End: 2018-09-06
Attending: SURGERY
Payer: COMMERCIAL

## 2018-09-06 VITALS — DIASTOLIC BLOOD PRESSURE: 56 MMHG | HEART RATE: 64 BPM | SYSTOLIC BLOOD PRESSURE: 135 MMHG

## 2018-09-06 VITALS — BODY MASS INDEX: 35.6 KG/M2

## 2018-09-06 VITALS — TEMPERATURE: 98.2 F

## 2018-09-06 DIAGNOSIS — E11.21: ICD-10-CM

## 2018-09-06 DIAGNOSIS — R51: Primary | ICD-10-CM

## 2018-09-06 DIAGNOSIS — E11.319: ICD-10-CM

## 2018-09-06 DIAGNOSIS — I10: ICD-10-CM

## 2018-09-06 DIAGNOSIS — I50.9: ICD-10-CM

## 2018-09-06 DIAGNOSIS — Z79.84: ICD-10-CM

## 2018-09-06 PROCEDURE — 03BS0ZX EXCISION OF RIGHT TEMPORAL ARTERY, OPEN APPROACH, DIAGNOSTIC: ICD-10-PCS | Performed by: SURGERY

## 2018-09-06 NOTE — OP
Operative Note





- Note:


Operative Date: 09/06/18


Pre-Operative Diagnosis: temporal arteritis


Operation: Right temporal artery biopsy


Post-Operative Diagnosis: Same as Pre-op


Surgeon: Greg Dennis


Anesthesia: Fractional


Estimated Blood Loss (mls): 20


Operative Report Dictated: Yes

## 2018-09-06 NOTE — HP
Admitting History and Physical





- Admission


Chief Complaint: rule out temporal arteritis with headaches and elevated esr


Limitations to Obtaining History: No Limitations





- Past Medical History


CNS: Yes: Other (chronic headache with negative temporal artery biopsy)


Cardiovascular: Yes: CHF, HTN, Hyperlipdemia


Pulmonary: Yes: COPD


Gastrointestinal: Yes: Constipation, Diverticulosis, Gastritis, GI Bleed, 

Hemorrhoids, Other (colon polyp , ventral incisional hernias, esophageal 

candidiasis )


Renal/: Yes: Renal Inusuff (diabetic nephropathy)


Endocrine: Yes: Diabetes Mellitus, Other (diabetic retinopathy and nephropathy)





- Past Surgical History


Past Surgical History: Yes: Appendectomy, Breast Biopsy, Cataract Removal, 

Colonoscopy (diverticular bleed , right colon polyp removed remotely ), 

Hernia Repair, Hysterectomy, Oopherectomy, Tonsillectomy, Upper Endoscopy (2016 

with esophageal candidiasis, gastritis and duodenitis)





- Smoking History


Smoking history: Never smoked


Have you smoked in the past 12 months: No


Aproximately how many cigarettes per day: 0





- Alcohol/Substance Use


Hx Alcohol Use: Yes (1/week)


History of Substance Use: reports: None





- Social History


ADL: Independent


Occupation: retired 


History of Recent Travel: No





Home Medications





- Allergies


Allergies/Adverse Reactions: 


 Allergies











Allergy/AdvReac Type Severity Reaction Status Date / Time


 


diphenhydramine HCl Allergy Severe anaphyl Verified 18 12:27





[From Benadryl]     


 


aspirin Allergy Unknown Swelling Verified 18 12:27


 


natty flavor AdvReac Severe Swelling Verified 18 12:27














- Home Medications


Home Medications: 


Ambulatory Orders





Allopurinol [Zyloprim -] 100 mg PO DAILY 14 


Budesonide/Formeterol Fumarate [SYMBICORT 160/4.5mcg -] 2 inh PO BID 14 


Gabapentin 200 mg PO TID 14 


Glipizide 5 mg PO DAILY 17 


Pantoprazole Sodium [Protonix] 40 mg PO DAILY #30 tablet. 18 


Amlodipine Besylate [Norvasc -] 5 mg PO DAILY #30 tablet 05/10/18 


Furosemide [Lasix -] 40 mg PO DAILY #0 tab 05/10/18 


Meclizine HCl [Antivert -] 25 mg PO TID PRN #30 tablet 05/10/18 


Atorvastatin Calcium [Lipitor] 20 mg PO DAILY 18 


Polyethylene Glycol 3350 [Miralax (For Daily Use) -] 17 gm PO DAILY 18 


Torsemide [Demadex] 100 mg PO DAILY 18 


Prednisone 23 mg PO TID 18 











Family Disease History





- Family Disease History


Family Disease History: Heart Disease: Father ( MI in his 50's), CA: Mother 

(rectal cancer)





Review of Systems





- Review of Systems


Constitutional: reports: No Symptoms


Eyes: reports: No Symptoms


HENT: reports: No Symptoms


Neck: reports: No Symptoms


Cardiovascular: reports: No Symptoms


Respiratory: reports: No Symptoms


Gastrointestinal: reports: No Symptoms


Genitourinary: reports: No Symptoms


Musculoskeletal: reports: No Symptoms


Integumentary: reports: No Symptoms


Neurological: reports: No Symptoms


Endocrine: reports: No Symptoms


Hematology/Lymphatic: reports: No Symptoms


Psychiatric: reports: No Symptoms





Physical Examination


Vital Signs: 


 Vital Signs











Temperature  98.2 F   18 10:10


 


Pulse Rate  58 L  18 10:10


 


Respiratory Rate  16   18 10:10


 


Blood Pressure  135/61   18 10:10


 


O2 Sat by Pulse Oximetry (%)  99   18 10:24











Constitutional: Yes: Well Nourished, No Distress, Calm


Eyes: Yes: WNL, Conjunctiva Clear, EOM Intact


HENT: Yes: WNL, Atraumatic, Normocephalic


Neck: Yes: WNL, Supple, Trachea Midline


Cardiovascular: Yes: WNL, Regular Rate and Rhythm


Respiratory: Yes: WNL, Regular, CTA Bilaterally


Gastrointestinal: Yes: WNL, Normal Bowel Sounds


Musculoskeletal: Yes: WNL


Extremities: Yes: WNL


Edema: No


Integumentary: Yes: WNL


Neurological: Yes: WNL, Alert, Oriented


...Motor Strength: WNL


Psychiatric: Yes: WNL





Problem List





- Problems


(1) Temporal arteritis


Assessment/Plan: 


rule out temporal arteritis 





1. for temporal artery biopsy 


Code(s): M31.6 - OTHER GIANT CELL ARTERITIS

## 2018-09-07 NOTE — PATH
Surgical Pathology Report



Patient Name:  LING CORRALES

Accession #:  [id number]

[facility]. Rec. #:  [id number]                                                        

   /Age/Gender:  1930 (Age: 88) / F

Account:  [id number]                                                          

             Location: [facility]

Taken:  2018

Received:  2018

Reported:  2018

Physicians:  Greg Dennis

  



Specimen(s) Received

 RIGHT TEMPORAL ARTERY BIOPSY 





Clinical History

Temporal arteritis







Final Diagnosis

TEMPORAL ARTERY, RIGHT, BIOPSY:

MUSCULAR ARTERY WITH NO EVIDENCE OF ARTERITIS.

RARE MICROCALCIFICATIONS WITHIN VESSEL WALL PRESENT.

MULTIPLE LEVELS EXAMINED.





***Electronically Signed***

Claudia Altamirano M.D.





Gross Description

Received in formalin labeled "right temporal artery," is a 1.5 cm in length

portion of vasculature, consistent with a temporal artery biopsy. The specimen

is sectioned and is entirely submitted in one cassette.

/2018



saudi/[email or web address]

## 2018-09-26 ENCOUNTER — HOSPITAL ENCOUNTER (INPATIENT)
Dept: HOSPITAL 74 - JER | Age: 83
LOS: 5 days | Discharge: HOME | DRG: 546 | End: 2018-10-01
Admitting: INTERNAL MEDICINE
Payer: COMMERCIAL

## 2018-09-26 VITALS — BODY MASS INDEX: 38.2 KG/M2

## 2018-09-26 DIAGNOSIS — I44.4: ICD-10-CM

## 2018-09-26 DIAGNOSIS — E11.22: ICD-10-CM

## 2018-09-26 DIAGNOSIS — E78.5: ICD-10-CM

## 2018-09-26 DIAGNOSIS — I13.0: ICD-10-CM

## 2018-09-26 DIAGNOSIS — E87.6: ICD-10-CM

## 2018-09-26 DIAGNOSIS — Z79.84: ICD-10-CM

## 2018-09-26 DIAGNOSIS — E87.2: ICD-10-CM

## 2018-09-26 DIAGNOSIS — H40.9: ICD-10-CM

## 2018-09-26 DIAGNOSIS — M31.6: Primary | ICD-10-CM

## 2018-09-26 DIAGNOSIS — D69.6: ICD-10-CM

## 2018-09-26 DIAGNOSIS — I50.32: ICD-10-CM

## 2018-09-26 DIAGNOSIS — D64.9: ICD-10-CM

## 2018-09-26 DIAGNOSIS — R00.1: ICD-10-CM

## 2018-09-26 DIAGNOSIS — J44.9: ICD-10-CM

## 2018-09-26 DIAGNOSIS — N17.9: ICD-10-CM

## 2018-09-26 DIAGNOSIS — R51: ICD-10-CM

## 2018-09-26 DIAGNOSIS — K29.60: ICD-10-CM

## 2018-09-26 DIAGNOSIS — I45.10: ICD-10-CM

## 2018-09-26 DIAGNOSIS — K59.00: ICD-10-CM

## 2018-09-26 DIAGNOSIS — I12.9: ICD-10-CM

## 2018-09-26 DIAGNOSIS — M10.9: ICD-10-CM

## 2018-09-26 DIAGNOSIS — N18.3: ICD-10-CM

## 2018-09-26 DIAGNOSIS — K57.90: ICD-10-CM

## 2018-09-26 DIAGNOSIS — I87.2: ICD-10-CM

## 2018-09-26 LAB
ALBUMIN SERPL-MCNC: 3.3 G/DL (ref 3.4–5)
ALP SERPL-CCNC: 161 U/L (ref 45–117)
ALT SERPL-CCNC: 73 U/L (ref 13–61)
ANION GAP SERPL CALC-SCNC: 11 MMOL/L (ref 8–16)
APPEARANCE UR: (no result)
AST SERPL-CCNC: 46 U/L (ref 15–37)
BACTERIA #/AREA URNS HPF: (no result) /HPF
BASOPHILS # BLD: 0.6 % (ref 0–2)
BILIRUB SERPL-MCNC: 0.9 MG/DL (ref 0.2–1)
BILIRUB UR STRIP.AUTO-MCNC: NEGATIVE MG/DL
BUN SERPL-MCNC: 57 MG/DL (ref 7–18)
CALCIUM SERPL-MCNC: 9.3 MG/DL (ref 8.5–10.1)
CHLORIDE SERPL-SCNC: 100 MMOL/L (ref 98–107)
CO2 SERPL-SCNC: 26 MMOL/L (ref 21–32)
COLOR UR: (no result)
CREAT SERPL-MCNC: 2.3 MG/DL (ref 0.55–1.3)
DEPRECATED RDW RBC AUTO: 18.4 % (ref 11.6–15.6)
EOSINOPHIL # BLD: 0 % (ref 0–4.5)
EPITH CASTS URNS QL MICRO: (no result) /HPF
GLUCOSE SERPL-MCNC: 50 MG/DL (ref 74–106)
HCT VFR BLD CALC: 39.4 % (ref 32.4–45.2)
HGB BLD-MCNC: 12.9 GM/DL (ref 10.7–15.3)
INR BLD: 0.96 (ref 0.83–1.09)
KETONES UR QL STRIP: NEGATIVE
LEUKOCYTE ESTERASE UR QL STRIP.AUTO: (no result)
LYMPHOCYTES # BLD: 7.1 % (ref 8–40)
MCH RBC QN AUTO: 28.5 PG (ref 25.7–33.7)
MCHC RBC AUTO-ENTMCNC: 32.8 G/DL (ref 32–36)
MCV RBC: 86.8 FL (ref 80–96)
MONOCYTES # BLD AUTO: 3.3 % (ref 3.8–10.2)
NEUTROPHILS # BLD: 89 % (ref 42.8–82.8)
NITRITE UR QL STRIP: NEGATIVE
PH UR: 5 [PH] (ref 5–8)
PLATELET # BLD AUTO: 106 K/MM3 (ref 134–434)
PMV BLD: 8.7 FL (ref 7.5–11.1)
POTASSIUM SERPLBLD-SCNC: 3.7 MMOL/L (ref 3.5–5.1)
PROT SERPL-MCNC: 6.6 G/DL (ref 6.4–8.2)
PROT UR QL STRIP: NEGATIVE
PROT UR QL STRIP: NEGATIVE
PT PNL PPP: 10.8 SEC (ref 9.7–13)
RBC # BLD AUTO: 4.53 M/MM3 (ref 3.6–5.2)
SODIUM SERPL-SCNC: 137 MMOL/L (ref 136–145)
SP GR UR: 1.01 (ref 1–1.03)
UROBILINOGEN UR STRIP-MCNC: NEGATIVE MG/DL (ref 0.2–1)
WBC # BLD AUTO: 8.1 K/MM3 (ref 4–10)

## 2018-09-26 PROCEDURE — G0378 HOSPITAL OBSERVATION PER HR: HCPCS

## 2018-09-26 RX ADMIN — INSULIN ASPART SCH UNIT: 100 INJECTION, SOLUTION INTRAVENOUS; SUBCUTANEOUS at 23:55

## 2018-09-26 RX ADMIN — HEPARIN SODIUM SCH UNIT: 5000 INJECTION, SOLUTION INTRAVENOUS; SUBCUTANEOUS at 23:45

## 2018-09-26 RX ADMIN — ATORVASTATIN CALCIUM SCH MG: 20 TABLET, FILM COATED ORAL at 23:50

## 2018-09-26 RX ADMIN — PREDNISONE SCH MG: 10 TABLET ORAL at 23:50

## 2018-09-26 RX ADMIN — GABAPENTIN SCH MG: 100 CAPSULE ORAL at 23:50

## 2018-09-26 NOTE — PDOC
History of Present Illness





- General


Chief Complaint: Weakness


Stated Complaint: BLURRY VISION, WEAKNESS


Time Seen by Provider: 09/26/18 13:34


History Source: Patient, Family (son)





- History of Present Illness


Initial Comments: 





09/26/18 14:17


Pt is an 89yo f with PMH of DM, HTN, HLD, CKD sent to ED by PCP Dr. Mckee. Per 

son, they went to PCP office and PCP concerned that pt was acting more confused 

than usual and has been eating less. PCP wanted her to be sent here and be 

admitted. Pt is complaining of "everything, My eyes are dim". Pt is blind in 

her R eye but states that her vision has been "getting darker" for 1-2 weeks 

now equally in both eyes and is not localized to any field of vision. No pain 

in the yes. She has had headahces in the past, and recently had a temporal 

artery biopsy done on the R side which was negative for GCA. She has not had an 

MI or CA in the past. She is not taking any blood thinners. She admits to loss 

of appetite, urinary frequency and swelling in her legs (which is chronic). She 

denies fever, chills, chest pain, cough, SOB, headache, changes in hearing, new 

onset weakness, numbness/tingling, weight loss, hematuria, blood in stool, 

abdominal pain, n/v/d, dysuria, eye pain. 


Pt was started on steroids and is currently taking insulin. 





PCP: Dr. Mckee


PMH: see hpi


PSH: 


Meds: see med rec. 








Past History





- Past Medical History


Allergies/Adverse Reactions: 


 Allergies











Allergy/AdvReac Type Severity Reaction Status Date / Time


 


diphenhydramine HCl Allergy Severe anaphyl Verified 09/26/18 13:14





[From Benadryl]     


 


aspirin Allergy Unknown Swelling Verified 09/26/18 13:14


 


natty flavor AdvReac Severe Swelling Verified 09/26/18 13:14











Home Medications: 


Ambulatory Orders





Allopurinol [Zyloprim -] 100 mg PO DAILY 07/19/14 


Budesonide/Formeterol Fumarate [SYMBICORT 160/4.5mcg -] 2 inh PO BID 07/19/14 


Gabapentin 200 mg PO TID 07/19/14 


Glipizide 5 mg PO DAILY 09/09/17 


Pantoprazole Sodium [Protonix] 40 mg PO DAILY #30 tablet. 02/19/18 


Amlodipine Besylate [Norvasc -] 5 mg PO DAILY #30 tablet 05/10/18 


Meclizine HCl [Antivert -] 25 mg PO TID PRN #30 tablet 05/10/18 


Atorvastatin Calcium [Lipitor] 20 mg PO DAILY 08/20/18 


Polyethylene Glycol 3350 [Miralax (For Daily Use) -] 17 gm PO DAILY 08/20/18 


Torsemide [Demadex] 100 mg PO DAILY 08/20/18 


Prednisone 23 mg PO TID 09/04/18 








Anemia: Yes


Asthma: Yes


Cancer: No


Cardiac Disorders: Yes (bradycardia)


CVA: No


COPD: No


CHF: No


DVT: No


Dementia: No


Diabetes: Yes


GI Disorders: Yes (diverticulitis)


 Disorders: No


HTN: Yes


Hypercholesterolemia: Yes


Liver Disease: No


Seizures: No


Thyroid Disease: No





- Surgical History


Abdominal Surgery: Yes (hernia repairs, small bowel resection,debride abd wall 

abscess,)


Appendectomy: Yes





- Immunization History


Immunization Up to Date: Yes





- Suicide/Smoking/Psychosocial Hx


Smoking Status: No


Smoking History: Never smoked


Have you smoked in the past 12 months: No


Number of Cigarettes Smoked Daily: 0


Hx Alcohol Use: No


Drug/Substance Use Hx: No


Substance Use Type: None


Hx Substance Use Treatment: No





**Review of Systems





- Review of Systems


Constitutional: Yes: Weakness, Weight Stable.  No: Chills, Fever, Unintentional 

Wgt. Loss


HEENTM: Yes: Blurred Vision.  No: Eye Pain, Recent change in vision, Double 

Vision, Ear Pain, Ear Discharge, Nose Congestion, Hearing Loss, Throat Pain


Respiratory: No: Cough, Shortness of Breath, Hemoptysis


Cardiac (ROS): No: Chest Pain, Lightheadedness, Syncope


ABD/GI: Yes: Constipated.  No: Diarrhea, Nausea, Rectal Bleeding, Vomiting


: Yes: Frequency.  No: Burning, Dysuria, Hematuria


Musculoskeletal: No: Back Pain, Joint Pain, Neck Pain


Neurological: No: Headache, Numbness, Paresthesia, Tingling, Weakness


Hematologic/Lymphatic: Yes: Anemia





*Physical Exam





- Vital Signs


 Last Vital Signs











Temp Pulse Resp BP Pulse Ox


 


 98.6 F   71   18   126/57 L  99 


 


 09/26/18 13:15  09/26/18 13:15  09/26/18 13:15  09/26/18 13:15  09/26/18 13:15














- Physical Exam


General Appearance: Yes: Nourished, Appropriately Dressed.  No: Apparent 

Distress


HEENT: positive: EOMI, ROSEMARIE, TMs Normal, Thrush, Other.  negative: Pale 

Conjunctivae, Photophobia (No pain with eye movements. Vision OD pt could not 

read chart. Vision OS 20/30), Scleral Icterus (R), Scleral Icterus (L), 

Tonsillar Exudate, Tonsillar Erythema, Hearing Decreased, TM Bulging, TM Dull, 

TM Erythema


Neck: positive: Trachea midline, Supple.  negative: Carotid bruit, 

Lymphadenopathy (R), Lymphadenopathy (L)


Respiratory/Chest: positive: Lungs Clear, Normal Breath Sounds.  negative: 

Crackles, Rales, Rhonchi, Stridor


Cardiovascular: positive: S1, S2, Irregular.  negative: Edema, JVD, Murmur, 

Bradycardia, Tachycardia


Vascular Pulses: Carotid (R): 2+, Carotid (L): 2+


Gastrointestinal/Abdominal: positive: Normal Bowel Sounds, Soft.  negative: 

Distended, Guarding, Rebound, Tenderness


Musculoskeletal: positive: Normal Inspection.  negative: CVA Tenderness, CVA 

Tenderness (R), CVA Tenderness (L)


Extremity: positive: Normal Capillary Refill, Pedal Edema (2+), Swelling.  

negative: Cyanosis, Calf Tenderness, Erythema, Inflammation


Integumentary: positive: Normal Color, Dry, Warm.  negative: Rash, Ecchymosis


Neurologic: positive: CNs II-XII NML intact, Fully Oriented, Alert, Normal Mood/

Affect, Normal Response, Motor Strength 5/5





ED Treatment Course





- LABORATORY


CBC & Chemistry Diagram: 


 09/26/18 15:32





 09/26/18 14:18





- RADIOLOGY


Radiology Studies Ordered: 














 Category Date Time Status


 


 CHEST X-RAY PORTABLE* [RAD] Stat Radiology  09/26/18 14:13 Ordered














Medical Decision Making





- Medical Decision Making





09/26/18 15:40


Pt is an 89yo f with PMH of DM, HTN, HLD, CKD sent to ED by PCP Dr. Mckee. Per 

son, they went to PCP office and PCP concerned that pt was acting more confused 

than usual and has been eating less.


   Vitals: wnl, afebrile


   PE: pedal edema, irregular heart beat. reduced vision in R eye (chronic). 

Lung sounds clear


Dx: failure to thrive? No signs of infection.  Pt on prednisone and insulin. 

possible hypo/hyperglycemia.


Ordered basic labs, cxr, ekg, urine. Will check nutrition status and blood 

sugars. 





09/26/18 16:34


Gluc 50, Trop 0.34, Hgb 12.9, BUN 57, Cr 2.3, elevated LFTs and Alkphos. Lact 

3.0


Previous trop around 4 months ago wnl. 


EKG: sinus with PVC. RBBB No ROMAINE/depressions, no peaked t waves. Compared to 

EKG on 8/20/18: new PVC, 


-Will give fluids and juice


-Poss NSTEMI, pt allergic to ASA so cannot give it. Will consult cardio, repeat 

EKG and repeat Trop





09/26/18 17:13


CT head: no new changes since CT 8/20/18. No new ischemia/infarcts/hemorrhage/

mass lesions





09/26/18 18:04


Consulted Dr. Ryder. Per Dr. Ryder, trend troponin since no ROMAINE or depressions. 





09/26/18 19:06


POC glucose 108. Troponin 0.32 (downtrending), repeat Lact 4.2 ordered another 

500mL fluids. Will admit


Repeat EKG: sinus with PVC and RBBB. No ROMAINE or depressions. Inverted t waves in 

II, aVF, V1, V2, V3








*DC/Admit/Observation/Transfer


Diagnosis at time of Disposition: 


 Lactic acidosis, NSTEMI (non-ST elevated myocardial infarction)





Altered mental status


Qualifiers:


 Altered mental status type: unspecified Qualified Code(s): R41.82 - Altered 

mental status, unspecified








- Discharge Dispostion


Condition at time of disposition: Good


Decision to Admit order: Yes





- Referrals


Referrals: 


Jace Mckee MD [Primary Care Provider] - 





- Patient Instructions





- Post Discharge Activity

## 2018-09-26 NOTE — PDOC
Attending Attestation





- Resident


Resident Name: Sa Theresaira





- ED Attending Attestation


I have performed the following: I have examined & evaluated the patient, The 

case was reviewed & discussed with the resident, I agree w/resident's findings 

& plan





- HPI


HPI: 





09/26/18 19:37





88 YOF with PMH of DM, HTN, HLD, CKD sent to ED by PCP Dr. Mckee, for AMS and 

blurry vision. 


Pt is complaining of "everything, My eyes are dim". 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 

but states that her vision has been "getting darker" for 1-2 weeks now equally 

in both eyes and is not localized to any field of vision. no fevers. +urinary 

frequency. no AP, n/v/, cp or sob.


has had temporal artery biopsy, wnl.











- Physicial Exam


PE: 





09/26/18 19:38





NAD, well appearing, MMM, nl conjunctiva, anicteric; neck supple. lungs clear, 

RRR, abdomen soft nontender. STONER x4, no focal neuro deficits. +peripheral 

edema. normal color for ethnicity, WWP. no calf tenderness.








- Medical Decision Making





09/26/18 16:52





89yo f with PMH of DM, HTN, HLD, CKD presenting with AMS. 


+urinary frequency. 





DDx. UTI, infection/ pneumonia, dehydration, CVA, head bleed, delirium, 

metabolic/electrolyte derangements, ACS, acute on chronic CKD. 


vitals wnl, normotensive. no fever. no acute distress.


at baseline mental status.


labs and lytes remarkable for trop elevation 0.34, EKG with multiple PAC and 

PVCs; trop elevated 0.34. ASA allergy. will trend trops, tele and serial EKGs.





lactic acid elevated. hydrate, and will recheck, which is elevated, unclear 

etiology after fluids and no AP or signs of sepsis/infection


glucose low ~50s, but normal mental status and tolerating PO juice. rechecked 

POCT >100. 


give IVF, asa allergy cannot give ASA for NSTEMIl


CT head_ negative for CVA or bleed.


UA_neg for infection. +leuk esterase, but WBC <10, f/u urine culture.


cards cs with Dr. Ryder, for NSTEMI, trend trops which are stable with 

unchanged EKG. 





dispo: admit for NSTEMI, tele, serial EKG/trops. also AMS workup and management.








09/26/18 19:38





09/26/18 19:39





09/26/18 19:40

## 2018-09-26 NOTE — HP
CHIEF COMPLAINT: low vision, leg swelling





PCP: Rafi





HISTORY OF PRESENT ILLNESS:


This is an 88 year old female with a significant past medical history of DM, HTN

, HLD, CKD presented to the ED with a 1-2 week history of visual "dimming". She 

also reports "everything" is wrong. She denies any chest pain, palpitations, SOB

, abdominal pain. She reports swelling in her lower legs that is chronic but 

may be worsening as she reports that she has not been taking her medications 

due to her visual loss. She c/o constipation, no BM x 2 days.





ER course was notable for:


(1) Troponin 0.34-->0.32


(2) Lactic acid 3.0-->4.2


(3) Creatinine 2.3, baseline 1.7-1.9


(4) platelet 106





Recent Travel:


pt denies





PAST MEDICAL HISTORY:


DM, HTN, HLD, CKD, bradycardia in past


PAST SURGICAL HISTORY:


multiple abdominal surgeries: ? diverticulitis, hysterectomy, appendectomy, 

ventral hernia repair


tonsillectomy


B/L cataracts





Social History:


Smoking: pt denies


Alcohol: occ beer


Drugs: pt denies





Family History:


mother  secondary to rectal CA


father unknown


no siblings





Allergies


diphenhydramine HCl [From Benadryl] Allergy (Severe, Verified 18 13:14)


 anaphyl


aspirin Allergy (Unknown, Verified 18 13:14)


 Swelling


 swelling, hives 


natty flavor Adverse Reaction (Severe, Verified 18 13:14)


 Swelling


 swelling, hives 





HOME MEDICATIONS:


 3





 Medication  Instructions  Recorded


 


Allopurinol [Zyloprim -] 100 mg PO DAILY 14


 


Budesonide/Formeterol Fumarate 2 inh PO BID 14





[SYMBICORT 160/4.5mcg -]  


 


Gabapentin 200 mg PO TID 14


 


Glipizide 5 mg PO DAILY 17


 


Pantoprazole Sodium [Protonix] 40 mg PO DAILY #30 tablet. 18


 


Amlodipine Besylate [Norvasc -] 5 mg PO DAILY #30 tablet 05/10/18


 


Meclizine HCl [Antivert -] 25 mg PO TID PRN #30 tablet 05/10/18


 


Atorvastatin Calcium [Lipitor] 20 mg PO DAILY 18


 


Polyethylene Glycol 3350 [Miralax 17 gm PO DAILY 18





(For Daily Use) -]  


 


Torsemide [Demadex] 100 mg PO DAILY 18


 


Prednisone 23 mg PO TID 18








REVIEW OF SYSTEMS


CONSTITUTIONAL: 


Absent:  fever, chills, diaphoresis, generalized weakness, malaise, loss of 

appetite, weight change


HEENT: 


Absent:  rhinorrhea, nasal congestion, throat pain, throat swelling, difficulty 

swallowing, mouth swelling, ear pain, eye pain, visual changes


CARDIOVASCULAR: 


Absent: chest pain, syncope, palpitations, irregular heart rate, lightheadedness

, peripheral edema


RESPIRATORY: 


Absent: cough, shortness of breath, dyspnea with exertion, orthopnea, wheezing, 

stridor, hemoptysis


GASTROINTESTINAL:


Absent: abdominal pain, abdominal distension, nausea, vomiting, diarrhea, 

constipation, melena, hematochezia


GENITOURINARY: 


Absent: dysuria, frequency, urgency, hesitancy, hematuria, flank pain, genital 

pain


MUSCULOSKELETAL: 


Absent: myalgia, arthralgia, joint swelling, back pain, neck pain


SKIN: 


Absent: rash, itching, pallor


HEMATOLOGIC/IMMUNOLOGIC: 


Absent: easy bleeding, easy bruising, lymphadenopathy, frequent infections


ENDOCRINE:


Absent: unexplained weight gain, unexplained weight loss, heat intolerance, 

cold intolerance


NEUROLOGIC: Present: vsual changes: "dimming" of vision


Absent: headache, focal weakness or paresthesias, dizziness, unsteady gait, 

seizure, mental status changes, bladder or bowel incontinence


PSYCHIATRIC: 


Absent: anxiety, depression, suicidal or homicidal ideation, hallucinations.








PHYSICAL EXAMINATION


Vital Signs - 24 hr





 3





  18





  13:15 15:55 18:45


 


Temperature 98.6 F  


 


Pulse Rate 71  


 


Pulse Rate [  91 H 89





Radial]   


 


Respiratory 18 22 H 20





Rate   


 


Blood Pressure 126/57 L  


 


Blood Pressure  132/57 L 122/57 L





[Left Arm]   


 


O2 Sat by Pulse 99 96 95





Oximetry (%)   








 3





  18





  19:35 19:50


 


Temperature  


 


Pulse Rate  


 


Pulse Rate [  111 H





Radial]  


 


Respiratory  24 H





Rate  


 


Blood Pressure  


 


Blood Pressure  145/79





[Left Arm]  


 


O2 Sat by Pulse 96 96





Oximetry (%)  








GENERAL: Awake, alert, and fully oriented, in no acute distress.


HEAD: Normal with no signs of trauma.


EYES: Pupils equal, round and reactive to light, extraocular movements intact, 

sclera anicteric, conjunctiva clear. No lid lag.


EARS, NOSE, THROAT: Ears normal, nares patent, oropharynx clear without 

exudates. Moist mucous membranes.


NECK: Normal range of motion, supple without lymphadenopathy, JVD, or masses.


LUNGS: Breath sounds equal, clear to auscultation bilaterally. No wheezes, and 

no crackles. No accessory muscle use.


HEART: Regular rate and rhythm, normal S1 and S2 without murmur, rub or gallop.


ABDOMEN: Soft, nontender, not distended, normoactive bowel sounds, no guarding, 

no rebound, no masses.  No hepatomegaly or  splenomegaly. multiple surgical 

scars to abdomen


MUSCULOSKELETAL: Normal range of motion at all joints. No bony deformities or 

tenderness. No CVA tenderness.


UPPER EXTREMITIES: 2+ pulses, warm, well-perfused. No cyanosis. No clubbing. No 

peripheral edema.


LOWER EXTREMITIES: 2+ pulses, warm, well-perfused. No calf tenderness. 3+ 

peripheral edema B/L. 


NEUROLOGICAL:  Cranial nerves II-XII intact. Normal speech.


PSYCHIATRIC: Cooperative. Good eye contact. Appropriate mood and affect.


SKIN: Warm, dry, normal turgor, no rashes or lesions noted, normal capillary 

refill. 





Laboratory Results - last 24 hr





 3





  18





  13:30 14:18 14:18


 


WBC   


 


RBC   


 


Hgb   


 


Hct   


 


MCV   


 


MCH   


 


MCHC   


 


RDW   


 


Plt Count   


 


MPV   


 


Absolute Neuts (auto)   


 


Neutrophils %   


 


Lymphocytes %   


 


Monocytes %   


 


Eosinophils %   


 


Basophils %   


 


Nucleated RBC %   


 


PT with INR   10.80 


 


INR   0.96 


 


PTT (Actin FS)   22.9 L 


 


Sodium    137


 


Potassium    3.7


 


Chloride    100


 


Carbon Dioxide    26


 


Anion Gap    11


 


BUN    57 H


 


Creatinine    2.3 H


 


Creat Clearance w eGFR    20.01


 


Random Glucose    50 L


 


Lactic Acid    3.0 H* 


 


Calcium    9.3


 


Magnesium   2.6 H 


 


Total Bilirubin    0.9


 


AST    46 H


 


ALT    73 H


 


Alkaline Phosphatase    161 H


 


Troponin I  Cancelled   0.34 H


 


Total Protein    6.6


 


Albumin    3.3 L


 


Urine Color   


 


Urine Appearance   


 


Urine pH   


 


Ur Specific Gravity   


 


Urine Protein   


 


Urine Glucose (UA)   


 


Urine Ketones   


 


Urine Blood   


 


Urine Nitrite   


 


Urine Bilirubin   


 


Urine Urobilinogen   


 


Ur Leukocyte Esterase   


 


Urine WBC (Auto)   


 


Urine RBC (Auto)   


 


Ur Epithelial Cells   


 


Urine Bacteria   








 3





  09/26/18 09/26/18 09/26/18





  15:32 18:10 18:10


 


WBC  8.1  


 


RBC  4.53  


 


Hgb  12.9  


 


Hct  39.4  D  


 


MCV  86.8  


 


MCH  28.5  


 


MCHC  32.8  


 


RDW  18.4 H  


 


Plt Count  106 L D  


 


MPV  8.7  


 


Absolute Neuts (auto)  7.2  


 


Neutrophils %  89.0 H  


 


Lymphocytes %  7.1 L D  


 


Monocytes %  3.3 L  


 


Eosinophils %  0.0  D  


 


Basophils %  0.6  D  


 


Nucleated RBC %  0  


 


PT with INR   


 


INR   


 


PTT (Actin FS)   


 


Sodium   


 


Potassium   


 


Chloride   


 


Carbon Dioxide   


 


Anion Gap   


 


BUN   


 


Creatinine   


 


Creat Clearance w eGFR   


 


Random Glucose   


 


Lactic Acid   4.2 H* 


 


Calcium   


 


Magnesium   


 


Total Bilirubin   


 


AST   


 


ALT   


 


Alkaline Phosphatase   


 


Troponin I    0.32 H


 


Total Protein   


 


Albumin   


 


Urine Color   


 


Urine Appearance   


 


Urine pH   


 


Ur Specific Gravity   


 


Urine Protein   


 


Urine Glucose (UA)   


 


Urine Ketones   


 


Urine Blood   


 


Urine Nitrite   


 


Urine Bilirubin   


 


Urine Urobilinogen   


 


Ur Leukocyte Esterase   


 


Urine WBC (Auto)   


 


Urine RBC (Auto)   


 


Ur Epithelial Cells   


 


Urine Bacteria   








 3





Urine Color  Ltyellow   18  18:43    


 


Urine Appearance  Slcloudy   18  18:43    


 


Urine pH  5.0  (5.0-8.0)   18  18:43    


 


Ur Specific Gravity  1.010  (1.001-1.035)   18  18:43    


 


Urine Protein  Negative  (NEGATIVE)   18  18:43    


 


Urine Glucose (UA)  Negative  (NEGATIVE)   18  18:43    


 


Urine Ketones  Negative  (NEGATIVE)   18  18:43    


 


Urine Blood  Negative  (NEGATIVE)   18  18:43    


 


Urine Nitrite  Negative  (NEGATIVE)   18  18:43    


 


Urine Bilirubin  Negative  (<2.0 mg/dL)   18  18:43    


 


Ur Leukocyte Esterase  2+  (NEGATIVE)  H  18  18:43    


 


Urine WBC (Auto)  4 18  18:43    


 


Urine RBC (Auto)  1 18  18:43    


 


Ur Epithelial Cells  Moderate /HPF (FEW)   18  18:43    


 


Urine Bacteria  Rare /hpf (NONE SEEN)   18  18:43    








ECG


18 19:01


SINUS RHYTHM with short WY with frequent and consecutive PVCs


vent rate 87, 


left anterior fasicular block


nonspecific T wave





18 14:11


Sinus rhythm with premature supraventricular complexes and PVC


left axis deviation


RBBB





Radiology Reports


CT head without contrast


Impression: No significant interval change or gross acute intracranial 

pathology is identified. 


Reported By: Delfina Espinosa MD [id number]





Chest xray 


Impression No evidence of CHF, pulmonary infiltrates. Left lung base obscured 

by the cardiac silhouette soft tissues of the chest. 


Reported By: Rebel Polk MD 18 [id number] 








ASSESSMENT/PLAN:


88yF with PMH DM, HTN, HLD, CKD, bradycardia in past presented to the ED with 

visual changes as well as swelling in her legs.





Vision changes


   - CT head negative for acute changes


   - rec neuro consult


   - consider ophtho consult


   - consider MRI





elevated troponin


   - troponin 0.34, 2nd troponin 0.32, trend 3rd


   - monitor on tele


   - [place] consulted, rec trend trops 


   - Allergy to ASA


   - h/o bradycardia, defer metoprolol


B/L LE edema


   - echo ordered for am


   - cont home po meds for now


   - defer further IVF


   - consider d/c amlodipine


   - DOPPLER to r/o DVT, doubtful given swelling B/L and relatively chronic


HTN


   - cont home meds, cardiology consult


HLD 


   - cont home lipitor





PATRIZIA on CKD


   - renal consult





lactic acidosis


   - unclear etiology


   - no s/s sepsis


   - will trend





constipation


   - MOM now


   - cont home miralax





headaches


   - pt is on high dose prednisone presumably for temporal arteritis


   - however, biopsy done on 18 was negative


   - pt reports no further headaches


   - defer prednisone dosing to PCP but would recommend taper to DC.





DVT PPX


   - heparin SC





FEN


   - hold po fluids


   - BMP with trop at 12am


   - diabetic low sodium diet





Dispo: pt currently requires further observation.








Visit type





- Emergency Visit


Emergency Visit: Yes


Care time: The patient presented to the Emergency Department on the above date 

and was hospitalized for further evaluation of their emergent condition.





- New Patient


This patient is new to me today: Yes


Date on this admission: 18





- Critical Care


Critical Care patient: No





Hospitalist Screening





- Colonoscopy Questionnaire


Colonoscopy Questionnaire: 





Colonoscopy Questionnaire








-   Patient:


50 - 75 years old and never had a screening colonoscopy: No


History of colon or rectal polyps, or CA: No


History of IBD, Crohn's disease or UC: No


History of abdominal radiation therapy as a child: No





-   Relative:


1 with colon or rectal CA, or polyps at age 60 or younger: No


Colon or rectal CA diagnosed at age 45 or younger: No


Multiple relatives with colon or rectal CA: No





-   Outcome:


Screening Result: Negative Screen

## 2018-09-27 LAB
ANION GAP SERPL CALC-SCNC: 11 MMOL/L (ref 8–16)
ANION GAP SERPL CALC-SCNC: 15 MMOL/L (ref 8–16)
BASOPHILS # BLD: 0.3 % (ref 0–2)
BUN SERPL-MCNC: 50 MG/DL (ref 7–18)
BUN SERPL-MCNC: 55 MG/DL (ref 7–18)
CALCIUM SERPL-MCNC: 8.5 MG/DL (ref 8.5–10.1)
CALCIUM SERPL-MCNC: 8.5 MG/DL (ref 8.5–10.1)
CHLORIDE SERPL-SCNC: 101 MMOL/L (ref 98–107)
CHLORIDE SERPL-SCNC: 104 MMOL/L (ref 98–107)
CO2 SERPL-SCNC: 23 MMOL/L (ref 21–32)
CO2 SERPL-SCNC: 27 MMOL/L (ref 21–32)
CREAT SERPL-MCNC: 1.8 MG/DL (ref 0.55–1.3)
CREAT SERPL-MCNC: 2.2 MG/DL (ref 0.55–1.3)
DEPRECATED RDW RBC AUTO: 18.1 % (ref 11.6–15.6)
EOSINOPHIL # BLD: 0 % (ref 0–4.5)
GLUCOSE SERPL-MCNC: 142 MG/DL (ref 74–106)
GLUCOSE SERPL-MCNC: 157 MG/DL (ref 74–106)
HCT VFR BLD CALC: 31.9 % (ref 32.4–45.2)
HGB BLD-MCNC: 10.6 GM/DL (ref 10.7–15.3)
LYMPHOCYTES # BLD: 5.4 % (ref 8–40)
MAGNESIUM SERPL-MCNC: 2.5 MG/DL (ref 1.8–2.4)
MCH RBC QN AUTO: 28.9 PG (ref 25.7–33.7)
MCHC RBC AUTO-ENTMCNC: 33.2 G/DL (ref 32–36)
MCV RBC: 87 FL (ref 80–96)
MONOCYTES # BLD AUTO: 3.4 % (ref 3.8–10.2)
NEUTROPHILS # BLD: 90.9 % (ref 42.8–82.8)
PHOSPHATE SERPL-MCNC: 3.6 MG/DL (ref 2.5–4.9)
PLATELET # BLD AUTO: 75 K/MM3 (ref 134–434)
PMV BLD: 8.6 FL (ref 7.5–11.1)
POTASSIUM SERPLBLD-SCNC: 3.2 MMOL/L (ref 3.5–5.1)
POTASSIUM SERPLBLD-SCNC: 3.3 MMOL/L (ref 3.5–5.1)
RBC # BLD AUTO: 3.67 M/MM3 (ref 3.6–5.2)
SODIUM SERPL-SCNC: 139 MMOL/L (ref 136–145)
SODIUM SERPL-SCNC: 141 MMOL/L (ref 136–145)
WBC # BLD AUTO: 6.1 K/MM3 (ref 4–10)

## 2018-09-27 RX ADMIN — INSULIN ASPART SCH UNIT: 100 INJECTION, SOLUTION INTRAVENOUS; SUBCUTANEOUS at 17:12

## 2018-09-27 RX ADMIN — INSULIN ASPART SCH: 100 INJECTION, SOLUTION INTRAVENOUS; SUBCUTANEOUS at 21:08

## 2018-09-27 RX ADMIN — PANTOPRAZOLE SODIUM SCH MG: 40 TABLET, DELAYED RELEASE ORAL at 09:32

## 2018-09-27 RX ADMIN — CEFTRIAXONE SCH MLS/HR: 1 INJECTION, POWDER, FOR SOLUTION INTRAMUSCULAR; INTRAVENOUS at 15:20

## 2018-09-27 RX ADMIN — BUDESONIDE AND FORMOTEROL FUMARATE DIHYDRATE SCH PUFF: 160; 4.5 AEROSOL RESPIRATORY (INHALATION) at 09:32

## 2018-09-27 RX ADMIN — INSULIN ASPART SCH UNIT: 100 INJECTION, SOLUTION INTRAVENOUS; SUBCUTANEOUS at 11:31

## 2018-09-27 RX ADMIN — ALLOPURINOL SCH MG: 100 TABLET ORAL at 09:32

## 2018-09-27 RX ADMIN — AMLODIPINE BESYLATE SCH MG: 5 TABLET ORAL at 09:32

## 2018-09-27 RX ADMIN — BUDESONIDE AND FORMOTEROL FUMARATE DIHYDRATE SCH PUFF: 160; 4.5 AEROSOL RESPIRATORY (INHALATION) at 21:11

## 2018-09-27 RX ADMIN — PREDNISONE SCH: 10 TABLET ORAL at 13:13

## 2018-09-27 RX ADMIN — TORSEMIDE SCH MG: 100 TABLET ORAL at 10:24

## 2018-09-27 RX ADMIN — PREDNISONE SCH MG: 20 TABLET ORAL at 21:09

## 2018-09-27 RX ADMIN — POLYETHYLENE GLYCOL 3350 SCH: 17 POWDER, FOR SOLUTION ORAL at 09:32

## 2018-09-27 RX ADMIN — BUDESONIDE AND FORMOTEROL FUMARATE DIHYDRATE SCH PUFF: 160; 4.5 AEROSOL RESPIRATORY (INHALATION) at 01:33

## 2018-09-27 RX ADMIN — ATORVASTATIN CALCIUM SCH MG: 20 TABLET, FILM COATED ORAL at 21:09

## 2018-09-27 RX ADMIN — GABAPENTIN SCH MG: 100 CAPSULE ORAL at 21:09

## 2018-09-27 RX ADMIN — INSULIN ASPART SCH UNIT: 100 INJECTION, SOLUTION INTRAVENOUS; SUBCUTANEOUS at 06:17

## 2018-09-27 RX ADMIN — GABAPENTIN SCH MG: 100 CAPSULE ORAL at 05:47

## 2018-09-27 RX ADMIN — PREDNISONE SCH MG: 10 TABLET ORAL at 05:47

## 2018-09-27 RX ADMIN — HEPARIN SODIUM SCH UNIT: 5000 INJECTION, SOLUTION INTRAVENOUS; SUBCUTANEOUS at 13:14

## 2018-09-27 RX ADMIN — HEPARIN SODIUM SCH UNIT: 5000 INJECTION, SOLUTION INTRAVENOUS; SUBCUTANEOUS at 05:47

## 2018-09-27 RX ADMIN — Medication SCH DROP: at 22:58

## 2018-09-27 RX ADMIN — GABAPENTIN SCH MG: 100 CAPSULE ORAL at 13:13

## 2018-09-27 NOTE — PN
Progress Note, Physician


Chief Complaint: 





Son at bedside


Pt known to me from previous admissions


She is AAOx3 


As per son, she is getting forgetful since about a month


She has been getting Prednisone for temporal arteritis for headaches and vision 

changes - negative temporal artery biopsy, but ESR was elevated in office- now 

decreased as per PMD


Pt had consulted with ophthalmologist outpt 


Son states she can not stay home alone











- Current Medication List


Current Medications: 


Active Medications





Allopurinol (Zyloprim -)  100 mg PO DAILY [facility]


   Last Admin: 09/27/18 09:32 Dose:  100 mg


Amlodipine Besylate (Norvasc -)  5 mg PO DAILY [facility]


   Last Admin: 09/27/18 09:32 Dose:  5 mg


Atorvastatin Calcium (Lipitor -)  20 mg PO HS [facility]


   Last Admin: 09/26/18 23:50 Dose:  20 mg


Budesonide/Formoterol Fumarate (Symbicort 160/4.5mcg -)  2 puff IH BID [facility]


   Last Admin: 09/27/18 09:32 Dose:  2 puff


Gabapentin (Neurontin -)  200 mg PO TID [facility]


   Last Admin: 09/27/18 13:13 Dose:  200 mg


Insulin Aspart (Novolog Vial Sliding Scale -)  1 vial SQ ACHS [facility]; Protocol


   Last Admin: 09/27/18 11:31 Dose:  3 unit


Pantoprazole Sodium (Protonix -)  40 mg PO DAILY [facility]


   Last Admin: 09/27/18 09:32 Dose:  40 mg


Polyethylene Glycol (Miralax (For Daily Use) -)  17 gm PO DAILY [facility]


   Last Admin: 09/27/18 09:32 Dose:  Not Given


Prednisone (Deltasone -)  20 mg PO BID [facility]


Torsemide (Demadex -)  100 mg PO DAILY [facility]


   Last Admin: 09/27/18 10:24 Dose:  100 mg











- Objective


Vital Signs: 


 Vital Signs











Temperature  98 F   09/27/18 10:00


 


Pulse Rate  70   09/27/18 10:00


 


Respiratory Rate  18   09/27/18 10:00


 


Blood Pressure  145/73   09/27/18 10:00


 


O2 Sat by Pulse Oximetry (%)  97   09/27/18 10:00











Constitutional: Yes: No Distress, Calm


Cardiovascular: Yes: Regular Rate and Rhythm, Murmur


Respiratory: Yes: Diminished


Gastrointestinal: Yes: Normal Bowel Sounds, Soft, Abdomen, Obese.  No: 

Tenderness


Edema: Yes


Edema: LLE: 2+, RLE: 2+


Neurological: Yes: Alert, Tremors, Other (forgetful)


Labs: 


 CBC, BMP





 09/27/18 05:30 





 09/27/18 05:30 





 INR, PTT











INR  0.96  (0.83-1.09)   09/26/18  14:18    














Problem List





- Problems


(1) Thrombocytopenia


Code(s): D69.6 - THROMBOCYTOPENIA, UNSPECIFIED   





(2) Altered mental status


Code(s): R41.82 - ALTERED MENTAL STATUS, UNSPECIFIED   


Qualifiers: 


   Altered mental status type: unspecified   Qualified Code(s): R41.82 - 

Altered mental status, unspecified   





(3) Lactic acidosis


Code(s): E87.2 - ACIDOSIS   





(4) NSTEMI (non-ST elevated myocardial infarction)


Code(s): I21.4 - NON-ST ELEVATION (NSTEMI) MYOCARDIAL INFARCTION   





(5) PATRIZIA (acute kidney injury)


Code(s): N17.9 - ACUTE KIDNEY FAILURE, UNSPECIFIED   





Assessment/Plan





PLAN


Noted decreased platelets-- dc Heparin sc for now


UA has leucocyte-- will start empiric iv Ceftriaxone till culture results


Cardiology eval noted


 renal eval called 


Will consult Neurology


CT head negative


continue with meds


PO prednisone BID

## 2018-09-27 NOTE — EKG
Test Reason : 

Blood Pressure : ***/*** mmHG

Vent. Rate : 093 BPM     Atrial Rate : 093 BPM

   P-R Int : 000 ms          QRS Dur : 118 ms

    QT Int : 368 ms       P-R-T Axes : 055 -67 073 degrees

   QTc Int : 457 ms

 

SINUS RHYTHM WITH PREMATURE SUPRAVENTRICULAR COMPLEXES AND PREMATURE

VENTRICULAR COMPLEXES OR FUSION COMPLEXES

LEFT AXIS DEVIATION

RIGHT BUNDLE BRANCH BLOCK

ABNORMAL ECG

WHEN COMPARED WITH ECG OF 20-AUG-2018 18:17,

FUSION COMPLEXES ARE NOW PRESENT

PREMATURE VENTRICULAR COMPLEXES ARE NOW PRESENT

PREMATURE SUPRAVENTRICULAR COMPLEXES ARE NOW PRESENT

VENT. RATE HAS INCREASED BY  33 BPM

RIGHT BUNDLE BRANCH BLOCK IS NOW PRESENT

Confirmed by CHRISTOPHER PERALES MD ([id number]) on 9/27/2018 9:37:48 AM

 

Referred By:             Confirmed By:CHRISTOPHER PERALES MD

## 2018-09-27 NOTE — EKG
Test Reason : 

Blood Pressure : ***/*** mmHG

Vent. Rate : 087 BPM     Atrial Rate : 087 BPM

   P-R Int : 110 ms          QRS Dur : 080 ms

    QT Int : 388 ms       P-R-T Axes : 044 -60 113 degrees

   QTc Int : 466 ms

 

*** POOR DATA QUALITY, INTERPRETATION MAY BE ADVERSELY AFFECTED

SINUS RHYTHM WITH FREQUENT and consecutive PREMATURE VENTRICULAR

COMPLEXES

LEFT ANTERIOR FASCICULAR BLOCK

RIGHT BUNDLE BRANCH BLOCK



ABNORMAL ECG

Confirmed by ANGELLA CAMPBELL, CHRISTOPHER ([id number]) on 9/27/2018 9:36:28 AM

 

Referred By:             Confirmed By:CHRISTOPHER PERALES MD

## 2018-09-27 NOTE — ECHO
______________________________________________________________________________



Name: LING CORRALES                               Exam:Adult Echocardiogram

MRN: [id number]         Study Date: 09/27/2018 08:04 AM

Age: 88 yrs

______________________________________________________________________________



Reason For Study: EDEMA

Height: 60 in        Weight: 196 lb        BSA: 1.9 m2



______________________________________________________________________________



MMode/2D Measurements & Calculations

Ao root diam: 2.6 cm





______________________________________________________________________________

Procedure

A complete two-dimensional transthoracic echocardiogram was performed (2D, M-mode, Doppler and color 
flow

Doppler).

Left Ventricle

The left ventricular size, thickness and function are normal. The left ventricular ejection fraction 
is

normal. Ejection Fraction = 60-65%. The left ventricular wall motion is normal.

Right Ventricle

The right ventricle is normal in size and function.

Atria

Normal left and right atrial size and function.

Mitral Valve

There is no mitral regurgitation noted.

Tricuspid Valve

There is trace tricuspid regurgitation. There was insufficient TR detected to calculate RV systolic p
ressure.

Aortic Valve

The aortic valve is trileaflet. No hemodynamically significant valvular aortic stenosis. No aortic

regurgitation is present.

Pulmonic Valve

There is no pulmonic valvular regurgitation.

Great Vessels

The aortic root is normal size.

Pericardium/Pleura

There is no pericardial effusion.



______________________________________________________________________________



Interpretation Summary

The left ventricular size, thickness and function are normal.

The right ventricle is normal in size and function.

There is trace tricuspid regurgitation.







MD James Torrez 09/27/2018 11:38 AM

## 2018-09-27 NOTE — PN
Progress Note (short form)





- Note


Progress Note: 





Renal Consult for PATRIZIA on CKD





This is a 88 year old AA woman with hx of non-proteinuric CKD stage 3B (

baseline Cr 1.6-1.7), Hypertension, HLD, Diastolic HF, COPD, Anemia, Chronic LE 

edema gout, diverticulosis presents from home with complaints of vision 

changes. Pt reports that she has not been taking all her medications because of 

her vision changes. Denies any abd pain, N/V/D. No dysuria or frequency. Denies 

any flank pain or hematuira. Pt was given ~1L of fluid in the ED as per MAR. 





PMhx: as above


Allergies: as listed in EMR


Family Hx: NC


Social Hx: No T/A/D


ROS: as per HPI, all other pertinent ros negative





 Home Medications











 Medication  Instructions  Recorded


 


Allopurinol [Zyloprim -] 100 mg PO DAILY 07/19/14


 


Budesonide/Formeterol Fumarate 2 inh PO BID 07/19/14





[SYMBICORT 160/4.5mcg -]  


 


Gabapentin 200 mg PO TID 07/19/14


 


Glipizide 5 mg PO DAILY 09/09/17


 


Pantoprazole Sodium [Protonix] 40 mg PO DAILY #30 tablet. 02/19/18


 


Amlodipine Besylate [Norvasc -] 5 mg PO DAILY #30 tablet 05/10/18


 


Meclizine HCl [Antivert -] 25 mg PO TID PRN #30 tablet 05/10/18


 


Atorvastatin Calcium [Lipitor] 20 mg PO DAILY 08/20/18


 


Polyethylene Glycol 3350 [Miralax 17 gm PO DAILY 08/20/18





(For Daily Use) -]  


 


Torsemide [Demadex] 100 mg PO DAILY 08/20/18


 


Prednisone 23 mg PO TID 09/04/18








 Vital Signs











Temperature  98 F   09/27/18 10:00


 


Pulse Rate  70   09/27/18 10:00


 


Respiratory Rate  18   09/27/18 10:00


 


Blood Pressure  145/73   09/27/18 10:00


 


O2 Sat by Pulse Oximetry (%)  97   09/27/18 10:00








 Intake & Output











 09/24/18 09/25/18 09/26/18 09/27/18





 23:59 23:59 23:59 23:59


 


Intake Total   10 


 


Balance   10 


 


Weight   88.904 kg 











 CBC, BMP





 09/27/18 05:30 





 09/27/18 05:30 





 Current Medications





Allopurinol (Zyloprim -)  100 mg PO DAILY [facility]


   Last Admin: 09/27/18 09:32 Dose:  100 mg


Amlodipine Besylate (Norvasc -)  5 mg PO DAILY [facility]


   Last Admin: 09/27/18 09:32 Dose:  5 mg


Atorvastatin Calcium (Lipitor -)  20 mg PO HS [facility]


   Last Admin: 09/26/18 23:50 Dose:  20 mg


Budesonide/Formoterol Fumarate (Symbicort 160/4.5mcg -)  2 puff IH BID [facility]


   Last Admin: 09/27/18 09:32 Dose:  2 puff


Gabapentin (Neurontin -)  200 mg PO TID [facility]


   Last Admin: 09/27/18 13:13 Dose:  200 mg


Ceftriaxone Sodium 1,000 mg/ (Dextrose)  50 mls @ 100 mls/hr IVPB DAILY [facility]


Insulin Aspart (Novolog Vial Sliding Scale -)  1 vial SQ ACHS [facility]; Protocol


   Last Admin: 09/27/18 11:31 Dose:  3 unit


Pantoprazole Sodium (Protonix -)  40 mg PO DAILY [facility]


   Last Admin: 09/27/18 09:32 Dose:  40 mg


Polyethylene Glycol (Miralax (For Daily Use) -)  17 gm PO DAILY [facility]


   Last Admin: 09/27/18 09:32 Dose:  Not Given


Prednisone (Deltasone -)  20 mg PO BID [facility]


Torsemide (Demadex -)  100 mg PO DAILY [facility]


   Last Admin: 09/27/18 10:24 Dose:  100 mg





88 year old AA woman with hx of non-proteinuric CKD stage 3B (baseline Cr 1.6-

1.7), Hypertension, HLD, Diastolic HF, COPD, Anemia, Chronic LE edema gout, 

diverticulosis presents from home with complaints of vision changes. 





 #Vision change with clinical hx of temporal arteritis 


#PATRIZIA on CKD now improved 


#Anemia


#Hypokalemia 


#Hypertension 





Pt is currently on steroid taper for temporal arteritis 


Work up of vision changes as per primary


Renal function improved to near normal s/p IVF in the ER


restarted on oral diuretics, continue for now, trend weights and edema 


Trend CBC, check iron studies, no indication for SHANNAN or transfusion 


trend K s/p oral KCL supplementation 


Continue Amlodipine, Torsemide, goal BP < 140/90


Avoid nephrotoxins or IV contrast 


Trend renal function and electrolytes 





Thank you 


Will follow





Rodrigo Wyatt DO

## 2018-09-27 NOTE — CON.CARD
Cardiology Consult (text)





- Consultation


Consultation Note: 


CC: vision changes





hpi:  87 yo f with h/o HTN, hl, dchf vs venous insufficiency, DM, CKD, COPD, 

anemia, hyperkalemia,  gout, diverticulosis/gastritis/gib, who p/w vision 

changes.  Past 2 mos has noticed progressive dimming of vision.  Also 

headaches.  No cp, sob, palps dizzy loc pnd orthopnea.  Chronic le edema 

stable.  Sees me for cardio.





pmhx/pshx: per phi:  Appendectomy, Breast Biopsy, Cataract Removal, Colonoscopy 

(diverticular bleed 2016, right colon polyp removed remotely ), Hernia Repair, 

Hysterectomy, Oopherectomy, Tonsillectomy, Upper Endoscopy (2016 with 

esophageal candidiasis, gastritis and duodenitis)


social hx:   Never smoked


fam hx: no premature cad, scd


ros: per hpi; no nvd cough gib hematuria dysuria wt loss muscle pain





Ambulatory Orders


 Home Medications











 Medication  Instructions  Recorded


 


Allopurinol [Zyloprim -] 100 mg PO DAILY 07/19/14


 


Budesonide/Formeterol Fumarate 2 inh PO BID 07/19/14





[SYMBICORT 160/4.5mcg -]  


 


Gabapentin 200 mg PO TID 07/19/14


 


Glipizide 5 mg PO DAILY 09/09/17


 


Pantoprazole Sodium [Protonix] 40 mg PO DAILY #30 tablet. 02/19/18


 


Amlodipine Besylate [Norvasc -] 5 mg PO DAILY #30 tablet 05/10/18


 


Meclizine HCl [Antivert -] 25 mg PO TID PRN #30 tablet 05/10/18


 


Atorvastatin Calcium [Lipitor] 20 mg PO DAILY 08/20/18


 


Polyethylene Glycol 3350 [Miralax 17 gm PO DAILY 08/20/18





(For Daily Use) -]  


 


Torsemide [Demadex] 100 mg PO DAILY 08/20/18


 


Prednisone 23 mg PO TID 09/04/18








 Vital Signs











 Period  Temp  Pulse  Resp  BP Sys/Boothe  Pulse Ox


 


 Last 24 Hr  97.4 F-98.6 F    18-24  107-145/50-79  95-99











nad, calm


jvd flat, neck supple


ctab, nl effort


rrr nl s1, s2 no mrg


+ bs soft nt nd, obese


trace le edema.  no c/c


+ dp/pt, no carotid bruits


aaox3


no jaundice, diaphoresis








 


 Laboratory Last Values











WBC  6.1 K/mm3 (4.0-10.0)   09/27/18  05:30    


 


RBC  3.67 M/mm3 (3.60-5.2)   09/27/18  05:30    


 


Hgb  10.6 GM/dL (10.7-15.3)  L  09/27/18  05:30    


 


Hct  31.9 % (32.4-45.2)  L D 09/27/18  05:30    


 


MCV  87.0 fl (80-96)   09/27/18  05:30    


 


MCH  28.9 pg (25.7-33.7)   09/27/18  05:30    


 


MCHC  33.2 g/dl (32.0-36.0)   09/27/18  05:30    


 


RDW  18.1 % (11.6-15.6)  H  09/27/18  05:30    


 


Plt Count  75 K/MM3 (134-434)  L D 09/27/18  05:30    


 


MPV  8.6 fl (7.5-11.1)   09/27/18  05:30    


 


Absolute Neuts (auto)  5.6 K/mm3 (1.5-8.0)   09/27/18  05:30    


 


Neutrophils %  90.9 % (42.8-82.8)  H  09/27/18  05:30    


 


Lymphocytes %  5.4 % (8-40)  L D 09/27/18  05:30    


 


Monocytes %  3.4 % (3.8-10.2)  L  09/27/18  05:30    


 


Eosinophils %  0.0 % (0-4.5)   09/27/18  05:30    


 


Basophils %  0.3 % (0-2.0)   09/27/18  05:30    


 


Nucleated RBC %  0 % (0-0)   09/27/18  05:30    


 


PT with INR  10.80 SEC (9.7-13.0)   09/26/18  14:18    


 


INR  0.96  (0.83-1.09)   09/26/18  14:18    


 


PTT (Actin FS)  22.9 SECONDS (25.2-36.5)  L  09/26/18  14:18    


 


Sodium  141 mmol/L (136-145)   09/27/18  05:30    


 


Potassium  3.3 mmol/L (3.5-5.1)  L  09/27/18  05:30    


 


Chloride  104 mmol/L ()   09/27/18  05:30    


 


Carbon Dioxide  23 mmol/L (21-32)   09/27/18  05:30    


 


Anion Gap  15 MMOL/L (8-16)   09/27/18  05:30    


 


BUN  50 mg/dL (7-18)  H  09/27/18  05:30    


 


Creatinine  1.8 mg/dL (0.55-1.3)  H  09/27/18  05:30    


 


Creat Clearance w eGFR  26.55  (>60)   09/27/18  05:30    


 


POC Glucometer  152 UNITS ()   09/27/18  05:28    


 


Random Glucose  142 mg/dL ()  H  09/27/18  05:30    


 


Lactic Acid  2.0 mmol/L (0.4-2.0)   09/27/18  00:01    


 


Calcium  8.5 mg/dL (8.5-10.1)   09/27/18  05:30    


 


Phosphorus  3.6 mg/dL (2.5-4.9)   09/27/18  05:30    


 


Magnesium  2.5 mg/dL (1.8-2.4)  H  09/27/18  05:30    


 


Total Bilirubin  0.9 mg/dL (0.2-1)   09/26/18  14:18    


 


AST  46 U/L (15-37)  H  09/26/18  14:18    


 


ALT  73 U/L (13-61)  H  09/26/18  14:18    


 


Alkaline Phosphatase  161 U/L ()  H  09/26/18  14:18    


 


Creatine Kinase  68 IU/L ()   09/27/18  00:01    


 


Troponin I  0.32 ng/ml (0.00-0.05)  H  09/27/18  00:01    


 


Total Protein  6.6 g/dl (6.4-8.2)   09/26/18  14:18    


 


Albumin  3.3 g/dl (3.4-5.0)  L  09/26/18  14:18    


 


Urine Color  Ltyellow   09/26/18  18:43    


 


Urine Appearance  Slcloudy   09/26/18  18:43    


 


Urine pH  5.0  (5.0-8.0)   09/26/18  18:43    


 


Ur Specific Gravity  1.010  (1.001-1.035)   09/26/18  18:43    


 


Urine Protein  Negative  (NEGATIVE)   09/26/18  18:43    


 


Urine Glucose (UA)  Negative  (NEGATIVE)   09/26/18  18:43    


 


Urine Ketones  Negative  (NEGATIVE)   09/26/18  18:43    


 


Urine Blood  Negative  (NEGATIVE)   09/26/18  18:43    


 


Urine Nitrite  Negative  (NEGATIVE)   09/26/18  18:43    


 


Urine Bilirubin  Negative  (<2.0 mg/dL)   09/26/18  18:43    


 


Urine Urobilinogen  Negative mg/dL (0.2-1.0)   09/26/18  18:43    


 


Ur Leukocyte Esterase  2+  (NEGATIVE)  H  09/26/18  18:43    


 


Urine WBC (Auto)  4 /hpf (3-5)   09/26/18  18:43    


 


Urine RBC (Auto)  1 /hpf (0-3)   09/26/18  18:43    


 


Ur Epithelial Cells  Moderate /HPF (FEW)   09/26/18  18:43    


 


Urine Bacteria  Rare /hpf (NONE SEEN)   09/26/18  18:43    











 


ekg:  sr, pacs, pvcs, rbbb





cxr: no congestion/infiltrates





tele:  sr, occ pvcs





head ct: wnl





echo 12/2017 :nl lv/rv size/fn.  impaired relaxation.  1+ lae. nl valves.  nl 

ivc.  no rvsp. 





holter 12/2016: avg HR 56 bpm.  no bradyarrhythmias.  no sig pauses.  1% pvc 

burden.  one run of psvt (4 beats).  





stress test 2/2018: No Ekg changes.  No ischemia.  nl perf/EF. 


stress test 2014: no ekg changes, sx's. bline hr 52 bpm.  nl perfusion/EF. 








A/P: 87 yo f with h/o HTN, hl, dchf vs venous insufficiency, DM, CKD, COPD, 

anemia, hyperkalemia,  gout, diverticulosis/gastritis/gib, who p/w vision 

changes. 





vision changes:


-plans per PMD





bradycardia


- has longstanding hx of bradycardia, asx.  Patient had multiple Holter 

monitors for evaluation of bradycardia in past --> no sig bradyarrhythmias.  


  


htn


- cont norvasc 





venous insufficiency


- cont home dose torsemide 100 qd





hld:


-cont statin





ckd


- cr stable





elevated trops:


-borderline trop elevation with flat trend, nl ck, and similar to prior 

baseline values-->not consistent with acs





cardiac wise stable

## 2018-09-28 LAB
ALBUMIN SERPL-MCNC: 2.8 G/DL (ref 3.4–5)
ALP SERPL-CCNC: 129 U/L (ref 45–117)
ALT SERPL-CCNC: 52 U/L (ref 13–61)
ANION GAP SERPL CALC-SCNC: 7 MMOL/L (ref 8–16)
AST SERPL-CCNC: 26 U/L (ref 15–37)
BASOPHILS # BLD: 0.1 % (ref 0–2)
BILIRUB SERPL-MCNC: 0.6 MG/DL (ref 0.2–1)
BUN SERPL-MCNC: 54 MG/DL (ref 7–18)
CALCIUM SERPL-MCNC: 8.8 MG/DL (ref 8.5–10.1)
CHLORIDE SERPL-SCNC: 100 MMOL/L (ref 98–107)
CO2 SERPL-SCNC: 27 MMOL/L (ref 21–32)
CREAT SERPL-MCNC: 2.2 MG/DL (ref 0.55–1.3)
DEPRECATED RDW RBC AUTO: 18.1 % (ref 11.6–15.6)
EOSINOPHIL # BLD: 1.1 % (ref 0–4.5)
GLUCOSE SERPL-MCNC: 203 MG/DL (ref 74–106)
HCT VFR BLD CALC: 34.7 % (ref 32.4–45.2)
HGB BLD-MCNC: 11.2 GM/DL (ref 10.7–15.3)
LYMPHOCYTES # BLD: 4.8 % (ref 8–40)
MAGNESIUM SERPL-MCNC: 2.7 MG/DL (ref 1.8–2.4)
MCH RBC QN AUTO: 28.5 PG (ref 25.7–33.7)
MCHC RBC AUTO-ENTMCNC: 32.2 G/DL (ref 32–36)
MCV RBC: 88.4 FL (ref 80–96)
MONOCYTES # BLD AUTO: 4.2 % (ref 3.8–10.2)
NEUTROPHILS # BLD: 89.8 % (ref 42.8–82.8)
PHOSPHATE SERPL-MCNC: 3.6 MG/DL (ref 2.5–4.9)
PLATELET # BLD AUTO: 75 K/MM3 (ref 134–434)
PMV BLD: 8.7 FL (ref 7.5–11.1)
POTASSIUM SERPLBLD-SCNC: 4.2 MMOL/L (ref 3.5–5.1)
PROT SERPL-MCNC: 5.6 G/DL (ref 6.4–8.2)
RBC # BLD AUTO: 3.92 M/MM3 (ref 3.6–5.2)
SODIUM SERPL-SCNC: 134 MMOL/L (ref 136–145)
WBC # BLD AUTO: 5.8 K/MM3 (ref 4–10)

## 2018-09-28 RX ADMIN — GABAPENTIN SCH MG: 100 CAPSULE ORAL at 21:42

## 2018-09-28 RX ADMIN — BUDESONIDE AND FORMOTEROL FUMARATE DIHYDRATE SCH PUFF: 160; 4.5 AEROSOL RESPIRATORY (INHALATION) at 21:49

## 2018-09-28 RX ADMIN — INSULIN ASPART SCH UNIT: 100 INJECTION, SOLUTION INTRAVENOUS; SUBCUTANEOUS at 21:42

## 2018-09-28 RX ADMIN — CEFTRIAXONE SCH MLS/HR: 1 INJECTION, POWDER, FOR SOLUTION INTRAMUSCULAR; INTRAVENOUS at 09:45

## 2018-09-28 RX ADMIN — POLYETHYLENE GLYCOL 3350 SCH GM: 17 POWDER, FOR SOLUTION ORAL at 09:44

## 2018-09-28 RX ADMIN — ATORVASTATIN CALCIUM SCH MG: 20 TABLET, FILM COATED ORAL at 21:42

## 2018-09-28 RX ADMIN — GABAPENTIN SCH MG: 100 CAPSULE ORAL at 14:10

## 2018-09-28 RX ADMIN — Medication SCH DROP: at 21:50

## 2018-09-28 RX ADMIN — TORSEMIDE SCH MG: 100 TABLET ORAL at 09:44

## 2018-09-28 RX ADMIN — PANTOPRAZOLE SODIUM SCH MG: 40 TABLET, DELAYED RELEASE ORAL at 09:44

## 2018-09-28 RX ADMIN — INSULIN ASPART SCH UNIT: 100 INJECTION, SOLUTION INTRAVENOUS; SUBCUTANEOUS at 17:29

## 2018-09-28 RX ADMIN — INSULIN ASPART SCH UNIT: 100 INJECTION, SOLUTION INTRAVENOUS; SUBCUTANEOUS at 11:42

## 2018-09-28 RX ADMIN — BUDESONIDE AND FORMOTEROL FUMARATE DIHYDRATE SCH PUFF: 160; 4.5 AEROSOL RESPIRATORY (INHALATION) at 09:56

## 2018-09-28 RX ADMIN — PREDNISONE SCH MG: 20 TABLET ORAL at 21:42

## 2018-09-28 RX ADMIN — INSULIN ASPART SCH UNIT: 100 INJECTION, SOLUTION INTRAVENOUS; SUBCUTANEOUS at 06:31

## 2018-09-28 RX ADMIN — AMLODIPINE BESYLATE SCH MG: 5 TABLET ORAL at 09:43

## 2018-09-28 RX ADMIN — ALLOPURINOL SCH MG: 100 TABLET ORAL at 09:43

## 2018-09-28 RX ADMIN — PREDNISONE SCH MG: 20 TABLET ORAL at 09:44

## 2018-09-28 RX ADMIN — GABAPENTIN SCH MG: 100 CAPSULE ORAL at 06:29

## 2018-09-28 NOTE — PN
Progress Note (short form)





- Note


Progress Note: 


Pt seen/ examined


chart reviewed


sitting in chair


says she feels much better.


denies headache/ vision issues 








 Vital Signs











Temp  98 F   09/28/18 09:00


 


Pulse  90   09/28/18 09:00


 


Resp  18   09/28/18 09:00


 


BP  155/68   09/28/18 09:00


 


Pulse Ox  96   09/28/18 09:00








 Intake & Output











 09/27/18 09/27/18 09/28/18





 11:59 23:59 11:59


 


Intake Total  350 50


 


Balance  350 50


 


Intake:   


 


  Oral  350 50


 


Other:   


 


  Voiding Method Toilet Toilet Toilet


 


  # Unmeasured Voids   


 


    Void 2  1








Active Medications





Allopurinol (Zyloprim -)  100 mg PO DAILY [facility]


   Last Admin: 09/28/18 09:43 Dose:  100 mg


Amlodipine Besylate (Norvasc -)  5 mg PO DAILY [facility]


   Last Admin: 09/28/18 09:43 Dose:  5 mg


Atorvastatin Calcium (Lipitor -)  20 mg PO HS [facility]


   Last Admin: 09/27/18 21:09 Dose:  20 mg


Budesonide/Formoterol Fumarate (Symbicort 160/4.5mcg -)  2 puff IH BID [facility]


   Last Admin: 09/28/18 09:56 Dose:  2 puff


Gabapentin (Neurontin -)  200 mg PO TID [facility]


   Last Admin: 09/28/18 06:29 Dose:  200 mg


Ceftriaxone Sodium 1 gm/ (Dextrose)  50 mls @ 100 mls/hr IVPB DAILY [facility]


   Last Admin: 09/28/18 09:45 Dose:  100 mls/hr


Insulin Aspart (Novolog Vial Sliding Scale -)  1 vial SQ ACHS [facility]; Protocol


   Last Admin: 09/28/18 06:31 Dose:  3 unit


Non-Formulary Medication (Travoprost [Travatan Z])  1 drop OU HS [facility]


   Last Admin: 09/27/18 22:58 Dose:  1 drop


Pantoprazole Sodium (Protonix -)  40 mg PO DAILY [facility]


   Last Admin: 09/28/18 09:44 Dose:  40 mg


Polyethylene Glycol (Miralax (For Daily Use) -)  17 gm PO DAILY [facility]


   Last Admin: 09/28/18 09:44 Dose:  17 gm


Prednisone (Deltasone -)  20 mg PO BID [facility]


   Last Admin: 09/28/18 09:44 Dose:  20 mg


Torsemide (Demadex -)  100 mg PO DAILY [facility]


   Last Admin: 09/28/18 09:44 Dose:  100 mg





 CBC, BMP





 09/28/18 06:20 





 09/28/18 06:20 





 Microbiology











 09/26/18 18:43 Urine Culture - Final





 Urine - Urine Clean Catch    Contaminated: Please Repeat








- Objective


Vital Signs: 


 Vital Signs











Temperature  98 F   09/27/18 10:00


 


Pulse Rate  70   09/27/18 10:00


 


Respiratory Rate  18   09/27/18 10:00


 


Blood Pressure  145/73   09/27/18 10:00


 


O2 Sat by Pulse Oximetry (%)  97   09/27/18 10:00














physical 


Constitutional: Yes: No Distress, Calm


Cardiovascular: Yes: Regular Rate and Rhythm, Murmur


Respiratory: Yes: Diminished


Gastrointestinal: Yes: Normal Bowel Sounds, Soft, Abdomen, Obese.  No: 

Tenderness


Edema: Yes


Edema: decreased 


Neurological: Yes: Alert, awake 











Problem List





- Problems


(1) Thrombocytopenia


Code(s): D69.6 - THROMBOCYTOPENIA, UNSPECIFIED   





(2) Altered mental status


Code(s): R41.82 - ALTERED MENTAL STATUS, UNSPECIFIED   


Qualifiers: 


   Altered mental status type: unspecified   Qualified Code(s): R41.82 - 

Altered mental status, unspecified   





(3) Lactic acidosis


Code(s): E87.2 - ACIDOSIS   





(4) NSTEMI (non-ST elevated myocardial infarction)


Code(s): I21.4 - NON-ST ELEVATION (NSTEMI) MYOCARDIAL INFARCTION   





(5) PATRIZIA (acute kidney injury)


Code(s): N17.9 - ACUTE KIDNEY FAILURE, UNSPECIFIED   





Assessment/Plan





Clinically better


continue present care


rheumatology consult


CT head negative


continue with meds


PO prednisone BID


physical therapy


abx today


d/c planning


Pt alert/ awake -- dont want to go for str- wants to go home


Opthalmology f/u as out pt


Anticipate d/c tomorrow with vns


Discussed with 


d/c tele

## 2018-09-28 NOTE — PN
Progress Note (short form)





- Note


Progress Note: 


s: no cp sob palps dizzy





o:


 Vital Signs











 Period  Temp  Pulse  Resp  BP Sys/Boothe  Pulse Ox


 


 Last 24 Hr  97.3 F-98.4 F  69-90  18-20  113-155/62-73  96-97








nad, calm


jvd flat, neck supple


ctab, nl effort


rrr nl s1, s2 no mrg


+ bs soft nt nd, obese


trace le edema.  no c/c


aaox3


no jaundice, diaphoresis





 Current Medications











Generic Name Dose Route Start Last Admin





  Trade Name Freenriqueta  PRN Reason Stop Dose Admin


 


Allopurinol  100 mg  09/27/18 10:00  09/28/18 09:43





  Zyloprim -  PO   100 mg





  DAILY [facility]   Administration





     





     





     





     


 


Amlodipine Besylate  5 mg  09/27/18 10:00  09/28/18 09:43





  Norvasc -  PO   5 mg





  DAILY [facility]   Administration





     





     





     





     


 


Atorvastatin Calcium  20 mg  09/26/18 22:00  09/27/18 21:09





  Lipitor -  PO   20 mg





  HS [facility]   Administration





     





     





     





     


 


Budesonide/Formoterol Fumarate  2 puff  09/26/18 22:00  09/28/18 09:56





  Symbicort 160/4.5mcg -  IH   2 puff





  BID [facility]   Administration





     





     





     





     


 


Gabapentin  200 mg  09/26/18 22:00  09/28/18 06:29





  Neurontin -  PO   200 mg





  TID [facility]   Administration





     





     





     





     


 


Ceftriaxone Sodium 1 gm/  50 mls @ 100 mls/hr  09/27/18 13:45  09/28/18 09:45





  Dextrose  IVPB   100 mls/hr





  DAILY [facility]   Administration





     





     





     





     


 


Insulin Aspart  1 vial  09/26/18 22:00  09/28/18 06:31





  Novolog Vial Sliding Scale -  SQ   3 unit





  [facility]   Administration





     





     





  Protocol   





     


 


Non-Formulary Medication  1 drop  09/27/18 23:00  09/27/18 22:58





  Travoprost [Travatan Z]  OU   1 drop





  HS [facility]   Administration





     





     





     





     


 


Pantoprazole Sodium  40 mg  09/27/18 10:00  09/28/18 09:44





  Protonix -  PO   40 mg





  DAILY [facility]   Administration





     





     





     





     


 


Polyethylene Glycol  17 gm  09/27/18 10:00  09/28/18 09:44





  Miralax (For Daily Use) -  PO   17 gm





  DAILY [facility]   Administration





     





     





     





     


 


Prednisone  20 mg  09/27/18 22:00  09/28/18 09:44





  Deltasone -  PO   20 mg





  BID [facility]   Administration





     





     





     





     


 


Torsemide  100 mg  09/27/18 10:00  09/28/18 09:44





  Demadex -  PO   100 mg





  DAILY [facility]   Administration





     





     





     





     








 CBC, BMP





 09/28/18 06:20 





 09/28/18 06:20 














ekg:  sr, pacs, pvcs, rbbb





cxr: no congestion/infiltrates





tele:  sr, occ pvcs





head ct: wnl





echo 12/2017 :nl lv/rv size/fn.  impaired relaxation.  1+ lae. nl valves.  nl 

ivc.  no rvsp. 





echo 9/2018:  nl lv/rv, no sig valve path





holter 12/2016: avg HR 56 bpm.  no bradyarrhythmias.  no sig pauses.  1% pvc 

burden.  one run of psvt (4 beats).  





stress test 2/2018: No Ekg changes.  No ischemia.  nl perf/EF. 


stress test 2014: no ekg changes, sx's. bline hr 52 bpm.  nl perfusion/EF. 








A/P: 89 yo f with h/o HTN, hl, dchf vs venous insufficiency, DM, CKD, COPD, 

anemia, hyperkalemia,  gout, diverticulosis/gastritis/gib, who p/w vision 

changes. 





vision changes:


-plans per PMD





bradycardia


- has longstanding hx of bradycardia, asx.  Patient had multiple Holter 

monitors for evaluation of bradycardia in past --> no sig bradyarrhythmias.  


  


htn


- cont norvasc 





venous insufficiency


- cont home dose torsemide 100 qd





hld:


-cont statin





ckd


- cr stable





elevated trops:


-borderline trop elevation with flat trend, nl ck, and similar to prior 

baseline values-->not consistent with acs





cardiac wise stable

## 2018-09-28 NOTE — CONSULT
Consult


Consult Specialty:: Rheumatology.





- History of Present Illness


History of Present Illness: 


88 year old female with history of probable temporal arteritis, glaucoma, DM, 

HTN, HLD, CKD admitted with subjective changes in vision.





HPI the patient has a long term history of glaucoma and decreased vision in the 

right eye.   On 18 she was seen by her PCP (Dr. Mckee) with a 3 days 

history of excruciating headaches.  Laboratory work-up revealed an ESR of 120.  

She was started on Prednisone 60 mg/d and referred for temporal artery biopsy, 

however the biopsy was done after 2 weeks and reported as negative for 

vasculitis.   The headache resolved and subsequent ESR was 28.  She was 

followed up by her ophtalmologist (Dr. Gibbs) who found no changes in his 

exam and there were no changes suggestive of ischemia secondary to temporal 

arteritis.  Prednisone was tapered down to 40 mg/d.  The patient reports she 

has been feeling well however has diminished vision.  With further questioning, 

she reports that regarding her vision she has good and bad days but probably no 

changes and she denies headache or joint pain.


Laboratory work-up revealed, on admission platelets of 106 and dropped to 75.  

Creatinine 2.2,  Urinalysis with LE 1+ and no blood or protein.  ESR was no 

done.








- History Source


History Provided By: Patient, Medical Record





- Past Medical History


CNS: Yes: Other (chronic headache with negative temporal artery biopsy)


Cardio/Vascular: Yes: CHF, HTN, Hyperlipdemia


Pulmonary: Yes: COPD


Gastrointestinal: Yes: Constipation, Diverticulosis, Gastritis, GI Bleed, 

Hemorrhoids, Other (colon polyp , ventral incisional hernias, esophageal 

candidiasis )


Renal/: Yes: Renal Inusuff (diabetic nephropathy)


Endocrine: Yes: Diabetes Mellitus, Other (diabetic retinopathy and nephropathy)





- Past Surgical History


Past Surgical History: Yes: Appendectomy, Breast Biopsy, Cataract Removal, 

Colonoscopy (diverticular bleed 2016, right colon polyp removed remotely ), 

Hernia Repair, Hysterectomy, Oopherectomy, Tonsillectomy, Upper Endoscopy (2016 

with esophageal candidiasis, gastritis and duodenitis)





- Alcohol/Substance Use


Hx Alcohol Use: No


History of Substance Use: reports: None





- Smoking History


Smoking history: Never smoked


Have you smoked in the past 12 months: No


Aproximately how many cigarettes per day: 0





- Social History


Usual Living Arrangement: Alone


ADL: Independent


Occupation: retired 


History of Recent Travel: No





Home Medications





- Allergies


Allergies/Adverse Reactions: 


 Allergies











Allergy/AdvReac Type Severity Reaction Status Date / Time


 


diphenhydramine HCl Allergy Severe anaphyl Verified 18 13:14





[From Benadryl]     


 


aspirin Allergy Unknown Swelling Verified 18 13:14


 


natty flavor AdvReac Severe Swelling Verified 18 13:14














- Home Medications


Home Medications: 


Ambulatory Orders





Allopurinol [Zyloprim -] 100 mg PO DAILY 14 


Budesonide/Formeterol Fumarate [SYMBICORT 160/4.5mcg -] 2 inh PO BID 14 


Gabapentin 200 mg PO TID 14 


Glipizide 5 mg PO DAILY 17 


Pantoprazole Sodium [Protonix] 40 mg PO DAILY #30 tablet. 18 


Amlodipine Besylate [Norvasc -] 5 mg PO DAILY #30 tablet 05/10/18 


Meclizine HCl [Antivert -] 25 mg PO TID PRN #30 tablet 05/10/18 


Atorvastatin Calcium [Lipitor] 20 mg PO DAILY 18 


Polyethylene Glycol 3350 [Miralax (For Daily Use) -] 17 gm PO DAILY 18 


Torsemide [Demadex] 100 mg PO DAILY 18 


Prednisone 23 mg PO TID 18 


Travoprost [Travatan Z] 1 drop OU HS 18 











Family Disease History





- Family Disease History


Family Disease History: Heart Disease: Father ( MI in his 50's), CA: Mother 

(rectal cancer)





Review of Systems





- Review of Systems


Constitutional: reports: No Symptoms


Eyes: reports: Other (See HPI)


HENT: reports: No Symptoms


Neck: reports: No Symptoms


Cardiovascular: reports: No Symptoms


Respiratory: reports: No Symptoms


Gastrointestinal: reports: No Symptoms


Musculoskeletal: reports: No Symptoms


Integumentary: reports: No Symptoms





Physical Exam


Vital Signs: 


 Vital Signs











Temperature  98.6 F   18 14:00


 


Pulse Rate  84   18 14:00


 


Respiratory Rate  20   18 14:00


 


Blood Pressure  126/74   18 14:00


 


O2 Sat by Pulse Oximetry (%)  96   18 09:00











Labs: 


 CBC, BMP





 18 06:20 





 18 06:20 





 Laboratory Tests











  18





  18:43 06:20


 


Total Bilirubin   0.6


 


AST   26


 


ALT   52


 


Alkaline Phosphatase   129 H


 


Total Protein   5.6 L


 


Albumin   2.8 L


 


Urine Color  Ltyellow 


 


Urine Appearance  Slcloudy 


 


Urine pH  5.0 


 


Ur Specific Gravity  1.010 


 


Urine Protein  Negative 


 


Urine Glucose (UA)  Negative 


 


Urine Ketones  Negative 


 


Urine Blood  Negative 


 


Urine Nitrite  Negative 


 


Urine Bilirubin  Negative 


 


Urine Urobilinogen  Negative 


 


Ur Leukocyte Esterase  2+ H 


 


Urine WBC (Auto)  4 


 


Urine RBC (Auto)  1 


 


Ur Epithelial Cells  Moderate 


 


Urine Bacteria  Rare 














Problem List





- Problems


(1) Temporal arteritis


Assessment/Plan: 


Probable temporal arteritis.   Even though the temporal artery biopsy was 

negative we cannot rule out temporal arteritis, With Prednisone the headache 

resolved, ESR improved and probably there have been no changes in her vision.  

The patient has chronic decreased vision in the right eye, probably related to 

Glaucoma.  She has subjective decreased vision, however it is unlikley  to have 

progressed and iut is unlikely t be related to temporal arteritis or other 

acute process.


Plan:  ESR and CRP.   As she has Glaucoma, Prednisone should continue be 

tapered as outpatient.











Code(s): M31.6 - OTHER GIANT CELL ARTERITIS   





(2) Thrombocytopenia


Assessment/Plan: 


Etiology to be determined.


Plan I requested consult to Dr. Carmen


Code(s): D69.6 - THROMBOCYTOPENIA, UNSPECIFIED

## 2018-09-28 NOTE — CON.NEURO
Consult





- Past Medical History


CNS: Yes: Other (chronic headache with negative temporal artery biopsy)


Cardio/Vascular: Yes: CHF, HTN, Hyperlipdemia


Pulmonary: Yes: COPD


Gastrointestinal: Yes: Constipation, Diverticulosis, Gastritis, GI Bleed, 

Hemorrhoids, Other (colon polyp , ventral incisional hernias, esophageal 

candidiasis )


Renal/: Yes: Renal Inusuff (diabetic nephropathy)


Endocrine: Yes: Diabetes Mellitus, Other (diabetic retinopathy and nephropathy)





- Past Surgical History


Past Surgical History: Yes: Appendectomy, Breast Biopsy, Cataract Removal, 

Colonoscopy (diverticular bleed , right colon polyp removed remotely ), 

Hernia Repair, Hysterectomy, Oopherectomy, Tonsillectomy, Upper Endoscopy (2016 

with esophageal candidiasis, gastritis and duodenitis)





- Alcohol/Substance Use


Hx Alcohol Use: No


History of Substance Use: reports: None





- Smoking History


Smoking history: Never smoked


Have you smoked in the past 12 months: No


Aproximately how many cigarettes per day: 0





- Social History


Usual Living Arrangement: Alone


ADL: Independent


Occupation: retired 


History of Recent Travel: No





Home Medications





- Allergies


Allergies/Adverse Reactions: 


 Allergies











Allergy/AdvReac Type Severity Reaction Status Date / Time


 


diphenhydramine HCl Allergy Severe anaphyl Verified 18 13:14





[From Benadryl]     


 


aspirin Allergy Unknown Swelling Verified 18 13:14


 


natty flavor AdvReac Severe Swelling Verified 18 13:14














- Home Medications


Home Medications: 


Ambulatory Orders





Allopurinol [Zyloprim -] 100 mg PO DAILY 14 


Budesonide/Formeterol Fumarate [SYMBICORT 160/4.5mcg -] 2 inh PO BID 14 


Gabapentin 200 mg PO TID 14 


Glipizide 5 mg PO DAILY 17 


Pantoprazole Sodium [Protonix] 40 mg PO DAILY #30 tablet. 18 


Amlodipine Besylate [Norvasc -] 5 mg PO DAILY #30 tablet 05/10/18 


Meclizine HCl [Antivert -] 25 mg PO TID PRN #30 tablet 05/10/18 


Atorvastatin Calcium [Lipitor] 20 mg PO DAILY 18 


Polyethylene Glycol 3350 [Miralax (For Daily Use) -] 17 gm PO DAILY 18 


Torsemide [Demadex] 100 mg PO DAILY 18 


Prednisone 23 mg PO TID 18 


Travoprost [Travatan Z] 1 drop OU HS 18 











Family Disease History





- Family Disease History


Family Disease History: Heart Disease: Father ( MI in his 50's), CA: Mother 

(rectal cancer)





Physical Exam-Neuro


Vital Signs: 


 Vital Signs











Temperature  97.9 F   18 05:00


 


Pulse Rate  69   18 05:00


 


Respiratory Rate  20   18 05:00


 


Blood Pressure  148/73   18 05:00


 


O2 Sat by Pulse Oximetry (%)  97   18 21:00











Labs: 


 CBC, BMP





 18 06:20 





 18 06:20 





 INR, PTT











INR  0.96  (0.83-1.09)   18  14:18    














Assessment/Plan


cc Episodic confusion


HPI 88 year old female history of DM,HTN,HLD,CKD. She has been diagnosed with 

temporal arteritis. She is seeing eye doctor outpatient. She lives alone and 

able to take care of herself. She has not been confused except few times. She 

is getting PT today and oriented x 3. 


She was diagnosed with temporal arteritis and is on high dose of steroid, her 

biopsy was negative. 


She could not tell me if she is seeing a rheumatologist. As per patient her 

vision has not been worse recently.





PAST MEDICAL HISTORY:


DM, HTN, HLD, CKD, bradycardia in past


PAST SURGICAL HISTORY:


multiple abdominal surgeries: ? diverticulitis, hysterectomy, appendectomy, 

ventral hernia repair


tonsillectomy


B/L cataracts





Social Hx: No toxic habits,





Social History:


Smoking: pt denies


Alcohol: occ beer


Drugs: pt denies





Family History:


mother  secondary to rectal CA


father unknown


no siblings





Allergies


diphenhydramine HCl [From Benadryl] Allergy (Severe, Verified 18 13:14)


 anaphyl


aspirin Allergy (Unknown, Verified 18 13:14)


 Swelling


 swelling, hives 


natty flavor Adverse Reaction (Severe, Verified 18 13:14)


 Swelling


 swelling, hives 





HOME MEDICATIONS:


 3





 Medication  Instructions  Recorded


 


Allopurinol [Zyloprim -] 100 mg PO DAILY 14


 


Budesonide/Formeterol Fumarate 2 inh PO BID 14





[SYMBICORT 160/4.5mcg -]  


 


Gabapentin 200 mg PO TID 14


 


Glipizide 5 mg PO DAILY 17


 


Pantoprazole Sodium [Protonix] 40 mg PO DAILY #30 tablet. 18


 


Amlodipine Besylate [Norvasc -] 5 mg PO DAILY #30 tablet 05/10/18


 


Meclizine HCl [Antivert -] 25 mg PO TID PRN #30 tablet 05/10/18


 


Atorvastatin Calcium [Lipitor] 20 mg PO DAILY 18


 


Polyethylene Glycol 3350 [Miralax 17 gm PO DAILY 18





(For Daily Use) -]  


 


Torsemide [Demadex] 100 mg PO DAILY 18


 


Prednisone 23 mg PO TID 18














Neurological Examiantion


Alert oriented x 3, speech is normal


eomi, pupils is reactive, no temporal tenderness,


she could count fingers at three fit, perception to light is normal


vf is normal to confrontation


strength is normal 5/5 all ext, she has limited shoulder movement 








ct head unremarkable








Assessment: 1. episode confusion, it could be begining of cognitive dysfunction

, she is fully functional at this time and may need outpatient follow up for [facility]

/early Dementia. suggest to do b12,folate tsh, ct head reviewed





2. Temporal arteritis , her headhace and esr has improved after steroid, I 

suggest to repeat esr and have rheumatologsit consult as she may need to reduce 

dose fo prednisone , and optho consult if can be obtained in hospital





Thanking you so much


Kalpesh Blair md

## 2018-09-28 NOTE — PN
Teaching Attending Note


Name of Resident: Armando Davis





ATTENDING PHYSICIAN STATEMENT





I saw and evaluated the patient.


I reviewed the resident's note and discussed the case with the resident.


I agree with the resident's findings and plan as documented.








SUBJECTIVE:Patient  seen with Dr. allison 


Agree with consult as outlined 








OBJECTIVE:








ASSESSMENT AND PLAN:

## 2018-09-28 NOTE — PN
Progress Note (short form)





- Note


Progress Note: 





Renal Follow up for PATRIZIA on CKD 





Pt seen and examined at the bedside


no acute complaints


edema persists on lower portion of leg


no sob, cp, abd pain 


 Vital Signs











Temperature  98.6 F   09/28/18 14:00


 


Pulse Rate  84   09/28/18 14:00


 


Respiratory Rate  20   09/28/18 14:00


 


Blood Pressure  126/74   09/28/18 14:00


 


O2 Sat by Pulse Oximetry (%)  96   09/28/18 09:00








 Intake & Output











 09/25/18 09/26/18 09/27/18 09/28/18





 23:59 23:59 23:59 23:59


 


Intake Total  10 350 50


 


Balance  10 350 50


 


Weight  88.904 kg  








NAD


CTA


+ edema 1/4 up the leg 





 CBC, BMP





 09/28/18 06:20 





 09/28/18 06:20 


 Laboratory Tests











  09/28/18 09/28/18 09/28/18





  06:20 06:20 06:20


 


Calcium  8.8  


 


Phosphorus  3.6  


 


Magnesium  2.7 H  


 


Iron   Pending 


 


TIBC   Pending 


 


Iron Saturation   Pending 


 


Ferritin    964.8 H











 Current Medications





Allopurinol (Zyloprim -)  100 mg PO DAILY [facility]


   Last Admin: 09/28/18 09:43 Dose:  100 mg


Amlodipine Besylate (Norvasc -)  5 mg PO DAILY [facility]


   Last Admin: 09/28/18 09:43 Dose:  5 mg


Atorvastatin Calcium (Lipitor -)  20 mg PO HS [facility]


   Last Admin: 09/27/18 21:09 Dose:  20 mg


Budesonide/Formoterol Fumarate (Symbicort 160/4.5mcg -)  2 puff IH BID [facility]


   Last Admin: 09/28/18 09:56 Dose:  2 puff


Gabapentin (Neurontin -)  200 mg PO TID [facility]


   Last Admin: 09/28/18 14:10 Dose:  200 mg


Ceftriaxone Sodium 1 gm/ (Dextrose)  50 mls @ 100 mls/hr IVPB DAILY [facility]


   Last Admin: 09/28/18 09:45 Dose:  100 mls/hr


Insulin Aspart (Novolog Vial Sliding Scale -)  1 vial SQ ACHS [facility]; Protocol


   Last Admin: 09/28/18 11:42 Dose:  2 unit


Non-Formulary Medication (Travoprost [Travatan Z])  1 drop OU HS [facility]


   Last Admin: 09/27/18 22:58 Dose:  1 drop


Pantoprazole Sodium (Protonix -)  40 mg PO DAILY [facility]


   Last Admin: 09/28/18 09:44 Dose:  40 mg


Polyethylene Glycol (Miralax (For Daily Use) -)  17 gm PO DAILY [facility]


   Last Admin: 09/28/18 09:44 Dose:  17 gm


Prednisone (Deltasone -)  20 mg PO BID [facility]


   Last Admin: 09/28/18 09:44 Dose:  20 mg











88 year old AA woman with hx of non-proteinuric CKD stage 3B (baseline Cr 1.6-

1.7), Hypertension, HLD, Diastolic HF, COPD, Anemia, Chronic LE edema gout, 

diverticulosis presents from home with complaints of vision changes. 





 #Vision change with clinical hx of temporal arteritis 


#PATRIZIA on CKD now improved 


#Anemia


#Hypokalemia 


#Hypertension 





BUN/Cr worsening in last 24 hours w/o overt hypotension or nephrotoxin exposure 


no significant peripheral eosinophila to suggest AIN 


Likely related to mild intravascular volume depletion, may need to hold or 

decrease diuretic dose going forward


will hold AM torsemide for now 


BP at goal, continue present meds except diuretics


monitor volume status


trend renal function and electrolytes








Rodrigo Wyatt DO

## 2018-09-28 NOTE — CONSULT
Consultation: 


REQUESTING PROVIDER: Amor





CONSULT REQUEST: We have been asked to medically evaluate this patient for 

thrombocytopenia. 





HISTORY OF PRESENT ILLNESS:


88 year old female with history of probable temporal arteritis, glaucoma, DM, 

HTN, HLD, CKD admitted with subjective changes in vision. Temporal artery 

biopsy was done and was negative. However it was done while patient was on 

steroids. We have been consulted for  new onset thrombocytopenia. In review of 

past records patient plt count normal through 8/30/18. On admission current plt 

count 106,000 , which has fallen to [id number] since admission. Patient denies 

recent illnesses or change in medication over this period of time. Has been on 

high dose of steroids which has been tapered to 40mg daily for presumptive 

diagnosis of temporal arteritis. 





PMhx: As above


PSHx: multiple abdominal surgeries and temporal artery biopsy. 


Family Hx: mother with rectal Ca. 


Social Hx:never , never smoked, social beer drinker, no drugs. no 

industrial expose to intoxicants. 


Allergies: ASA , ENOCH.








REVIEW OF SYSTEMS:


CONSTITUTIONAL: generalized weakness


Absent:  fever, chills, diaphoresis, , malaise, loss of appetite, weight change


HEENT: visual changes


Absent:  rhinorrhea, nasal congestion, throat pain, throat swelling, difficulty 

swallowing, mouth swelling, ear pain, eye pain, 


CARDIOVASCULAR: 


Absent: chest pain, syncope, palpitations, irregular heart rate, lightheadedness

, peripheral edema


RESPIRATORY: 


Absent: cough, shortness of breath, dyspnea with exertion, orthopnea, wheezing, 

stridor, hemoptysis


GASTROINTESTINAL:constipation


Absent: abdominal pain, abdominal distension, nausea, vomiting, diarrhea, , 

melena, hematochezia


GENITOURINARY: 


Absent: dysuria, frequency, urgency, hesitancy, hematuria, flank pain, genital 

pain


MUSCULOSKELETAL:  joint swelling, decreased ROM of shoulders for past several 

months. 


Absent: myalgia, arthralgia,, back pain, neck pain


SKIN: 


Absent: rash, itching, pallor


HEMATOLOGIC/IMMUNOLOGIC: 


Absent: easy bleeding, easy bruising, lymphadenopathy, frequent infections


ENDOCRINE:


Absent: unexplained weight gain, unexplained weight loss, heat intolerance, 

cold intolerance


NEUROLOGIC: headache


Absent: , focal weakness or paresthesias, dizziness, unsteady gait, seizure, 

mental status changes, bladder or bowel incontinence


PSYCHIATRIC: 


Absent: anxiety, depression, suicidal or homicidal ideation, hallucinations.





PHYSICAL EXAMINATION





 Vital Signs - 24 hr











  09/27/18 09/28/18 09/28/18





  21:00 01:00 05:00


 


Temperature  97.3 F L 97.9 F


 


Pulse Rate  76 69


 


Respiratory 20 20 20





Rate   


 


Blood Pressure  138/68 148/73


 


O2 Sat by Pulse 97  





Oximetry (%)   














  09/28/18 09/28/18 09/28/18





  09:00 14:00 17:00


 


Temperature 98 F 98.6 F 97.7 F


 


Pulse Rate 90 84 83


 


Respiratory 18 20 18





Rate   


 


Blood Pressure 155/68 126/74 116/64


 


O2 Sat by Pulse 96  





Oximetry (%)   














GENERAL: Awake and alert, NAD


HEAD: NCAT


EARS, NOSE, THROAT: Moist mucous membranes.


NECK:Supple without lymphadenopathy, JVD, or masses.


LUNGS: CTAB. No wheezes, and no crackles. No accessory muscle use.


Breast: without masses, dimpling, or retraction. surgical scar on lower 

inferior aspect of left breast. 


HEART: RRR, normal S1 and S2 without murmur, rub or gallop.


ABDOMEN: Soft, nontender, not distended, normoactive bowel sounds, no guarding, 

no rebound, no masses. multiple surgical scars and ventral hernia.  


MUSCULOSKELETAL: Decreased ROM in shoulders R>L. 


UPPER EXTREMITIES: 2+ pulses, warm, well-perfused. No cyanosis. No clubbing. . 

No peripheral edema.


LOWER EXTREMITIES: 2+ pulses, warm, well-perfused. 3+ peripheral edema. 


PSYCHIATRIC: Cooperative. Good eye contact. Appropriate mood and affect.


SKIN: echymotic right buttuck area. 











 Laboratory Results - last 24 hr











  09/27/18 09/28/18 09/28/18





  21:07 06:20 06:20


 


WBC   5.8 


 


RBC   3.92 


 


Hgb   11.2 


 


Hct   34.7 


 


MCV   88.4 


 


MCH   28.5 


 


MCHC   32.2 


 


RDW   18.1 H 


 


Plt Count   75 L 


 


MPV   8.7 


 


Absolute Neuts (auto)   5.2 


 


Neutrophils %   89.8 H 


 


Lymphocytes %   4.8 L 


 


Monocytes %   4.2 


 


Eosinophils %   1.1  D 


 


Basophils %   0.1 


 


Nucleated RBC %   0 


 


Sodium    134 L


 


Potassium    4.2


 


Chloride    100


 


Carbon Dioxide    27


 


Anion Gap    7 L


 


BUN    54 H


 


Creatinine    2.2 H


 


Creat Clearance w eGFR    21.06


 


POC Glucometer  116  


 


Random Glucose    203 H


 


Calcium    8.8


 


Phosphorus    3.6


 


Magnesium    2.7 H


 


Ferritin   


 


Total Bilirubin    0.6


 


AST    26


 


ALT    52


 


Alkaline Phosphatase    129 H


 


Total Protein    5.6 L


 


Albumin    2.8 L


 


Vitamin B12    409


 


Serum Folate    7














  09/28/18 09/28/18 09/28/18





  06:20 06:27 11:40


 


WBC   


 


RBC   


 


Hgb   


 


Hct   


 


MCV   


 


MCH   


 


MCHC   


 


RDW   


 


Plt Count   


 


MPV   


 


Absolute Neuts (auto)   


 


Neutrophils %   


 


Lymphocytes %   


 


Monocytes %   


 


Eosinophils %   


 


Basophils %   


 


Nucleated RBC %   


 


Sodium   


 


Potassium   


 


Chloride   


 


Carbon Dioxide   


 


Anion Gap   


 


BUN   


 


Creatinine   


 


Creat Clearance w eGFR   


 


POC Glucometer   223  183


 


Random Glucose   


 


Calcium   


 


Phosphorus   


 


Magnesium   


 


Ferritin  964.8 H  


 


Total Bilirubin   


 


AST   


 


ALT   


 


Alkaline Phosphatase   


 


Total Protein   


 


Albumin   


 


Vitamin B12   


 


Serum Folate   














  09/28/18





  17:27


 


WBC 


 


RBC 


 


Hgb 


 


Hct 


 


MCV 


 


MCH 


 


MCHC 


 


RDW 


 


Plt Count 


 


MPV 


 


Absolute Neuts (auto) 


 


Neutrophils % 


 


Lymphocytes % 


 


Monocytes % 


 


Eosinophils % 


 


Basophils % 


 


Nucleated RBC % 


 


Sodium 


 


Potassium 


 


Chloride 


 


Carbon Dioxide 


 


Anion Gap 


 


BUN 


 


Creatinine 


 


Creat Clearance w eGFR 


 


POC Glucometer  313


 


Random Glucose 


 


Calcium 


 


Phosphorus 


 


Magnesium 


 


Ferritin 


 


Total Bilirubin 


 


AST 


 


ALT 


 


Alkaline Phosphatase 


 


Total Protein 


 


Albumin 


 


Vitamin B12 


 


Serum Folate 








Active Medications











Generic Name Dose Route Start Last Admin





  Trade Name [place]  PRN Reason Stop Dose Admin


 


Allopurinol  100 mg  09/27/18 10:00  09/28/18 09:43





  Zyloprim -  PO   100 mg





  DAILY ELLA   Administration





     





     





     





     


 


Amlodipine Besylate  5 mg  09/27/18 10:00  09/28/18 09:43





  Norvasc -  PO   5 mg





  DAILY ELLA   Administration





     





     





     





     


 


Atorvastatin Calcium  20 mg  09/26/18 22:00  09/27/18 21:09





  Lipitor -  PO   20 mg





  HS [facility]LL[facility]   Administration





     





     





     





     


 


Budesonide/Formoterol Fumarate  2 puff  09/26/18 22:00  09/28/18 09:56





  Symbicort 160/4.5mcg -  IH   2 puff





  BID ELLA   Administration





     





     





     





     


 


Gabapentin  200 mg  09/26/18 22:00  09/28/18 14:10





  Neurontin -  PO   200 mg





  TID ELLA   Administration





     





     





     





     


 


Ceftriaxone Sodium 1 gm/  50 mls @ 100 mls/hr  09/27/18 13:45  09/28/18 09:45





  Dextrose  IVPB   100 mls/hr





  DAILY ELLA   Administration





     





     





     





     


 


Insulin Aspart  1 vial  09/26/18 22:00  09/28/18 17:29





  Novolog Vial Sliding Scale -  SQ   6 unit





  [facility]   Administration





     





     





  Protocol   





     


 


Non-Formulary Medication  1 drop  09/27/18 23:00  09/27/18 22:58





  Travoprost [Travatan Z]  OU   1 drop





  HS ELLA   Administration





     





     





     





     


 


Pantoprazole Sodium  40 mg  09/27/18 10:00  09/28/18 09:44





  Protonix -  PO   40 mg





  DAILY ELLA   Administration





     





     





     





     


 


Polyethylene Glycol  17 gm  09/27/18 10:00  09/28/18 09:44





  Miralax (For Daily Use) -  PO   17 gm





  DAILY ELLA   Administration





     





     





     





     


 


Prednisone  20 mg  09/27/18 22:00  09/28/18 09:44





  Deltasone -  PO   20 mg





  BID ELLA   Administration





     





     





     





     











ASSESSMENT/PLAN:


88 year old female with history of probable temporal arteritis, glaucoma, DM, 

HTN, HLD, CKD admitted with subjective changes in vision.








Dispo: We will continue to follow the patient. Thank you for this consultative 

opportunity.





Problem List





- Problems


(1) Thrombocytopenia


Assessment/Plan: 


Etiology unclear


* normal B12 and folate


* previously normal plt dating back to 8/30/18


* No recent change in medication or illness. 


* current meds reviewed and no obvious medicine are etiologic.


* Ceftriaxone may be contribute to initial fall from 106k to current 77K


* Normal WBC and relatively normal HCT on admission against underlying primary 

hematologic disorder. 


* Would consider TSH, LALITA, RF, and liver/spleen sono as part of initial 

assessment. 








Visit type





- Emergency Visit


Emergency Visit: Yes


ED Registration Date: 09/27/18


Care time: The patient presented to the Emergency Department on the above date 

and was hospitalized for further evaluation of their emergent condition.





- New Patient


This patient is new to me today: Yes


Date on this admission: 09/28/18





- Critical Care


Critical Care patient: No

## 2018-09-29 LAB
ANION GAP SERPL CALC-SCNC: 11 MMOL/L (ref 8–16)
BILIRUB DIRECT SERPL-MCNC: 413 U/L (ref 84–246)
BUN SERPL-MCNC: 57 MG/DL (ref 7–18)
CALCIUM SERPL-MCNC: 8.8 MG/DL (ref 8.5–10.1)
CHLORIDE SERPL-SCNC: 100 MMOL/L (ref 98–107)
CO2 SERPL-SCNC: 24 MMOL/L (ref 21–32)
CREAT SERPL-MCNC: 2.3 MG/DL (ref 0.55–1.3)
GLUCOSE SERPL-MCNC: 284 MG/DL (ref 74–106)
MAGNESIUM SERPL-MCNC: 2.6 MG/DL (ref 1.8–2.4)
PHOSPHATE SERPL-MCNC: 3 MG/DL (ref 2.5–4.9)
POTASSIUM SERPLBLD-SCNC: 4.4 MMOL/L (ref 3.5–5.1)
SERUM IRON SATURATION: 26 % (ref 15–55)
SODIUM SERPL-SCNC: 134 MMOL/L (ref 136–145)
TIBC SERPL-MCNC: 195 UG/DL (ref 250–450)
UIBC SERPL-MCNC: 144 UG/DL (ref 118–369)

## 2018-09-29 RX ADMIN — INSULIN ASPART SCH UNITS: 100 INJECTION, SOLUTION INTRAVENOUS; SUBCUTANEOUS at 16:53

## 2018-09-29 RX ADMIN — INSULIN ASPART SCH UNITS: 100 INJECTION, SOLUTION INTRAVENOUS; SUBCUTANEOUS at 21:48

## 2018-09-29 RX ADMIN — CEFTRIAXONE SCH MLS/HR: 1 INJECTION, POWDER, FOR SOLUTION INTRAMUSCULAR; INTRAVENOUS at 09:48

## 2018-09-29 RX ADMIN — GABAPENTIN SCH MG: 100 CAPSULE ORAL at 06:12

## 2018-09-29 RX ADMIN — POLYETHYLENE GLYCOL 3350 SCH GM: 17 POWDER, FOR SOLUTION ORAL at 09:51

## 2018-09-29 RX ADMIN — BUDESONIDE AND FORMOTEROL FUMARATE DIHYDRATE SCH PUFF: 160; 4.5 AEROSOL RESPIRATORY (INHALATION) at 09:50

## 2018-09-29 RX ADMIN — GABAPENTIN SCH MG: 100 CAPSULE ORAL at 21:47

## 2018-09-29 RX ADMIN — PREDNISONE SCH MG: 20 TABLET ORAL at 21:47

## 2018-09-29 RX ADMIN — Medication SCH DROP: at 21:47

## 2018-09-29 RX ADMIN — PREDNISONE SCH MG: 20 TABLET ORAL at 09:49

## 2018-09-29 RX ADMIN — ALLOPURINOL SCH MG: 100 TABLET ORAL at 09:49

## 2018-09-29 RX ADMIN — INSULIN ASPART SCH UNITS: 100 INJECTION, SOLUTION INTRAVENOUS; SUBCUTANEOUS at 06:01

## 2018-09-29 RX ADMIN — PANTOPRAZOLE SODIUM SCH MG: 40 TABLET, DELAYED RELEASE ORAL at 09:49

## 2018-09-29 RX ADMIN — BUDESONIDE AND FORMOTEROL FUMARATE DIHYDRATE SCH PUFF: 160; 4.5 AEROSOL RESPIRATORY (INHALATION) at 21:47

## 2018-09-29 RX ADMIN — INSULIN ASPART SCH UNITS: 100 INJECTION, SOLUTION INTRAVENOUS; SUBCUTANEOUS at 12:07

## 2018-09-29 RX ADMIN — GABAPENTIN SCH MG: 100 CAPSULE ORAL at 13:43

## 2018-09-29 RX ADMIN — AMLODIPINE BESYLATE SCH MG: 5 TABLET ORAL at 09:49

## 2018-09-29 RX ADMIN — INSULIN DETEMIR SCH UNITS: 100 INJECTION, SOLUTION SUBCUTANEOUS at 21:48

## 2018-09-29 RX ADMIN — ATORVASTATIN CALCIUM SCH MG: 20 TABLET, FILM COATED ORAL at 21:47

## 2018-09-29 NOTE — PN
Progress Note (short form)





- Note


Progress Note: 





Renal Follow up for PATRIZIA on CKD 





Pt seen and examined at the bedside


no acute complaints





 Vital Signs











Temperature  97.6 F   09/29/18 05:00


 


Pulse Rate  78   09/29/18 05:00


 


Respiratory Rate  20   09/29/18 05:00


 


Blood Pressure  144/62   09/29/18 05:00


 


O2 Sat by Pulse Oximetry (%)  96   09/28/18 09:00











NAD


CTA


+ edema 1/4 up the leg 





 CBC, BMP





 09/28/18 06:20 





 09/29/18 06:30 





 Current Medications





Allopurinol (Zyloprim -)  100 mg PO DAILY [facility]


   Last Admin: 09/29/18 09:49 Dose:  100 mg


Amlodipine Besylate (Norvasc -)  5 mg PO DAILY [facility]


   Last Admin: 09/29/18 09:49 Dose:  5 mg


Atorvastatin Calcium (Lipitor -)  20 mg PO HS [facility]


Budesonide/Formoterol Fumarate (Symbicort 160/4.5mcg -)  2 puff IH BID [facility]


   Last Admin: 09/29/18 09:50 Dose:  2 puff


Gabapentin (Neurontin -)  200 mg PO TID [facility]


   Last Admin: 09/29/18 06:12 Dose:  200 mg


Ceftriaxone Sodium 1 gm/ (Dextrose)  50 mls @ 100 mls/hr IVPB DAILY [facility]


   Last Admin: 09/29/18 09:48 Dose:  100 mls/hr


Sodium Chloride (Normal Saline -)  1,000 mls @ 75 mls/hr IV ASDIR [facility]


   Stop: 09/29/18 22:44


Insulin Aspart (Novolog Vial Sliding Scale -)  1 vial SQ ACHS [facility]; Protocol


   Last Admin: 09/29/18 06:01 Dose:  4 units


Non-Formulary Medication (Travoprost [Travatan Z])  1 drop OU HS [facility]


Pantoprazole Sodium (Protonix -)  40 mg PO DAILY [facility]


   Last Admin: 09/29/18 09:49 Dose:  40 mg


Polyethylene Glycol (Miralax (For Daily Use) -)  17 gm PO DAILY [facility]


   Last Admin: 09/29/18 09:51 Dose:  17 gm


Prednisone (Deltasone -)  20 mg PO BID [facility]


   Last Admin: 09/29/18 09:49 Dose:  20 mg











88 year old AA woman with hx of non-proteinuric CKD stage 3B (baseline Cr 1.6-

1.7), Hypertension, HLD, Diastolic HF, COPD, Anemia, Chronic LE edema gout, 

diverticulosis presents from home with complaints of vision changes. 





 #Vision change with clinical hx of temporal arteritis 


#PATRIZIA on CKD  


#Anemia


#Hypokalemia 


#Hypertension 


#Thrombocytopenia





Renal function unchanged


holding diuretics


trial of IVF x 12 hours


Trend renal function and electrolytes 


check LDH, Haptoglobin r/o TTP given low plts 


not on heparin 





Rodrigo Wyatt DO

## 2018-09-29 NOTE — PN
Progress Note (short form)





- Note


Progress Note: 





pt seen/ examined.


sitting in chair


all consults noted/ appreciated


denies cp/sob/abd pain/ headche/ dizziness or any vision changes


tapering steroids


 Vital Signs











Temp  97.6 F   09/29/18 09:00


 


Pulse  92 H  09/29/18 09:00


 


Resp  20   09/29/18 09:00


 


BP  121/71   09/29/18 09:00


 


Pulse Ox  96   09/28/18 09:00








 Intake & Output











 09/28/18 09/28/18 09/29/18





 11:59 23:59 11:59


 


Intake Total 50  285


 


Output Total   2


 


Balance 50  283


 


Intake:   


 


  Oral 50  285


 


Output:   


 


  Urine   2


 


    Void   2


 


Other:   


 


  Voiding Method Toilet Toilet Bedpan


 


  # Unmeasured Voids   


 


    Void 1  


 


  Bowel Movement  Yes 


 


  # Bowel Movements  1 








Active Medications





Allopurinol (Zyloprim -)  100 mg PO DAILY [facility]


   Last Admin: 09/29/18 09:49 Dose:  100 mg


Amlodipine Besylate (Norvasc -)  5 mg PO DAILY [facility]


   Last Admin: 09/29/18 09:49 Dose:  5 mg


Atorvastatin Calcium (Lipitor -)  20 mg PO HS [facility]


Budesonide/Formoterol Fumarate (Symbicort 160/4.5mcg -)  2 puff IH BID [facility]


   Last Admin: 09/29/18 09:50 Dose:  2 puff


Gabapentin (Neurontin -)  200 mg PO TID [facility]


   Last Admin: 09/29/18 06:12 Dose:  200 mg


Ceftriaxone Sodium 1 gm/ (Dextrose)  50 mls @ 100 mls/hr IVPB DAILY [facility]


   Last Admin: 09/29/18 09:48 Dose:  100 mls/hr


Sodium Chloride (Normal Saline -)  1,000 mls @ 75 mls/hr IV ASDIR [facility]


   Stop: 09/29/18 22:44


Insulin Aspart (Novolog Vial Sliding Scale -)  [facility] vial SQ [facility]; Protocol


   Last Admin: 09/29/18 06:01 Dose:  4 units


Insulin Detemir (Levemir Vial)  10 units SQ HS [facility]


Non-Formulary Medication (Travoprost [Travatan Z])  1 drop OU HS [facility]


Pantoprazole Sodium (Protonix -)  40 mg PO DAILY [facility]


   Last Admin: 09/29/18 09:49 Dose:  40 mg


Polyethylene Glycol (Miralax (For Daily Use) -)  17 gm PO DAILY [facility]


   Last Admin: 09/29/18 09:51 Dose:  17 gm


Prednisone (Deltasone -)  20 mg PO BID [facility]


   Last Admin: 09/29/18 09:49 Dose:  20 mg





 CBC, BMP





 09/28/18 06:20 





 09/29/18 06:30 





 Microbiology











 09/26/18 18:43 Urine Culture - Final





 Urine - Urine Clean Catch    Contaminated: Please Repeat








Physical Exam


Constitutional: Yes: No Distress, Calm


Cardiovascular: Yes: Regular Rate and Rhythm, Murmur


Respiratory: Yes: Diminished


Gastrointestinal: Yes: Normal Bowel Sounds, Soft, Abdomen, Obese.  No: 

Tenderness


Edema: Yes


Edema: +


Neurological: Yes: Alert, awake 











Problem List





- Problems


(1) Thrombocytopenia


Code(s): D69.6 - THROMBOCYTOPENIA, UNSPECIFIED   





(2) Altered mental status


Code(s): R41.82 - ALTERED MENTAL STATUS, UNSPECIFIED   


Qualifiers: 


   Altered mental status type: unspecified   Qualified Code(s): R41.82 - 

Altered mental status, unspecified   





(3) Lactic acidosis


Code(s): E87.2 - ACIDOSIS   





(4) NSTEMI (non-ST elevated myocardial infarction)


Code(s): I21.4 - NON-ST ELEVATION (NSTEMI) MYOCARDIAL INFARCTION   





(5) PATRIZIA (acute kidney injury)


Code(s): N17.9 - ACUTE KIDNEY FAILURE, UNSPECIFIED   





Assessment/Plan





Clinically stable


continue present care


PO prednisone BID-- tapering


physical therapy


d/c abx


renal starting on fluids


monitor lytes


scd stockings


off heparin


add basal insulin


check hba1c


tsh low-- but pt on steroids


will follow














Problem List





- Problems


(1) Diabetes


Code(s): E11.9 - TYPE 2 DIABETES MELLITUS WITHOUT COMPLICATIONS

## 2018-09-30 LAB
ALBUMIN SERPL-MCNC: 2.6 G/DL (ref 3.4–5)
ALP SERPL-CCNC: 114 U/L (ref 45–117)
ALT SERPL-CCNC: 50 U/L (ref 13–61)
ANION GAP SERPL CALC-SCNC: 15 MMOL/L (ref 8–16)
APTT BLD: 18.1 SECONDS (ref 25.2–36.5)
AST SERPL-CCNC: 22 U/L (ref 15–37)
BASOPHILS # BLD: 0.3 % (ref 0–2)
BILIRUB SERPL-MCNC: 0.4 MG/DL (ref 0.2–1)
BUN SERPL-MCNC: 47 MG/DL (ref 7–18)
CALCIUM SERPL-MCNC: 8.8 MG/DL (ref 8.5–10.1)
CHLORIDE SERPL-SCNC: 104 MMOL/L (ref 98–107)
CO2 SERPL-SCNC: 21 MMOL/L (ref 21–32)
CREAT SERPL-MCNC: 1.9 MG/DL (ref 0.55–1.3)
DEPRECATED RDW RBC AUTO: 17.9 % (ref 11.6–15.6)
EOSINOPHIL # BLD: 0 % (ref 0–4.5)
GLUCOSE SERPL-MCNC: 188 MG/DL (ref 74–106)
HCT VFR BLD CALC: 29.7 % (ref 32.4–45.2)
HGB BLD-MCNC: 9.7 GM/DL (ref 10.7–15.3)
INR BLD: 0.88 (ref 0.83–1.09)
LYMPHOCYTES # BLD: 6.1 % (ref 8–40)
MAGNESIUM SERPL-MCNC: 2.3 MG/DL (ref 1.8–2.4)
MCH RBC QN AUTO: 28.7 PG (ref 25.7–33.7)
MCHC RBC AUTO-ENTMCNC: 32.5 G/DL (ref 32–36)
MCV RBC: 88.3 FL (ref 80–96)
MONOCYTES # BLD AUTO: 5.2 % (ref 3.8–10.2)
NEUTROPHILS # BLD: 88.4 % (ref 42.8–82.8)
PHOSPHATE SERPL-MCNC: 2.9 MG/DL (ref 2.5–4.9)
PLATELET # BLD AUTO: 63 K/MM3 (ref 134–434)
PMV BLD: 8.8 FL (ref 7.5–11.1)
POTASSIUM SERPLBLD-SCNC: 4.1 MMOL/L (ref 3.5–5.1)
PROT SERPL-MCNC: 5.1 G/DL (ref 6.4–8.2)
PT PNL PPP: 10.4 SEC (ref 9.7–13)
RBC # BLD AUTO: 3.37 M/MM3 (ref 3.6–5.2)
SODIUM SERPL-SCNC: 140 MMOL/L (ref 136–145)
WBC # BLD AUTO: 5.6 K/MM3 (ref 4–10)

## 2018-09-30 RX ADMIN — PREDNISONE SCH MG: 20 TABLET ORAL at 21:29

## 2018-09-30 RX ADMIN — INSULIN ASPART SCH UNITS: 100 INJECTION, SOLUTION INTRAVENOUS; SUBCUTANEOUS at 17:24

## 2018-09-30 RX ADMIN — GABAPENTIN SCH MG: 100 CAPSULE ORAL at 13:25

## 2018-09-30 RX ADMIN — INSULIN DETEMIR SCH UNITS: 100 INJECTION, SOLUTION SUBCUTANEOUS at 21:28

## 2018-09-30 RX ADMIN — Medication SCH DROP: at 21:30

## 2018-09-30 RX ADMIN — BUDESONIDE AND FORMOTEROL FUMARATE DIHYDRATE SCH PUFF: 160; 4.5 AEROSOL RESPIRATORY (INHALATION) at 09:34

## 2018-09-30 RX ADMIN — AMLODIPINE BESYLATE SCH MG: 5 TABLET ORAL at 09:33

## 2018-09-30 RX ADMIN — GABAPENTIN SCH MG: 100 CAPSULE ORAL at 07:14

## 2018-09-30 RX ADMIN — INSULIN ASPART SCH UNITS: 100 INJECTION, SOLUTION INTRAVENOUS; SUBCUTANEOUS at 07:14

## 2018-09-30 RX ADMIN — CEFTRIAXONE SCH MLS/HR: 1 INJECTION, POWDER, FOR SOLUTION INTRAMUSCULAR; INTRAVENOUS at 09:33

## 2018-09-30 RX ADMIN — INSULIN ASPART SCH UNITS: 100 INJECTION, SOLUTION INTRAVENOUS; SUBCUTANEOUS at 12:00

## 2018-09-30 RX ADMIN — PREDNISONE SCH MG: 20 TABLET ORAL at 09:33

## 2018-09-30 RX ADMIN — BUDESONIDE AND FORMOTEROL FUMARATE DIHYDRATE SCH PUFF: 160; 4.5 AEROSOL RESPIRATORY (INHALATION) at 21:29

## 2018-09-30 RX ADMIN — POLYETHYLENE GLYCOL 3350 SCH GM: 17 POWDER, FOR SOLUTION ORAL at 09:34

## 2018-09-30 RX ADMIN — INSULIN ASPART SCH UNITS: 100 INJECTION, SOLUTION INTRAVENOUS; SUBCUTANEOUS at 21:29

## 2018-09-30 RX ADMIN — ALLOPURINOL SCH MG: 100 TABLET ORAL at 09:33

## 2018-09-30 RX ADMIN — ATORVASTATIN CALCIUM SCH MG: 20 TABLET, FILM COATED ORAL at 21:29

## 2018-09-30 RX ADMIN — GABAPENTIN SCH MG: 100 CAPSULE ORAL at 21:29

## 2018-09-30 RX ADMIN — PANTOPRAZOLE SODIUM SCH MG: 40 TABLET, DELAYED RELEASE ORAL at 09:33

## 2018-09-30 NOTE — PN
Progress Note (short form)





- Note


Progress Note: 





Renal Follow up for PATRIZIA on CKD 





Pt seen and examined at the bedside


no acute complaints


no sob, cp, abd pain 





 Vital Signs











Temperature  98 F   09/30/18 05:56


 


Pulse Rate  84   09/30/18 09:00


 


Respiratory Rate  18   09/30/18 09:00


 


Blood Pressure  160/76   09/30/18 09:00


 


O2 Sat by Pulse Oximetry (%)  98   09/29/18 21:00














NAD


CTA


+ edema 1/4 up the leg 





 CBC, BMP





 09/30/18 06:25 





 09/30/18 06:25 





 Current Medications





Allopurinol (Zyloprim -)  100 mg PO DAILY [facility]


   Last Admin: 09/30/18 09:33 Dose:  100 mg


Amlodipine Besylate (Norvasc -)  5 mg PO DAILY [facility]


   Last Admin: 09/30/18 09:33 Dose:  5 mg


Atorvastatin Calcium (Lipitor -)  20 mg PO HS [facility]


   Last Admin: 09/29/18 21:47 Dose:  20 mg


Budesonide/Formoterol Fumarate (Symbicort 160/4.5mcg -)  2 puff IH BID [facility]


   Last Admin: 09/30/18 09:34 Dose:  2 puff


Gabapentin (Neurontin -)  200 mg PO TID [facility]


   Last Admin: 09/30/18 07:14 Dose:  200 mg


Ceftriaxone Sodium 1 gm/ (Dextrose)  50 mls @ 100 mls/hr IVPB DAILY [facility]


   Last Admin: 09/30/18 09:33 Dose:  100 mls/hr


Insulin Aspart (Novolog Vial Sliding Scale -)  [facility]; Protocol


   Last Admin: 09/30/18 07:14 Dose:  2 units


Insulin Detemir (Levemir Vial)  10 units SQ [facility]


   Last Admin: 09/29/18 21:48 Dose:  10 units


Non-Formulary Medication (Travoprost [Travatan Z])  1 drop OU HS [facility]


   Last Admin: 09/29/18 21:47 Dose:  1 drop


Pantoprazole Sodium (Protonix -)  40 mg PO DAILY [facility]


   Last Admin: 09/30/18 09:33 Dose:  40 mg


Polyethylene Glycol (Miralax (For Daily Use) -)  17 gm PO DAILY [facility]


   Last Admin: 09/30/18 09:34 Dose:  17 gm


Prednisone (Deltasone -)  20 mg PO BID [facility]


   Last Admin: 09/30/18 09:33 Dose:  20 mg














88 year old AA woman with hx of non-proteinuric CKD stage 3B (baseline Cr 1.6-

1.7), Hypertension, HLD, Diastolic HF, COPD, Anemia, Chronic LE edema gout, 

diverticulosis presents from home with complaints of vision changes. 





 #Vision change with clinical hx of temporal arteritis 


#PATRIZIA on CKD  


#Anemia


#Hypokalemia 


#Hypertension 


#Thrombocytopenia





Renal function improve ds/p IVF


LDH high but Hatoglobin also high making it less likely that pt has MAHA/TTP


hold off on further IVF for now and trend renal function 


May need to resume oral diuretics at a lower dose tomorrow 


Heme following 





Rodrigo Wyatt DO

## 2018-09-30 NOTE — PN
Progress Note (short form)





- Note


Progress Note: 


s: no cp sob palps dizzy





o:


  Vital Signs











 Period  Temp  Pulse  Resp  BP Sys/Boothe  Pulse Ox


 


 Last 24 Hr  97.6 F-98.3 F  76-89  18-22  113-160/51-76  98











nad, calm


jvd flat, neck supple


ctab, nl effort


rrr nl s1, s2 no mrg


+ bs soft nt nd, obese


trace le edema.  no c/c


aaox3


no jaundice, diaphoresis





  Current Medications











Generic Name Dose Route Start Last Admin





  Trade Name Samir  PRN Reason Stop Dose Admin


 


Allopurinol  100 mg  09/29/18 10:00  09/30/18 09:33





  Zyloprim -  PO   100 mg





  DAILY ELLA   Administration





     





     





     





     


 


Amlodipine Besylate  5 mg  09/29/18 10:00  09/30/18 09:33





  Norvasc -  PO   5 mg





  DAILY ELLA   Administration





     





     





     





     


 


Atorvastatin Calcium  20 mg  09/29/18 22:00  09/29/18 21:47





  Lipitor -  PO   20 mg





  HS ELLA   Administration





     





     





     





     


 


Budesonide/Formoterol Fumarate  2 puff  09/29/18 10:00  09/30/18 09:34





  Symbicort 160/4.5mcg -  IH   2 puff





  BID ELLA   Administration





     





     





     





     


 


Gabapentin  200 mg  09/29/18 06:00  09/30/18 07:14





  Neurontin -  PO   200 mg





  TID ELLA   Administration





     





     





     





     


 


Ceftriaxone Sodium 1 gm/  50 mls @ 100 mls/hr  09/29/18 10:00  09/30/18 09:33





  Dextrose  IVPB   100 mls/hr





  DAILY ELLA   Administration





     





     





     





     


 


Insulin Aspart  1 vial  09/29/18 07:00  09/30/18 07:14





  Novolog Vial Sliding Scale -  SQ   2 units





  [facility] ELLA   Administration





     





     





  Protocol   





     


 


Insulin Detemir  10 units  09/29/18 22:00  09/29/18 21:48





  Levemir Vial  SQ   10 units





  HS ELLA   Administration





     





     





     





     


 


Non-Formulary Medication  1 drop  09/29/18 22:00  09/29/18 21:47





  Travoprost [Travatan Z]  OU   1 drop





  HS ELLA   Administration





     





     





     





     


 


Pantoprazole Sodium  40 mg  09/29/18 10:00  09/30/18 09:33





  Protonix -  PO   40 mg





  DAILY ELLA   Administration





     





     





     





     


 


Polyethylene Glycol  17 gm  09/29/18 10:00  09/30/18 09:34





  Miralax (For Daily Use) -  PO   17 gm





  DAILY ELLA   Administration





     





     





     





     


 


Prednisone  20 mg  09/29/18 10:00  09/30/18 09:33





  Deltasone -  PO   20 mg





  BID ELLA   Administration





     





     





     





     








 CBC, BMP





 09/30/18 06:25 





 09/30/18 06:25 








ekg:  sr, pacs, pvcs, rbbb





cxr: no congestion/infiltrates





tele:  sr, occ pvcs





head ct: wnl





echo 12/2017 :nl lv/rv size/fn.  impaired relaxation.  1+ lae. nl valves.  nl 

ivc.  no rvsp. 





echo 9/2018:  nl lv/rv, no sig valve path





holter 12/2016: avg HR 56 bpm.  no bradyarrhythmias.  no sig pauses.  1% pvc 

burden.  one run of psvt (4 beats).  





stress test 2/2018: No Ekg changes.  No ischemia.  nl perf/EF. 


stress test 2014: no ekg changes, sx's. bline hr 52 bpm.  nl perfusion/EF. 








A/P: 87 yo f with h/o HTN, hl, dchf vs venous insufficiency, DM, CKD, COPD, 

anemia, hyperkalemia,  gout, diverticulosis/gastritis/gib, who p/w vision 

changes. 





vision changes:


-plans per PMD





bradycardia


- has longstanding hx of bradycardia, asx.  Patient had multiple Holter 

monitors for evaluation of bradycardia in past --> no sig bradyarrhythmias.  


  


htn


- cont norvasc 





venous insufficiency


- was on torsemide 100 qd at home, held for diana here.  Would resume when 

possible as she has significant le edema.  Can try 50 qd torsemide to start.





hld:


-cont statin





ckd


- cr stable





elevated trops:


-borderline trop elevation with flat trend, nl ck, and similar to prior 

baseline values-->not consistent with acs





cardiac wise stable

## 2018-09-30 NOTE — PN
Progress Note (short form)





- Note


Progress Note: 


sitting in chair


comfortable


denies pain


all f/u noted


denies cp/sob/ abd pain


denies headche/ dizziness.


 Vital Signs











Temp  98.3 F   09/30/18 13:56


 


Pulse  88   09/30/18 13:56


 


Resp  18   09/30/18 13:56


 


BP  122/66   09/30/18 13:56


 


Pulse Ox  97   09/30/18 09:00








 Intake & Output











 09/29/18 09/30/18 09/30/18





 23:59 11:59 23:59


 


Intake Total 1620 550 


 


Output Total 1  


 


Balance 1619 550 


 


Intake:   


 


   300 


 


    Normal Saline - 1,000 ml 525 300 





    @ 75 mls/hr IV ASDIR [facility]   





    Rx#:[id number]   


 


  IVPB 50 50 


 


  Oral 1045 200 


 


Output:   


 


  Urine 1  


 


    Void 1  


 


Other:   


 


  Voiding Method Toilet Toilet 


 


  # Unmeasured Voids   


 


    Void  2 


 


  Bowel Movement No Yes 


 


  # Bowel Movements  1 








Active Medications





Allopurinol (Zyloprim -)  100 mg PO DAILY [facility]


   Last Admin: 09/30/18 09:33 Dose:  100 mg


Amlodipine Besylate (Norvasc -)  5 mg PO DAILY [facility]


   Last Admin: 09/30/18 09:33 Dose:  5 mg


Atorvastatin Calcium (Lipitor -)  20 mg PO [facility]


   Last Admin: 09/29/18 21:47 Dose:  20 mg


Budesonide/Formoterol Fumarate (Symbicort 160/4.5mcg -)  2 puff IH BID [facility]


   Last Admin: 09/30/18 09:34 Dose:  2 puff


Gabapentin (Neurontin -)  200 mg PO TID [facility]


   Last Admin: 09/30/18 13:25 Dose:  200 mg


Ceftriaxone Sodium 1 gm/ (Dextrose)  50 mls @ 100 mls/hr IVPB DAILY [facility]


   Last Admin: 09/30/18 09:33 Dose:  100 mls/hr


Insulin Aspart (Novolog Vial Sliding Scale -)  1 vial SQ [facility]; Protocol


   Last Admin: 09/30/18 12:00 Dose:  3 units


Insulin Detemir (Levemir Vial)  10 units SQ [facility]


   Last Admin: 09/29/18 21:48 Dose:  10 units


Non-Formulary Medication (Travoprost [Travatan Z])  1 drop OU [facility]


   Last Admin: 09/29/18 21:47 Dose:  1 drop


Pantoprazole Sodium (Protonix -)  40 mg PO DAILY [facility]


   Last Admin: 09/30/18 09:33 Dose:  40 mg


Polyethylene Glycol (Miralax (For Daily Use) -)  17 gm PO DAILY [facility]


   Last Admin: 09/30/18 09:34 Dose:  17 gm


Prednisone (Deltasone -)  20 mg PO BID [facility]


   Last Admin: 09/30/18 09:33 Dose:  20 mg





 CBC, BMP





 09/30/18 06:25 





 09/30/18 06:25 











Physical Exam


Constitutional: Yes: No Distress, comfortable


Cardiovascular: Yes: Regular Rate and Rhythm, Murmur


Respiratory: Yes: Diminished


Gastrointestinal: Yes: Normal Bowel Sounds, Soft, Abdomen, Obese.  No: 

Tenderness


Edema: Yes


Edema: +


Neurological: Yes: Alert, awake 











Problem List





- Problems


(1) Thrombocytopenia


Code(s): D69.6 - THROMBOCYTOPENIA, UNSPECIFIED   





(2) Altered mental status


Code(s): R41.82 - ALTERED MENTAL STATUS, UNSPECIFIED   


Qualifiers: 


   Altered mental status type: unspecified   Qualified Code(s): R41.82 - 

Altered mental status, unspecified   





(3) Lactic acidosis


Code(s): E87.2 - ACIDOSIS   





(4) NSTEMI (non-ST elevated myocardial infarction)


Code(s): I21.4 - NON-ST ELEVATION (NSTEMI) MYOCARDIAL INFARCTION   





(5) PATRIZIA (acute kidney injury)


Code(s): N17.9 - ACUTE KIDNEY FAILURE, UNSPECIFIED   





Assessment/Plan





Clinically stable


continue present care


PO prednisone BID-- tapering


physical therapy


off abx


renal starting on fluids-- stopped 


monitor lytes


scd stockings


off heparin


added basal insulin-- monitor


check hba1c-- 7.7


will follow


overall stable


d/c planning-- likely tomorrow























Problem List





- Problems


(1) Diabetes


Code(s): E11.9 - TYPE 2 DIABETES MELLITUS WITHOUT COMPLICATIONS

## 2018-10-01 VITALS — SYSTOLIC BLOOD PRESSURE: 138 MMHG | HEART RATE: 86 BPM | DIASTOLIC BLOOD PRESSURE: 95 MMHG | TEMPERATURE: 98.5 F

## 2018-10-01 LAB
ALBUMIN SERPL-MCNC: 2.5 G/DL (ref 3.4–5)
ALP SERPL-CCNC: 119 U/L (ref 45–117)
ALT SERPL-CCNC: 75 U/L (ref 13–61)
ANION GAP SERPL CALC-SCNC: 10 MMOL/L (ref 8–16)
AST SERPL-CCNC: 45 U/L (ref 15–37)
BASOPHILS # BLD: 0.2 % (ref 0–2)
BILIRUB SERPL-MCNC: 0.3 MG/DL (ref 0.2–1)
BUN SERPL-MCNC: 45 MG/DL (ref 7–18)
CALCIUM SERPL-MCNC: 9 MG/DL (ref 8.5–10.1)
CHLORIDE SERPL-SCNC: 105 MMOL/L (ref 98–107)
CO2 SERPL-SCNC: 23 MMOL/L (ref 21–32)
CREAT SERPL-MCNC: 1.9 MG/DL (ref 0.55–1.3)
DEPRECATED RDW RBC AUTO: 18.3 % (ref 11.6–15.6)
EOSINOPHIL # BLD: 0 % (ref 0–4.5)
GLUCOSE SERPL-MCNC: 204 MG/DL (ref 74–106)
HCT VFR BLD CALC: 29.9 % (ref 32.4–45.2)
HGB BLD-MCNC: 9.5 GM/DL (ref 10.7–15.3)
LYMPHOCYTES # BLD: 6.3 % (ref 8–40)
MAGNESIUM SERPL-MCNC: 2.4 MG/DL (ref 1.8–2.4)
MCH RBC QN AUTO: 28.1 PG (ref 25.7–33.7)
MCHC RBC AUTO-ENTMCNC: 31.9 G/DL (ref 32–36)
MCV RBC: 88 FL (ref 80–96)
MONOCYTES # BLD AUTO: 3.6 % (ref 3.8–10.2)
NEUTROPHILS # BLD: 89.9 % (ref 42.8–82.8)
PLATELET # BLD AUTO: 60 K/MM3 (ref 134–434)
PMV BLD: 9.2 FL (ref 7.5–11.1)
POTASSIUM SERPLBLD-SCNC: 4.2 MMOL/L (ref 3.5–5.1)
PROT SERPL-MCNC: 5 G/DL (ref 6.4–8.2)
RBC # BLD AUTO: 3.39 M/MM3 (ref 3.6–5.2)
SODIUM SERPL-SCNC: 138 MMOL/L (ref 136–145)
WBC # BLD AUTO: 5.6 K/MM3 (ref 4–10)

## 2018-10-01 RX ADMIN — INSULIN ASPART SCH UNITS: 100 INJECTION, SOLUTION INTRAVENOUS; SUBCUTANEOUS at 11:42

## 2018-10-01 RX ADMIN — PANTOPRAZOLE SODIUM SCH MG: 40 TABLET, DELAYED RELEASE ORAL at 10:54

## 2018-10-01 RX ADMIN — AMLODIPINE BESYLATE SCH MG: 5 TABLET ORAL at 10:55

## 2018-10-01 RX ADMIN — GABAPENTIN SCH MG: 100 CAPSULE ORAL at 06:33

## 2018-10-01 RX ADMIN — POLYETHYLENE GLYCOL 3350 SCH: 17 POWDER, FOR SOLUTION ORAL at 10:55

## 2018-10-01 RX ADMIN — BUDESONIDE AND FORMOTEROL FUMARATE DIHYDRATE SCH PUFF: 160; 4.5 AEROSOL RESPIRATORY (INHALATION) at 10:59

## 2018-10-01 RX ADMIN — PREDNISONE SCH MG: 20 TABLET ORAL at 10:55

## 2018-10-01 RX ADMIN — INSULIN ASPART SCH UNITS: 100 INJECTION, SOLUTION INTRAVENOUS; SUBCUTANEOUS at 06:33

## 2018-10-01 RX ADMIN — GABAPENTIN SCH MG: 100 CAPSULE ORAL at 14:38

## 2018-10-01 RX ADMIN — CEFTRIAXONE SCH MLS/HR: 1 INJECTION, POWDER, FOR SOLUTION INTRAMUSCULAR; INTRAVENOUS at 10:59

## 2018-10-01 RX ADMIN — ALLOPURINOL SCH MG: 100 TABLET ORAL at 10:56

## 2018-10-01 NOTE — DS
Physical Examination


Vital Signs: 


 Vital Signs











Temperature  97.7 F   10/01/18 02:00


 


Pulse Rate  83   10/01/18 02:00


 


Respiratory Rate  20   10/01/18 02:00


 


Blood Pressure  144/75   10/01/18 02:00


 


O2 Sat by Pulse Oximetry (%)  100   09/30/18 21:00











Findings/Remarks: 





dictated


Labs: 


 CBC, BMP





 10/01/18 07:30 





 10/01/18 07:30 











Discharge Summary


Reason For Visit: LACTIC ACIDOSIS,AMS,NSTEMI


Current Active Problems





Altered mental status (Acute)


Diabetes (Acute)


Lactic acidosis (Acute)


NSTEMI (non-ST elevated myocardial infarction) (Acute)


Thrombocytopenia (Acute)








Condition: Good





- Instructions


Referrals: 


Jace Mckee MD [Primary Care Provider] - 





- Home Medications


Comprehensive Discharge Medication List: 


Ambulatory Orders





Allopurinol [Zyloprim -] 100 mg PO DAILY 07/19/14 


Budesonide/Formeterol Fumarate [SYMBICORT 160/4.5mcg -] 2 inh PO BID 07/19/14 


Gabapentin 200 mg PO TID 07/19/14 


Glipizide 5 mg PO DAILY 09/09/17 


Pantoprazole Sodium [Protonix] 40 mg PO DAILY #30 tablet. 02/19/18 


Amlodipine Besylate [Norvasc -] 5 mg PO DAILY #30 tablet 05/10/18 


Atorvastatin Calcium [Lipitor] 20 mg PO DAILY 08/20/18 


Polyethylene Glycol 3350 [Miralax 119 gm Btl -] 17 gm PO DAILY 08/20/18 


Travoprost [Travatan Z] 1 drop OU HS 09/27/18 


Torsemide [Demadex -] 50 mg PO DAILY  tablet 10/01/18 


predniSONE [Deltasone -] 20 mg PO BID  tablet 10/01/18

## 2018-10-01 NOTE — PN
Progress Note (short form)





- Note


Progress Note: 





Renal Follow up for PATRIZIA on CKD 





Pt seen and examined at the bedside


no acute complaints


no sob, cp, abd pain 





 Vital Signs











Temperature  98.5 F   10/01/18 14:46


 


Pulse Rate  86   10/01/18 14:46


 


Respiratory Rate  18   10/01/18 14:46


 


Blood Pressure  138/95   10/01/18 14:46


 


O2 Sat by Pulse Oximetry (%)  94 L  10/01/18 09:00

















NAD


CTA


+ edema 1/4 up the leg 





 CBC, BMP





 10/01/18 07:30 





 10/01/18 07:30 











88 year old AA woman with hx of non-proteinuric CKD stage 3B (baseline Cr 1.6-

1.7), Hypertension, HLD, Diastolic HF, COPD, Anemia, Chronic LE edema gout, 

diverticulosis presents from home with complaints of vision changes. 





 #Vision change with clinical hx of temporal arteritis 


#PATRIZIA on CKD  


#Anemia


#Hypokalemia 


#Hypertension 


#Thrombocytopenia





Renal function now improved and stable


restarted on torsemide 50g Daily


if discharged should have repeat BMP in 2-3 days


Trend edema


oral intake of water and solute encouraged 


follow up in our office in 2-4 weeks for CKD monitoring/management 





Rodrigo Wyatt DO

## 2018-10-01 NOTE — PN
Physical Exam: 


SUBJECTIVE: Patient seen and examined at bedside. No overnight events. No new 

complaints. She feels well today. Ready to go home. Denies CP,HA, SOB, 

abdominal pain, nausea or vomiting. 








OBJECTIVE:





 Vital Signs











 Period  Temp  Pulse  Resp  BP Sys/Boothe  Pulse Ox


 


 Last 24 Hr  97.5 F-98.4 F  83-98  20-20  136-147/62-78  











GENERAL: Awake and alert, NAD


HEAD: NCAT


EARS, NOSE, THROAT: Moist mucous membranes.


NECK:Supple without lymphadenopathy, JVD, or masses.


LUNGS: CTAB. No wheezes, and no crackles. No accessory muscle use.


Breast: without masses, dimpling, or retraction. surgical scar on lower 

inferior aspect of left breast. 


HEART: RRR, normal S1 and S2 without murmur, rub or gallop.


ABDOMEN: Soft, nontender, not distended, normoactive bowel sounds, no guarding, 

no rebound, no masses. multiple surgical scars and ventral hernia.  


MUSCULOSKELETAL: Decreased ROM in shoulders R>L. 


UPPER EXTREMITIES: 2+ pulses, warm, well-perfused. No cyanosis. No clubbing. . 

No peripheral edema.


LOWER EXTREMITIES: 2+ pulses, warm, well-perfused. 3+ peripheral edema. 


PSYCHIATRIC: Cooperative. Good eye contact. Appropriate mood and affect.


SKIN: echymotic right buttuck area.














 Laboratory Results - last 24 hr











  09/30/18 09/30/18 10/01/18





  16:33 21:01 05:58


 


WBC   


 


RBC   


 


Hgb   


 


Hct   


 


MCV   


 


MCH   


 


MCHC   


 


RDW   


 


Plt Count   


 


MPV   


 


Absolute Neuts (auto)   


 


Neutrophils %   


 


Lymphocytes %   


 


Monocytes %   


 


Eosinophils %   


 


Basophils %   


 


Nucleated RBC %   


 


Sodium   


 


Potassium   


 


Chloride   


 


Carbon Dioxide   


 


Anion Gap   


 


BUN   


 


Creatinine   


 


Creat Clearance w eGFR   


 


POC Glucometer  322  284  220


 


Random Glucose   


 


Calcium   


 


Magnesium   


 


Total Bilirubin   


 


AST   


 


ALT   


 


Alkaline Phosphatase   


 


Total Protein   


 


Albumin   














  10/01/18 10/01/18 10/01/18





  07:30 07:30 11:37


 


WBC  5.6  


 


RBC  3.39 L  


 


Hgb  9.5 L  


 


Hct  29.9 L  


 


MCV  88.0  


 


MCH  28.1  


 


MCHC  31.9 L  


 


RDW  18.3 H  


 


Plt Count  60 L  


 


MPV  9.2  


 


Absolute Neuts (auto)  5.0  


 


Neutrophils %  89.9 H  


 


Lymphocytes %  6.3 L  


 


Monocytes %  3.6 L  


 


Eosinophils %  0.0  


 


Basophils %  0.2  


 


Nucleated RBC %  0  


 


Sodium   138 


 


Potassium   4.2 


 


Chloride   105 


 


Carbon Dioxide   23 


 


Anion Gap   10 


 


BUN   45 H 


 


Creatinine   1.9 H 


 


Creat Clearance w eGFR   24.95 


 


POC Glucometer    214


 


Random Glucose   204 H 


 


Calcium   9.0 


 


Magnesium   2.4 


 


Total Bilirubin   0.3 


 


AST   45 H 


 


ALT   75 H 


 


Alkaline Phosphatase   119 H 


 


Total Protein   5.0 L 


 


Albumin   2.5 L 








Active Medications











Generic Name Dose Route Start Last Admin





  Trade Name Freq  PRN Reason Stop Dose Admin


 


Allopurinol  100 mg  09/29/18 10:00  10/01/18 10:56





  Zyloprim -  PO   100 mg





  DAILY ELLA   Administration





     





     





     





     


 


Amlodipine Besylate  5 mg  09/29/18 10:00  10/01/18 10:55





  Norvasc -  PO   5 mg





  DAILY ELLA   Administration





     





     





     





     


 


Atorvastatin Calcium  20 mg  09/29/18 22:00  09/30/18 21:29





  Lipitor -  PO   20 mg





  HS ELLA   Administration





     





     





     





     


 


Budesonide/Formoterol Fumarate  2 puff  09/29/18 10:00  10/01/18 10:59





  Symbicort 160/4.5mcg -  IH   2 puff





  BID ELLA   Administration





     





     





     





     


 


Gabapentin  200 mg  09/29/18 06:00  10/01/18 14:38





  Neurontin -  PO   200 mg





  TID ELLA   Administration





     





     





     





     


 


Ceftriaxone Sodium 1 gm/  50 mls @ 100 mls/hr  09/29/18 10:00  10/01/18 10:59





  Dextrose  IVPB   100 mls/hr





  DAILY ELLA   Administration





     





     





     





     


 


Insulin Aspart  1 vial  09/29/18 07:00  10/01/18 11:42





  Novolog Vial Sliding Scale -  SQ   3 units





  [facility] ELLA   Administration





     





     





  Protocol   





     


 


Insulin Detemir  10 units  09/29/18 22:00  09/30/18 21:28





  Levemir Vial  SQ   10 units





  HS ELLA   Administration





     





     





     





     


 


Non-Formulary Medication  1 drop  09/29/18 22:00  09/30/18 21:30





  Travoprost [Travatan Z]  OU   1 drop





  HS ELLA   Administration





     





     





     





     


 


Pantoprazole Sodium  40 mg  09/29/18 10:00  10/01/18 10:54





  Protonix -  PO   40 mg





  DAILY ELLA   Administration





     





     





     





     


 


Polyethylene Glycol  17 gm  09/29/18 10:00  10/01/18 10:55





  Miralax (For Daily Use) -  PO   Not Given





  DAILY ELLA   





     





     





     





     


 


Prednisone  20 mg  09/29/18 10:00  10/01/18 10:55





  Deltasone -  PO   20 mg





  BID ELLA   Administration





     





     





     





     


 


Torsemide  50 mg  10/01/18 10:00  10/01/18 10:58





  Demadex -  PO   50 mg





  DAILY ELLA   Administration





     





     





     





     











ASSESSMENT/PLAN:


88 year old female with history of probable temporal arteritis, glaucoma, DM, 

HTN, HLD, CKD admitted with subjective changes in vision.





Problem List





- Problems


(1) Thrombocytopenia


Assessment/Plan: 


Etiology unclear


* normal B12 and folate


* previously normal plt dating back to 8/30/18


* No recent change in medication or illness. 


* current meds reviewed and no obvious medicine are etiologic.


* Ceftriaxone may be contributed


* Normal WBC and relatively normal Hct on admission against underlying primary 

hematologic disorder. 











Visit type





- Emergency Visit


Emergency Visit: Yes


ED Registration Date: 09/27/18


Care time: The patient presented to the Emergency Department on the above date 

and was hospitalized for further evaluation of their emergent condition.





- New Patient


This patient is new to me today: No





- Critical Care


Critical Care patient: No





- Discharge Referral


Referred to [facility] No

## 2018-10-01 NOTE — DS
DATE OF ADMISSION:  09/27/2018

 

DATE OF DISCHARGE:  10/01/2018

 

This patient, an 88-year-old, pleasant female, a patient of Dr. Mckee, with past

medical history of diabetes, hypertension, hyperlipidemia, CKD, and obesity, was

admitted to the hospital when she was sent by PMD with 1-2 week history of visual

issues.  Patient denied any chest pain or shortness of breath.

 

Patient was recently diagnosed with temporal arteritis as per her PMD as her ESR was

very high and treated with prednisone.

 

Patient's PMD also told me that the temporal artery biopsy was negative.

 

Patient admitted to the floor.  CT of head was done which was negative.

 

Patient's troponin also was borderline elevated; for which, cardiology consultation

was taken, and no acute cardiac event was suspected.

 

Patient treated with penicillin, diuretics.

 

Patient also followed by Rheumatology.

 

Patient overall stabilized.

 

patient also has chronic vision issues, and on further questioning, patient denies

any recent vision issues.

 

Patient also seen by Rheumatology during this admission as I mentioned before, as

well as Renal.

 

PHYSICAL EXAMINATION:

General:  Patient today on examination is comfortable, not in distress, alert, and

awake.

Vital Signs:  Stable.

Neck:  Supple.

Lungs:  Diminished at bases.

Heart:  Sounds regular.

Abdomen:  Soft.

Extremities:  Plus edema.

 

ASSESSMENT AND PLAN:  Clinically stable.  We will discharge to home.  Medications

reconciled.  _____ prednisone to be tapered as outpatient basis.

 

Patient also going to follow with Ophthalmology as outpatient basis.  Electrolytes

needs to be monitored closely.

 

I also advised patient to follow up her PMD this week.

 

Patient in agreement.

 

Plan also discussed with nursing staff, and I am going to discuss with patient's

private PMD, also.

 

 

EDINSON DEL CID M.D.

 

[id number]

DD: 10/01/2018 11:08

DT: 10/01/2018 22:35

Job #:  [id number]

## 2018-10-01 NOTE — PN
Progress Note (short form)





- Note


Progress Note: 








88 year old female history of DM,HTN,HLD,CKD. She has been diagnosed with 

temporal arteritis. She is seeing eye doctor outpatient and has history of 

glaucoma.  She lives alone and able to take care of herself.


She was diagnosed with temporal arteritis, and is on high dose of steroid, her 

biopsy was negative. 


She was seen by rheumatologist and did recommend to continue same dose of 

steroid. she do not have any headhace any more. 





Neurological Examiantion


Alert oriented x 3, speech is normal


eomi, pupils is reactive, no temporal tenderness,


she could count fingers at three fit, perception to light is normal


vf is normal to confrontation


strength is normal 5/5 all ext, she has limited shoulder movement 








ct head unremarkable








Assessment: 1. episode confusion, it could be begining of cognitive dysfunction

, she is fully functional at this time and may need outpatient follow up for [facility]

/early Dementia.  b12,folate tsh is normal. I would like to see her outpatient 





2. Temporal arteritis , Her headhace has improved and was seen by 

rheumatologist and advice to continue steroid dose for now. I concur that her 

steroid can be slowly reduced outpatient.





Thanking you so much


Kalpesh Blair md

## 2018-10-01 NOTE — PN
Progress Note (short form)





- Note


Progress Note: 


s: no cp sob palps dizzy





o:


  


 Vital Signs











 Period  Temp  Pulse  Resp  BP Sys/Boothe  Pulse Ox


 


 Last 24 Hr  97.7 F-98.4 F  83-98  18-20  122-147/62-75  100











nad, calm


jvd flat, neck supple


ctab, nl effort


rrr nl s1, s2 no mrg


+ bs soft nt nd, obese


trace le edema.  no c/c


aaox3


no jaundice, diaphoresis


 Current Medications











Generic Name Dose Route Start Last Admin





  Trade Name Freenriqueta  PRN Reason Stop Dose Admin


 


Allopurinol  100 mg  09/29/18 10:00  10/01/18 10:56





  Zyloprim -  PO   100 mg





  DAILY ELLA   Administration





     





     





     





     


 


Amlodipine Besylate  5 mg  09/29/18 10:00  10/01/18 10:55





  Norvasc -  PO   5 mg





  DAILY ELLA   Administration





     





     





     





     


 


Atorvastatin Calcium  20 mg  09/29/18 22:00  09/30/18 21:29





  Lipitor -  PO   20 mg





  HS ELLA   Administration





     





     





     





     


 


Budesonide/Formoterol Fumarate  2 puff  09/29/18 10:00  10/01/18 10:59





  Symbicort 160/4.5mcg -  IH   2 puff





  BID ELLA   Administration





     





     





     





     


 


Gabapentin  200 mg  09/29/18 06:00  10/01/18 06:33





  Neurontin -  PO   200 mg





  TID ELLA   Administration





     





     





     





     


 


Ceftriaxone Sodium 1 gm/  50 mls @ 100 mls/hr  09/29/18 10:00  10/01/18 10:59





  Dextrose  IVPB   100 mls/hr





  DAILY ELLA   Administration





     





     





     





     


 


Insulin Aspart  1 vial  09/29/18 07:00  10/01/18 06:33





  Novolog Vial Sliding Scale -  SQ   3 units





  ACHS ELLA   Administration





     





     





  Protocol   





     


 


Insulin Detemir  10 units  09/29/18 22:00  09/30/18 21:28





  Levemir Vial  SQ   10 units





  HS ELLA   Administration





     





     





     





     


 


Non-Formulary Medication  1 drop  09/29/18 22:00  09/30/18 21:30





  Travoprost [Travatan Z]  OU   1 drop





  HS ELLA   Administration





     





     





     





     


 


Pantoprazole Sodium  40 mg  09/29/18 10:00  10/01/18 10:54





  Protonix -  PO   40 mg





  DAILY ELLA   Administration





     





     





     





     


 


Polyethylene Glycol  17 gm  09/29/18 10:00  10/01/18 10:55





  Miralax (For Daily Use) -  PO   Not Given





  DAILY ELLA   





     





     





     





     


 


Prednisone  20 mg  09/29/18 10:00  10/01/18 10:55





  Deltasone -  PO   20 mg





  BID ELLA   Administration





     





     





     





     


 


Torsemide  50 mg  10/01/18 10:00  10/01/18 10:58





  Demadex -  PO   50 mg





  DAILY ELLA   Administration





     





     





     





     








 CBC, BMP





 10/01/18 07:30 





 10/01/18 07:30 








ekg:  sr, pacs, pvcs, rbbb





cxr: no congestion/infiltrates





tele:  sr, occ pvcs





head ct: wnl





echo 12/2017 :nl lv/rv size/fn.  impaired relaxation.  1+ lae. nl valves.  nl 

ivc.  no rvsp. 





echo 9/2018:  nl lv/rv, no sig valve path





holter 12/2016: avg HR 56 bpm.  no bradyarrhythmias.  no sig pauses.  1% pvc 

burden.  one run of psvt (4 beats).  





stress test 2/2018: No Ekg changes.  No ischemia.  nl perf/EF. 


stress test 2014: no ekg changes, sx's. bline hr 52 bpm.  nl perfusion/EF. 








A/P: 87 yo f with h/o HTN, hl, dchf vs venous insufficiency, DM, CKD, COPD, 

anemia, hyperkalemia,  gout, diverticulosis/gastritis/gib, who p/w vision 

changes. 





vision changes:


-plans per PMD





bradycardia


- has longstanding hx of bradycardia, asx.  Patient had multiple Holter 

monitors for evaluation of bradycardia in past --> no sig bradyarrhythmias.  


  


htn


- cont norvasc 





venous insufficiency


- was on torsemide 100 qd at home, held for diana here.  Cr stable, will start 

lower dose 50 qd torsemide.





hld:


-cont statin





ckd


- cr stable





elevated trops:


-borderline trop elevation with flat trend, nl ck, and similar to prior 

baseline values-->not consistent with acs





cardiac wise stable

## 2018-10-02 ENCOUNTER — HOSPITAL ENCOUNTER (OUTPATIENT)
Dept: HOSPITAL 74 - JER | Age: 83
Setting detail: OBSERVATION
LOS: 1 days | Discharge: NURSING, REHAB OR LONG-TERM CARE | End: 2018-10-03
Attending: INTERNAL MEDICINE | Admitting: INTERNAL MEDICINE
Payer: COMMERCIAL

## 2018-10-02 VITALS — BODY MASS INDEX: 34.4 KG/M2

## 2018-10-02 DIAGNOSIS — R26.2: Primary | ICD-10-CM

## 2018-10-02 DIAGNOSIS — I50.9: ICD-10-CM

## 2018-10-02 DIAGNOSIS — N18.9: ICD-10-CM

## 2018-10-02 DIAGNOSIS — Z88.6: ICD-10-CM

## 2018-10-02 DIAGNOSIS — E78.5: ICD-10-CM

## 2018-10-02 DIAGNOSIS — I12.9: ICD-10-CM

## 2018-10-02 DIAGNOSIS — Z79.84: ICD-10-CM

## 2018-10-02 DIAGNOSIS — D64.9: ICD-10-CM

## 2018-10-02 DIAGNOSIS — E11.22: ICD-10-CM

## 2018-10-02 LAB
ALBUMIN SERPL-MCNC: 2.6 G/DL (ref 3.4–5)
ALP SERPL-CCNC: 130 U/L (ref 45–117)
ALT SERPL-CCNC: 75 U/L (ref 13–61)
ANION GAP SERPL CALC-SCNC: 8 MMOL/L (ref 8–16)
AST SERPL-CCNC: 43 U/L (ref 15–37)
BILIRUB SERPL-MCNC: 0.4 MG/DL (ref 0.2–1)
BUN SERPL-MCNC: 45 MG/DL (ref 7–18)
CALCIUM SERPL-MCNC: 8 MG/DL (ref 8.5–10.1)
CHLORIDE SERPL-SCNC: 107 MMOL/L (ref 98–107)
CO2 SERPL-SCNC: 22 MMOL/L (ref 21–32)
CREAT SERPL-MCNC: 2.2 MG/DL (ref 0.55–1.3)
DEPRECATED RDW RBC AUTO: 18.4 % (ref 11.6–15.6)
GLUCOSE SERPL-MCNC: 199 MG/DL (ref 74–106)
HCT VFR BLD CALC: 31.3 % (ref 32.4–45.2)
HGB BLD-MCNC: 10.3 GM/DL (ref 10.7–15.3)
MCH RBC QN AUTO: 29.4 PG (ref 25.7–33.7)
MCHC RBC AUTO-ENTMCNC: 33 G/DL (ref 32–36)
MCV RBC: 89.2 FL (ref 80–96)
PLATELET # BLD AUTO: 69 K/MM3 (ref 134–434)
PMV BLD: 9.2 FL (ref 7.5–11.1)
POTASSIUM SERPLBLD-SCNC: 4.7 MMOL/L (ref 3.5–5.1)
PROT SERPL-MCNC: 5.4 G/DL (ref 6.4–8.2)
RBC # BLD AUTO: 3.51 M/MM3 (ref 3.6–5.2)
SODIUM SERPL-SCNC: 137 MMOL/L (ref 136–145)
WBC # BLD AUTO: 4.3 K/MM3 (ref 4–10)

## 2018-10-02 PROCEDURE — 3E013VG INTRODUCTION OF INSULIN INTO SUBCUTANEOUS TISSUE, PERCUTANEOUS APPROACH: ICD-10-PCS | Performed by: INTERNAL MEDICINE

## 2018-10-02 PROCEDURE — G0378 HOSPITAL OBSERVATION PER HR: HCPCS

## 2018-10-02 NOTE — PDOC
History of Present Illness





- General


Chief Complaint: Injury


Stated Complaint: FALL


Time Seen by Provider: 10/02/18 11:42


History Source: Patient, Family


Exam Limitations: Dementia





- History of Present Illness


Initial Comments: 





10/02/18 12:16


Patient is an 88F with history of DM, HTN, CKD, HLD here today complaining of 

weakness. She was recently admitted for AMS and found to have an nstemi and PATRIZIA 

on CKD. Patient was discharged to home, where she lives alone, after refusing 

going to a nursing home. Her daughter came to her house to help take care of her

, but she was unable to get to the door to open it. She was found on a chair 

with her walker in front of her. She states that she feels a "heaviness" in her 

right leg, denies trauma, denies pain with movement. Denies chest pain, 

shortness of breath. Patient states that she thinks that she can't take care of 

herself and needs to go to a nursing home. Family is in agreement.





Past History





- Past Medical History


Allergies/Adverse Reactions: 


 Allergies











Allergy/AdvReac Type Severity Reaction Status Date / Time


 


diphenhydramine HCl Allergy Severe anaphyl Verified 09/26/18 13:14





[From Benadryl]     


 


aspirin Allergy Unknown Swelling Verified 09/26/18 13:14


 


natty flavor AdvReac Severe Swelling Verified 09/26/18 13:14











Home Medications: 


Ambulatory Orders





Allopurinol [Zyloprim -] 100 mg PO DAILY 07/19/14 


Budesonide/Formeterol Fumarate [SYMBICORT 160/4.5mcg -] 2 inh PO BID 07/19/14 


Gabapentin 200 mg PO TID 07/19/14 


Glipizide 5 mg PO DAILY 09/09/17 


Pantoprazole Sodium [Protonix] 40 mg PO DAILY #30 tablet. 02/19/18 


Amlodipine Besylate [Norvasc -] 5 mg PO DAILY #30 tablet 05/10/18 


Atorvastatin Calcium [Lipitor] 20 mg PO DAILY 08/20/18 


Polyethylene Glycol 3350 [Miralax 119 gm Btl -] 17 gm PO DAILY 08/20/18 


Travoprost [Travatan Z] 1 drop OU HS 09/27/18 


Torsemide [Demadex -] 50 mg PO DAILY  tablet 10/01/18 


predniSONE [Deltasone -] 20 mg PO BID  tablet 10/01/18 








Anemia: Yes


Asthma: Yes


Cancer: No


Cardiac Disorders: Yes (bradycardia)


CVA: No


COPD: No


CHF: No


DVT: No


Dementia: No


Diabetes: Yes


GI Disorders: Yes (diverticulitis)


 Disorders: No


HTN: Yes


Hypercholesterolemia: Yes


Liver Disease: No


Seizures: No


Thyroid Disease: No





- Surgical History


Abdominal Surgery: Yes (hernia repairs, small bowel resection,debride abd wall 

abscess,)


Appendectomy: Yes





- Immunization History


Immunization Up to Date: Yes





- Suicide/Smoking/Psychosocial Hx


Smoking Status: No


Smoking History: Never smoked


Have you smoked in the past 12 months: No


Number of Cigarettes Smoked Daily: 0


Information on smoking cessation initiated: No


Hx Alcohol Use: No


Drug/Substance Use Hx: No


Substance Use Type: None


Hx Substance Use Treatment: No





**Review of Systems





- Review of Systems


Comments:: 





10/02/18 12:25


GENERAL/CONSTITUTIONAL: No fever or chills. No weakness.


HEAD, EYES, EARS, NOSE AND THROAT: No change in vision.  No sore throat.


CARDIOVASCULAR: No chest pain or shortness of breath


RESPIRATORY: No cough, wheezing, or hemoptysis.


GASTROINTESTINAL: No nausea, vomiting, diarrhea or constipation.


GENITOURINARY: No dysuria, frequency, or change in urination.


MUSCULOSKELETAL: No joint pain +leg edema. No neck or back pain.


SKIN: No rash


NEUROLOGIC: No headache, vertigo, loss of consciousness, or change in strength/

sensation.


ENDOCRINE: No increased thirst. No abnormal weight change


HEMATOLOGIC/LYMPHATIC: No anemia, easy bleeding, or history of blood clots.


ALLERGIC/IMMUNOLOGIC: No hives or skin allergy.








*Physical Exam





- Vital Signs


 Last Vital Signs











Temp Pulse Resp BP Pulse Ox


 


 97.9 F   65   18   117/81   100 


 


 10/02/18 11:34  10/02/18 11:34  10/02/18 11:34  10/02/18 11:34  10/02/18 11:34














- Physical Exam


Comments: 





10/02/18 12:26


GENERAL: Awake, alert, and fully oriented, in no acute distress


R HIP: Nontender to palpation, no pain with passive range of motion, no pain 

with axial loading.


HEAD: No signs of trauma, normocephalic, atraumatic


EYES: PERRLA, EOMI, sclera anicteric, conjunctiva clear


ENT: Auricles normal inspection, hearing grossly normal, nares patent, 

oropharynx clear without


exudates. Moist mucosa


NECK: Normal ROM, supple, no lymphadenopathy, JVD, or masses


LUNGS: No distress, speaks full sentences, clear to auscultation bilaterally


HEART: Regular rate and rhythm, normal S1 and S2, no murmurs, rubs or gallops, 

peripheral pulses normal and equal bilaterally.


ABDOMEN: Soft, nontender, normoactive bowel sounds.  No guarding, no rebound.  

No masses


EXTREMITIES: Normal inspection, Normal range of motion, 3+ edema.  No clubbing 

or cyanosis.


NEUROLOGICAL: Cranial nerves II through XII grossly intact.  Normal speech, no 

focal sensorimotor deficits


SKIN: Warm, Dry, normal turgor, no rashes or lesions noted.








ED Treatment Course





- RADIOLOGY


Radiology Studies Ordered: 














 Category Date Time Status


 


 CHEST X-RAY PORTABLE* [RAD] Stat Radiology  10/02/18 12:10 Ordered














Medical Decision Making





- Medical Decision Making





10/02/18 12:27


Patient is 88F with history of DM, HTN, CKD, HLD here today requesting 

placement into nursing home. Vital signs normal and stable. Exam unchanged from 

prior visit. Social work consulted, will attempt to place without admission. Dr Danielle contacted, agrees with plan.


10/02/18 12:32


CXR shows no acute cardiopulmonary process, same as prior cxr.


10/02/18 16:39


EKG shows sinus rhythm with one pvc. No st elevations/depressions. LAFB 

pattern. Lateral t wave inversions.





Admitted to obs via Dr Danielle.








*DC/Admit/Observation/Transfer


Diagnosis at time of Disposition: 


 Unable to ambulate








- Discharge Dispostion


Condition at time of disposition: Stable


Decision to Admit order: Yes





- Referrals


Referrals: 


Jace Mckee MD [Primary Care Provider] - 





- Patient Instructions





- Post Discharge Activity

## 2018-10-02 NOTE — PDOC
Attending Attestation





- Resident


Resident Name: ErickAvni





- ED Attending Attestation


I have performed the following: I have examined & evaluated the patient, The 

case was reviewed & discussed with the resident, I agree w/resident's findings 

& plan, Exceptions are as noted





- HPI


HPI: 





10/02/18 14:30


88F with h/o DM, HTN, CKD, HLD presenting to ED for placement in NH. Pt was 

recently admitted to hospital for NSTEMI and PATRIZIA and discharged home yesterday 

after refusing nursing home placement. However, at home, pt has been unable to 

ambulate unassisted and is requiring more assistance than she anticipated. Pt 

does not have any acute complaints today.





- Physicial Exam


PE: 





10/02/18 15:03


GENERAL: Awake, alert, and fully oriented, in no acute distress.


HEAD: No signs of trauma


EYES: PERRLA, EOMI, sclera anicteric, conjunctiva clear


ENT: Auricles normal inspection, hearing grossly normal, nares patent, 

oropharynx clear without exudates. Moist mucosa


NECK: Nontender, no stepoffs, Normal ROM, supple, no lymphadenopathy, JVD, or 

masses


LUNGS: Breath sounds equal, clear to auscultation bilaterally.  No wheezes, and 

no crackles


HEART: Regular rate and rhythm, normal S1 and S2, no murmurs, rubs or gallops


ABDOMEN: Soft, nontender, normoactive bowel sounds.  No guarding, no rebound.  

No masses


EXTREMITIES: Normal range of motion, no edema.  No clubbing or cyanosis. No 

cords, erythema, or tenderness


NEUROLOGICAL: Cranial nerves II through XII intact. 5/5 strength and sensation 

in all extremities


SKIN: Warm, Dry, normal turgor, no rashes or lesions noted.





- Medical Decision Making





10/02/18 15:04


88 F presenting to ED for placement in NH after being discharged from hospital 

yesterday. Pt with no acute complaints today.


- Case management





Pt admitted to obs

## 2018-10-03 VITALS — SYSTOLIC BLOOD PRESSURE: 131 MMHG | HEART RATE: 71 BPM | DIASTOLIC BLOOD PRESSURE: 80 MMHG | TEMPERATURE: 98.3 F

## 2018-10-03 RX ADMIN — PREDNISONE SCH MG: 20 TABLET ORAL at 01:59

## 2018-10-03 RX ADMIN — INSULIN ASPART SCH UNITS: 100 INJECTION, SOLUTION INTRAVENOUS; SUBCUTANEOUS at 11:29

## 2018-10-03 RX ADMIN — BUDESONIDE AND FORMOTEROL FUMARATE DIHYDRATE SCH: 160; 4.5 AEROSOL RESPIRATORY (INHALATION) at 02:00

## 2018-10-03 RX ADMIN — BUDESONIDE AND FORMOTEROL FUMARATE DIHYDRATE SCH: 160; 4.5 AEROSOL RESPIRATORY (INHALATION) at 11:05

## 2018-10-03 RX ADMIN — GABAPENTIN SCH MG: 100 CAPSULE ORAL at 01:59

## 2018-10-03 RX ADMIN — INSULIN ASPART SCH UNITS: 100 INJECTION, SOLUTION INTRAVENOUS; SUBCUTANEOUS at 06:34

## 2018-10-03 RX ADMIN — PREDNISONE SCH MG: 20 TABLET ORAL at 10:29

## 2018-10-03 RX ADMIN — GABAPENTIN SCH MG: 100 CAPSULE ORAL at 06:33

## 2018-10-03 NOTE — HP
Admitting History and Physical





- Primary Care Physician


PCP: Jace Mckee





- Admission


Chief Complaint: unable to care for self


History of Present Illness: 


ER HISTORY 


 History of Present Illness


Initial Comments: 





10/02/18 12:16


Patient is an 88F with history of DM, HTN, CKD, HLD here today complaining of 

weakness. She was recently admitted for AMS and found to have an nstemi and PATRIZIA 

on CKD. Patient was discharged to home, where she lives alone, after refusing 

going to a nursing home. Her daughter came to her house to help take care of her

, but she was unable to get to the door to open it. She was found on a chair 

with her walker in front of her. She states that she feels a "heaviness" in her 

right leg, denies trauma, denies pain with movement. Denies chest pain, 

shortness of breath. Patient states that she thinks that she can't take care of 

herself and needs to go to a nursing home. Family is in agreement.





Pt seen by me on the medical floors


She was recently admitted for CHF decompensation and weakness. She is unable to 

care for herself at home and needs STR.


She had a recent temporal artery biopsy and she is on tapering prednisone for 

temporal arteritis.


She has decreased vision and weakness


Seen by Rheumatology, Neurology and cardiology last admission. 





History Source: Patient, Family Member, Transfer Record


Limitations to Obtaining History: No Limitations





- Past Medical History


CNS: Yes: Other (chronic headache with negative temporal artery biopsy)


Cardiovascular: Yes: CHF, HTN, Hyperlipdemia


Pulmonary: Yes: COPD


Gastrointestinal: Yes: Constipation, Diverticulosis, Gastritis, GI Bleed, 

Hemorrhoids, Other (colon polyp , ventral incisional hernias, esophageal 

candidiasis )


Renal/: Yes: Renal Inusuff (diabetic nephropathy)


Endocrine: Yes: Diabetes Mellitus, Other (diabetic retinopathy and nephropathy)





- Past Surgical History


Past Surgical History: Yes: Appendectomy, Breast Biopsy, Cataract Removal, 

Colonoscopy (diverticular bleed 2016, right colon polyp removed remotely ), 

Hernia Repair, Hysterectomy, Oopherectomy, Tonsillectomy, Upper Endoscopy (2016 

with esophageal candidiasis, gastritis and duodenitis)





- Smoking History


Smoking history: Never smoked


Have you smoked in the past 12 months: No


Aproximately how many cigarettes per day: 0





- Alcohol/Substance Use


Hx Alcohol Use: No


History of Substance Use: reports: None





- Social History


ADL: Independent


Occupation: retired 


History of Recent Travel: No





Home Medications





- Allergies


Allergies/Adverse Reactions: 


 Allergies











Allergy/AdvReac Type Severity Reaction Status Date / Time


 


diphenhydramine HCl Allergy Severe anaphyl Verified 18 13:14





[From Benadryl]     


 


aspirin Allergy Unknown Swelling Verified 18 13:14


 


natty flavor AdvReac Severe Swelling Verified 18 13:14














- Home Medications


Home Medications: 


Ambulatory Orders





Allopurinol [Zyloprim -] 100 mg PO DAILY 14 


Budesonide/Formeterol Fumarate [SYMBICORT 160/4.5mcg -] 2 inh PO BID 14 


Gabapentin 200 mg PO TID 14 


Glipizide 5 mg PO DAILY 17 


Pantoprazole Sodium [Protonix] 40 mg PO DAILY #30 tablet. 18 


Amlodipine Besylate [Norvasc -] 5 mg PO DAILY #30 tablet 05/10/18 


Atorvastatin Calcium [Lipitor] 20 mg PO DAILY 18 


Polyethylene Glycol 3350 [Miralax 119 gm Btl -] 17 gm PO DAILY 18 


Travoprost [Travatan Z] 1 drop OU HS 18 


Torsemide [Demadex -] 50 mg PO DAILY  tablet 10/01/18 


predniSONE [Deltasone -] 20 mg PO BID  tablet 10/01/18 











Family Disease History





- Family Disease History


Family Disease History: Heart Disease: Father ( MI in his 50's), CA: Mother 

(rectal cancer)





Review of Systems





- Review of Systems


Eyes: reports: Blurred Vision





Physical Examination


Vital Signs: 


 Vital Signs











Temperature  98.3 F   10/03/18 09:06


 


Pulse Rate  71   10/03/18 09:06


 


Respiratory Rate  20   10/03/18 09:06


 


Blood Pressure  131/80   10/03/18 09:06


 


O2 Sat by Pulse Oximetry (%)  99   10/03/18 01:36











Constitutional: Yes: No Distress, Calm


Cardiovascular: Yes: Regular Rate and Rhythm


Respiratory: Yes: Diminished


Gastrointestinal: Yes: Normal Bowel Sounds, Soft, Abdomen, Obese.  No: 

Tenderness


Edema: Yes


Labs: 


 CBC, BMP





 10/02/18 19:15 





 10/02/18 19:15 











Problem List





- Problems


(1) Unable to ambulate


Code(s): R26.2 - DIFFICULTY IN WALKING, NOT ELSEWHERE CLASSIFIED   





(2) (HFpEF) heart failure with preserved ejection fraction


Code(s): I50.9 - HEART FAILURE, UNSPECIFIED   





(3) Anemia


Code(s): D64.9 - ANEMIA, UNSPECIFIED   


Qualifiers: 


   Anemia type: unspecified type   Qualified Code(s): D64.9 - Anemia, 

unspecified   





(4) CKD (chronic kidney disease)


Code(s): N18.9 - CHRONIC KIDNEY DISEASE, UNSPECIFIED   





Assessment/Plan





Deconditioning


-- will need STR for gait and strength


-- continue with meds


-- monitor renal function


-- monitor volume status- continue with Demadex 50 mg daily


dc plan

## 2018-10-03 NOTE — EKG
Test Reason : 

Blood Pressure : ***/*** mmHG

Vent. Rate : 072 BPM     Atrial Rate : 072 BPM

   P-R Int : 108 ms          QRS Dur : 076 ms

    QT Int : 384 ms       P-R-T Axes : 046 -52 148 degrees

   QTc Int : 420 ms

 

SINUS RHYTHM WITH SINUS ARRHYTHMIA WITH SHORT OH WITH OCCASIONAL 

PREMATURE VENTRICULAR COMPLEXES

LEFT ANTERIOR FASCICULAR BLOCK



ABNORMAL ECG

WHEN COMPARED WITH ECG OF 26-SEP-2018 19:01,

T WAVE INVERSION MORE EVIDENT IN LATERAL LEADS

Confirmed by BRIAN MICHELE MD ([id number]) on 10/3/2018 11:55:47 AM

 

Referred By:             Confirmed By:BRIAN MICHELE MD

## 2018-10-03 NOTE — DS
Physical Examination


Vital Signs: 


 Vital Signs











Temperature  98.3 F   10/03/18 09:06


 


Pulse Rate  71   10/03/18 09:06


 


Respiratory Rate  20   10/03/18 09:06


 


Blood Pressure  131/80   10/03/18 09:06


 


O2 Sat by Pulse Oximetry (%)  99   10/03/18 01:36











Labs: 


 CBC, BMP





 10/02/18 19:15 





 10/02/18 19:15 











Discharge Summary


Reason For Visit: UNABLE TO WALK


Current Active Problems





Unable to ambulate (Acute)








Hospital Course: 





see H and P 


Condition: Stable





- Instructions


Referrals: 


Jace Mkcee MD [Primary Care Provider] - 


Disposition: SKILLED NURSING FACILITY





- Home Medications


Comprehensive Discharge Medication List: 


Ambulatory Orders





Allopurinol [Zyloprim -] 100 mg PO DAILY 07/19/14 


Budesonide/Formeterol Fumarate [SYMBICORT 160/4.5mcg -] 2 inh PO BID 07/19/14 


Gabapentin 200 mg PO TID 07/19/14 


Glipizide 5 mg PO DAILY 09/09/17 


Pantoprazole Sodium [Protonix] 40 mg PO DAILY #30 tablet. 02/19/18 


Amlodipine Besylate [Norvasc -] 5 mg PO DAILY #30 tablet 05/10/18 


Atorvastatin Calcium [Lipitor] 20 mg PO DAILY 08/20/18 


Polyethylene Glycol 3350 [Miralax 119 gm Btl -] 17 gm PO DAILY 08/20/18 


Travoprost [Travatan Z] 1 drop OU HS 09/27/18 


Torsemide [Demadex -] 50 mg PO DAILY  tablet 10/01/18 


predniSONE [Deltasone -] 20 mg PO BID  tablet 10/01/18

## 2018-10-29 ENCOUNTER — HOSPITAL ENCOUNTER (INPATIENT)
Dept: HOSPITAL 74 - JER | Age: 83
LOS: 7 days | Discharge: NURSING, REHAB OR LONG-TERM CARE | DRG: 637 | End: 2018-11-05
Admitting: INTERNAL MEDICINE
Payer: COMMERCIAL

## 2018-10-29 VITALS — BODY MASS INDEX: 36.6 KG/M2

## 2018-10-29 DIAGNOSIS — K29.60: ICD-10-CM

## 2018-10-29 DIAGNOSIS — E11.319: ICD-10-CM

## 2018-10-29 DIAGNOSIS — N18.4: ICD-10-CM

## 2018-10-29 DIAGNOSIS — M10.9: ICD-10-CM

## 2018-10-29 DIAGNOSIS — D64.9: ICD-10-CM

## 2018-10-29 DIAGNOSIS — I13.0: ICD-10-CM

## 2018-10-29 DIAGNOSIS — G93.41: ICD-10-CM

## 2018-10-29 DIAGNOSIS — I25.2: ICD-10-CM

## 2018-10-29 DIAGNOSIS — E78.5: ICD-10-CM

## 2018-10-29 DIAGNOSIS — I44.4: ICD-10-CM

## 2018-10-29 DIAGNOSIS — R55: ICD-10-CM

## 2018-10-29 DIAGNOSIS — I50.32: ICD-10-CM

## 2018-10-29 DIAGNOSIS — E11.22: ICD-10-CM

## 2018-10-29 DIAGNOSIS — I12.9: ICD-10-CM

## 2018-10-29 DIAGNOSIS — E11.649: Primary | ICD-10-CM

## 2018-10-29 DIAGNOSIS — E11.21: ICD-10-CM

## 2018-10-29 DIAGNOSIS — J44.9: ICD-10-CM

## 2018-10-29 DIAGNOSIS — N17.9: ICD-10-CM

## 2018-10-29 DIAGNOSIS — Z79.4: ICD-10-CM

## 2018-10-29 LAB
ALBUMIN SERPL-MCNC: 2.8 G/DL (ref 3.4–5)
ALP SERPL-CCNC: 127 U/L (ref 45–117)
ALT SERPL-CCNC: 43 U/L (ref 13–61)
ANION GAP SERPL CALC-SCNC: 10 MMOL/L (ref 8–16)
ANISOCYTOSIS BLD QL: (no result)
AST SERPL-CCNC: 32 U/L (ref 15–37)
BASOPHILS # BLD: 0.4 % (ref 0–2)
BILIRUB SERPL-MCNC: 0.6 MG/DL (ref 0.2–1)
BUN SERPL-MCNC: 77 MG/DL (ref 7–18)
CALCIUM SERPL-MCNC: 8.8 MG/DL (ref 8.5–10.1)
CHLORIDE SERPL-SCNC: 96 MMOL/L (ref 98–107)
CO2 SERPL-SCNC: 31 MMOL/L (ref 21–32)
CREAT SERPL-MCNC: 2.2 MG/DL (ref 0.55–1.3)
DEPRECATED RDW RBC AUTO: 21.8 % (ref 11.6–15.6)
EOSINOPHIL # BLD: 0 % (ref 0–4.5)
GLUCOSE SERPL-MCNC: 56 MG/DL (ref 74–106)
HCT VFR BLD CALC: 31.2 % (ref 32.4–45.2)
HGB BLD-MCNC: 9.9 GM/DL (ref 10.7–15.3)
INR BLD: 0.95 (ref 0.83–1.09)
LYMPHOCYTES # BLD: 10.9 % (ref 8–40)
MCH RBC QN AUTO: 29.2 PG (ref 25.7–33.7)
MCHC RBC AUTO-ENTMCNC: 31.9 G/DL (ref 32–36)
MCV RBC: 91.7 FL (ref 80–96)
MONOCYTES # BLD AUTO: 3.5 % (ref 3.8–10.2)
NEUTROPHILS # BLD: 85.2 % (ref 42.8–82.8)
PLATELET # BLD AUTO: 121 K/MM3 (ref 134–434)
PLATELET BLD QL SMEAR: (no result)
PMV BLD: 9.1 FL (ref 7.5–11.1)
POTASSIUM SERPLBLD-SCNC: 3.8 MMOL/L (ref 3.5–5.1)
PROT SERPL-MCNC: 5.6 G/DL (ref 6.4–8.2)
PT PNL PPP: 11.2 SEC (ref 9.7–13)
RBC # BLD AUTO: 3.4 M/MM3 (ref 3.6–5.2)
SODIUM SERPL-SCNC: 138 MMOL/L (ref 136–145)
WBC # BLD AUTO: 7.2 K/MM3 (ref 4–10)

## 2018-10-29 RX ADMIN — PREDNISONE SCH MG: 10 TABLET ORAL at 23:00

## 2018-10-29 RX ADMIN — ATORVASTATIN CALCIUM SCH MG: 20 TABLET, FILM COATED ORAL at 23:00

## 2018-10-29 RX ADMIN — GABAPENTIN SCH MG: 100 CAPSULE ORAL at 23:00

## 2018-10-29 RX ADMIN — INSULIN ASPART SCH: 100 INJECTION, SOLUTION INTRAVENOUS; SUBCUTANEOUS at 23:00

## 2018-10-29 NOTE — HP
Admitting History and Physical





- Primary Care Physician


PCP: Jace Mckee





- Admission


Chief Complaint: Passed out


History of Present Illness: 





88 yrs old F H/O HTN, T2DM, Dyslipedemia, CKD stage 4 GFR 21-25, , NSTEMI, HFpEF

, not on ASa for GI bleeding, Temporal arteritis diagnosed in 2018,on 

PO Prednisone taper current dose is 15 mg, chronic anemia, current in a [facility] for

  Rehab m, transferred to Ed after a  witnessed episode of TLOC as per NH chart 

patient came back from PT  and passed out for 60 seconds no Seizures activity 

or incontinence, patient remained confussed BIB EMS to Ed for evaluation in the 

Ed hemodynamically stable , alert but confused, EKG shows New onset Afib with  

, patient had 2nd episode of TLOC  that lasted few seconds no seizure 

activity , FS reported 60 , at the time of examination alert but looks weak and 

confused following commands, no documented fever, chills, SOB or Palpitation, 

son is bed side as per son usually she is alert and able to perform all her 

ADLS. 


History Source: Patient, Family Member





- Past Medical History


CNS: Yes: Other (chronic headache with negative temporal artery biopsy)


Cardiovascular: Yes: CHF, HTN, Hyperlipdemia


Pulmonary: Yes: COPD


Gastrointestinal: Yes: Constipation, Diverticulosis, Gastritis, GI Bleed, 

Hemorrhoids, Other (colon polyp , ventral incisional hernias, esophageal 

candidiasis )


Renal/: Yes: Renal Inusuff (diabetic nephropathy)


Endocrine: Yes: Diabetes Mellitus, Other (diabetic retinopathy and nephropathy)





- Past Surgical History


Past Surgical History: Yes: Appendectomy, Breast Biopsy, Cataract Removal, 

Colonoscopy (diverticular bleed 2016, right colon polyp removed remotely ), 

Hernia Repair, Hysterectomy, Oopherectomy, Tonsillectomy, Upper Endoscopy (2016 

with esophageal candidiasis, gastritis and duodenitis)





- Smoking History


Smoking history: Never smoked


Have you smoked in the past 12 months: No


Aproximately how many cigarettes per day: 0





- Alcohol/Substance Use


Hx Alcohol Use: No


History of Substance Use: reports: None





- Social History


ADL: Independent


Occupation: retired 


History of Recent Travel: No





Home Medications





- Allergies


Allergies/Adverse Reactions: 


 Allergies











Allergy/AdvReac Type Severity Reaction Status Date / Time


 


diphenhydramine HCl Allergy Severe anaphyl Verified 10/29/18 14:28





[From Benadryl]     


 


aspirin Allergy Unknown Swelling Verified 10/29/18 14:28


 


natty flavor AdvReac Severe Swelling Verified 10/29/18 14:28














- Home Medications


Home Medications: 


Ambulatory Orders





Acetaminophen 325 mg PO QID PRN 10/29/18 


Allopurinol [Zyloprim -] 100 mg PO DAILY 10/29/18 


Amlodipine Besylate [Norvasc -] 5 mg PO DAILY 10/29/18 


Atorvastatin Ca [Lipitor] 20 mg PO HS 10/29/18 


Ciprofloxacin [Cipro (Restricted To Id)] 250 mg PO BID 10/29/18 


Furosemide [Lasix] 40 mg PO BID 10/29/18 


Gabapentin [Neurontin -] 200 mg PO Q8H 10/29/18 


Glipizide [Glucotrol -] 5 mg PO DAILY 10/29/18 


Insulin (Novolog) [Novolog Vial] 0 units SQ ASDIR 10/29/18 


Insulin Glargine,Hum.rec.anlog [Lantus] 16 unit SQ BID 10/29/18 


Latanoprost/Pf [Latanoprost 0.005% Eye Drop] 1 drop OU HS 10/29/18 


Pantoprazole Sodium [Protonix] 40 mg PO DAILY 10/29/18 


Polyethylene Glycol 3350 [Gavilax] 17 gm PO DAILY 10/29/18 


Prednisone 15 mg PO DAILY 10/29/18 


predniSONE [Deltasone -] 10 mg PO HS 10/29/18 











Family Disease History





- Family Disease History


Family Disease History: Heart Disease: Father ( MI in his 50's), CA: Mother 

(rectal cancer)





Review of Systems





- Review of Systems


Constitutional: denies: Chills, Diaphoresis, Fever, Lethargy, Loss of Appetite


Eyes: denies: Blind Spots, Blurred Vision


HENT: denies: Difficult Swallowing, Ear Discharge, Ear Pain


Neck: denies: Decreased ROM, Lumps, Pain on Movement


Cardiovascular: reports: Palpitations.  denies: Chest Pain, Edema, Shortness of 

Breath


Respiratory: denies: Cough, Exercise Intolerance, Hemoptysis


Gastrointestinal: denies: Abdominal Pain, Bloating, Constipation


Genitourinary: denies: Burning, Discharge


Musculoskeletal: denies: Back Pain, Crepitus


Neurological: reports: Syncope.  denies: Change in Speech, Confusion, Dizziness


Endocrine: denies: Excessive Sweating, Flushing


Hematology/Lymphatic: denies: Easily Bruised, Excessive Bleeding, Swollen Glands


Psychiatric: denies: Altered Sleep Pattern, Anxiety, Depression





Physical Examination


Vital Signs: 


 Vital Signs











Temperature  98.2 F   10/29/18 13:58


 


Pulse Rate  69   10/29/18 13:58


 


Respiratory Rate  16   10/29/18 13:58


 


Blood Pressure  140/87   10/29/18 13:58


 


O2 Sat by Pulse Oximetry (%)  100   10/29/18 13:58











Constitutional: Yes: Well Nourished, No Distress, Calm, Ashen


HENT: Yes: WNL, Atraumatic, Normocephalic, Other (MM moist anemia)


Neck: Yes: Supple, Trachea Midline.  No: Decreased ROM, Lymphadenopathy


Cardiovascular: Yes: Pulse Irregular, S1, S2.  No: JVD, Gallop


Respiratory: Yes: CTA Bilaterally


Gastrointestinal: Yes: Normal Bowel Sounds, Soft


Musculoskeletal: No: Back Pain, Joint Stiffness


Extremities: No: Calf Tenderness


Edema: Yes


Edema: RUE: 2+, LLE: 2+


Peripheral Pulses: Left Doralis Pedis: 1+, Right Dorsalis Pedis: 1+


Neurological: Yes: Alert, Oriented


...Motor Strength: WNL, LUE, LLE, RUE, RLE


Labs: 


 CBC, BMP





 10/29/18 15:15 





 10/29/18 15:15 





CBC,CMP











WBC  7.2 K/mm3 (4.0-10.0)   10/29/18  15:15    


 


RBC  3.40 M/mm3 (3.60-5.2)  L  10/29/18  15:15    


 


Hgb  9.9 GM/dL (10.7-15.3)  L  10/29/18  15:15    


 


Hct  31.2 % (32.4-45.2)  L  10/29/18  15:15    


 


MCV  91.7 fl (80-96)   10/29/18  15:15    


 


MCH  29.2 pg (25.7-33.7)   10/29/18  15:15    


 


MCHC  31.9 g/dl (32.0-36.0)  L  10/29/18  15:15    


 


RDW  21.8 % (11.6-15.6)  H  10/29/18  15:15    


 


Plt Count  121 K/MM3 (134-434)  L D 10/29/18  15:15    


 


MPV  9.1 fl (7.5-11.1)   10/29/18  15:15    


 


Absolute Neuts (auto)  6.2 K/mm3 (1.5-8.0)   10/29/18  15:15    


 


Neutrophils %  85.2 % (42.8-82.8)  H  10/29/18  15:15    


 


Neutrophils % (Manual)  78.0 % (42.8-82.8)   10/29/18  15:15    


 


Band Neutrophils %  6.0 %  10/29/18  15:15    


 


Lymphocytes %  10.9 % (8-40)  D 10/29/18  15:15    


 


Lymphocytes % (Manual)  10.0 % (8-40)   10/29/18  15:15    


 


Monocytes %  3.5 % (3.8-10.2)  L  10/29/18  15:15    


 


Monocytes % (Manual)  6 % (3.8-10.2)   10/29/18  15:15    


 


Eosinophils %  0.0 % (0-4.5)   10/29/18  15:15    


 


Eosinophils % (Manual)  0.0 % (0-4.5)   10/29/18  15:15    


 


Basophils %  0.4 % (0-2.0)   10/29/18  15:15    


 


Basophils % (Manual)  0.0 % (0-2.0)   10/29/18  15:15    


 


Nucleated RBC %  0 % (0-0)   10/29/18  15:15    


 


Platelet Estimate  Slt decrease   10/29/18  15:15    


 


Platelet Comment  No clumping noted   10/29/18  15:15    


 


Anisocytosis  1+   10/29/18  15:15    


 


Sodium  138 mmol/L (136-145)   10/29/18  15:15    


 


Potassium  3.8 mmol/L (3.5-5.1)   10/29/18  15:15    


 


Chloride  96 mmol/L ()  L  10/29/18  15:15    


 


Carbon Dioxide  31 mmol/L (21-32)   10/29/18  15:15    


 


Anion Gap  10 MMOL/L (8-16)   10/29/18  15:15    


 


BUN  77 mg/dL (7-18)  H  10/29/18  15:15    


 


Creatinine  2.2 mg/dL (0.55-1.3)  H  10/29/18  15:15    


 


Creat Clearance w eGFR  21.06  (>60)   10/29/18  15:15    


 


Random Glucose  56 mg/dL ()  L  10/29/18  15:15    


 


Calcium  8.8 mg/dL (8.5-10.1)   10/29/18  15:15    


 


Total Bilirubin  0.6 mg/dL (0.2-1)   10/29/18  15:15    


 


AST  32 U/L (15-37)   10/29/18  15:15    


 


ALT  43 U/L (13-61)   10/29/18  15:15    


 


Alkaline Phosphatase  127 U/L ()  H  10/29/18  15:15    


 


Creatine Kinase  54 IU/L ()   10/29/18  15:15    


 


Troponin I  0.11 ng/ml (0.00-0.05)  H  10/29/18  15:15    


 


Total Protein  5.6 g/dl (6.4-8.2)  L  10/29/18  15:15    


 


Albumin  2.8 g/dl (3.4-5.0)  L  10/29/18  15:15    














Imaging





- Results


Chest X-ray: Report Reviewed (No acute infiltrates)


Cat Scan: Report Reviewed


EKG: Report Reviewed (Afib with  no acute ST T changes)





Problem List





- Problems


(1) Syncope


Assessment/Plan: 


ERecurrent episodes of TLOC in the setting of new onset Afib and Hypoglycemia, 

possibility of Syncope/seizure,  will admit for evaluation,  Neuro Check, NPO, 

Swallow evaluation in am CT head (prelim) no acute changes, bed rest, fall 

precautions.


Code(s): R55 - SYNCOPE AND COLLAPSE   


Qualifiers: 


   Syncope type: unspecified   Qualified Code(s): R55 - Syncope and collapse   





(2) Acute on chronic renal failure


Assessment/Plan: 


At base line CKDsatge 4  on fluid restriction will F/U BMP after gentle 

Hydration.


Code(s): N17.9 - ACUTE KIDNEY FAILURE, UNSPECIFIED; N18.9 - CHRONIC KIDNEY 

DISEASE, UNSPECIFIED   





(3) New onset atrial fibrillation


Assessment/Plan: 


Rtae controlled not a candidate of AC although [id number] is high observe will F/

U Cardiology recommondations


Code(s): I48.91 - UNSPECIFIED ATRIAL FIBRILLATION   





(4) (HFpEF) heart failure with preserved ejection fraction


Assessment/Plan: 


 Last ECHO normal EFin 2018  will F/U BNP , hold Lasix pending Rpt BMP in 

am


Code(s): I50.9 - HEART FAILURE, UNSPECIFIED   





(5) Anemia


Assessment/Plan: 


Chronic H/H are stable


Code(s): D64.9 - ANEMIA, UNSPECIFIED   


Qualifiers: 


 





(6) COPD (chronic obstructive pulmonary disease)


Assessment/Plan: 


H/O COPD cont current bronchodilators


Code(s): J44.9 - CHRONIC OBSTRUCTIVE PULMONARY DISEASE, UNSPECIFIED   





(7) Diabetes mellitus


Assessment/Plan: 


T@DM on Insulin and Oral meds will  Hold PO meds  and Lantus  D5 infusion for 

Hypoglycemmia with correction dose insulin.


Code(s): E11.9 - TYPE 2 DIABETES MELLITUS WITHOUT COMPLICATIONS   





(8) CKD (chronic kidney disease)


Assessment/Plan: 


Worsening functions due to Dehydration


Code(s): N18.9 - CHRONIC KIDNEY DISEASE, UNSPECIFIED   


Qualifiers: 


   Chronic kidney disease stage: stage 4 (severe)   Qualified Code(s): N18.4 - 

Chronic kidney disease, stage 4 (severe)   





(9) Hypertension


Assessment/Plan: 


Well controlled resume all home meds


Code(s): I10 - ESSENTIAL (PRIMARY) HYPERTENSION   


Qualifiers: 


 





(10) Temporal arteritis


Assessment/Plan: 


on Po prednisone cecilia current dose 15m mg daily


Code(s): M31.6 - OTHER GIANT CELL ARTERITIS   





(11) Hypoglycemia


Assessment/Plan: 


EPISED OF Hpoglycemia in ED will Hold PO meds and Lantus, recived D50 in ED 

cont D5 1/2 NS with K and  correction dose insulin.


Code(s): E16.2 - HYPOGLYCEMIA, UNSPECIFIED

## 2018-10-29 NOTE — CON.NEURO
Consult





- Past Medical History


CNS: Yes: Other (chronic headache with negative temporal artery biopsy)


Cardio/Vascular: Yes: CHF, HTN, Hyperlipdemia


Pulmonary: Yes: COPD


Gastrointestinal: Yes: Constipation, Diverticulosis, Gastritis, GI Bleed, 

Hemorrhoids, Other (colon polyp , ventral incisional hernias, esophageal 

candidiasis )


Renal/: Yes: Renal Inusuff (diabetic nephropathy)


Endocrine: Yes: Diabetes Mellitus, Other (diabetic retinopathy and nephropathy)





- Past Surgical History


Past Surgical History: Yes: Appendectomy, Breast Biopsy, Cataract Removal, 

Colonoscopy (diverticular bleed , right colon polyp removed remotely ), 

Hernia Repair, Hysterectomy, Oopherectomy, Tonsillectomy, Upper Endoscopy (2016 

with esophageal candidiasis, gastritis and duodenitis)





- Alcohol/Substance Use


Hx Alcohol Use: No


History of Substance Use: reports: None





- Smoking History


Smoking history: Never smoked


Have you smoked in the past 12 months: No


Aproximately how many cigarettes per day: 0





- Social History


Usual Living Arrangement: Alone


ADL: Independent


Occupation: retired 


History of Recent Travel: No





Home Medications





- Allergies


Allergies/Adverse Reactions: 


 Allergies











Allergy/AdvReac Type Severity Reaction Status Date / Time


 


diphenhydramine HCl Allergy Severe anaphyl Verified 10/29/18 14:28





[From Benadryl]     


 


aspirin Allergy Unknown Swelling Verified 10/29/18 14:28


 


natty flavor AdvReac Severe Swelling Verified 10/29/18 14:28














- Home Medications


Home Medications: 


Ambulatory Orders





Acetaminophen 325 mg PO QID PRN 10/29/18 


Allopurinol [Zyloprim -] 100 mg PO DAILY 10/29/18 


Amlodipine Besylate [Norvasc -] 5 mg PO DAILY 10/29/18 


Atorvastatin Ca [Lipitor] 20 mg PO HS 10/29/18 


Ciprofloxacin [Cipro (Restricted To Id)] 250 mg PO BID 10/29/18 


Furosemide [Lasix] 40 mg PO BID 10/29/18 


Gabapentin [Neurontin -] 200 mg PO Q8H 10/29/18 


Glipizide [Glucotrol -] 5 mg PO DAILY 10/29/18 


Insulin (Novolog) [Novolog Vial] 0 units SQ ASDIR 10/29/18 


Insulin Glargine,Hum.rec.anlog [Lantus] 16 unit SQ BID 10/29/18 


Latanoprost/Pf [Latanoprost 0.005% Eye Drop] 1 drop OU HS 10/29/18 


Pantoprazole Sodium [Protonix] 40 mg PO DAILY 10/29/18 


Polyethylene Glycol 3350 [Gavilax] 17 gm PO DAILY 10/29/18 


Prednisone 15 mg PO DAILY 10/29/18 


predniSONE [Deltasone -] 10 mg PO HS 10/29/18 











Family Disease History





- Family Disease History


Family Disease History: Heart Disease: Father ( MI in his 50's), CA: Mother 

(rectal cancer)





Physical Exam-Neuro


Vital Signs: 


 Vital Signs











Temperature  98.2 F   10/29/18 13:58


 


Pulse Rate  66   10/29/18 17:35


 


Respiratory Rate  20   10/29/18 17:35


 


Blood Pressure  108/67   10/29/18 17:35


 


O2 Sat by Pulse Oximetry (%)  97   10/29/18 17:35











Labs: 


 CBC, BMP





 10/29/18 15:15 





 10/29/18 15:15 





 INR, PTT











INR  0.95  (0.83-1.09)   10/29/18  15:15    














Assessment/Plan





cc Episode of LOC


HPI: 88 year old female history of DM, HLD, CKD, HTN, Temporal arteritis. 

Patient is on tapering dose of steroid. Patient lives in NH /Rehab. Milagros has 

episode of LOC lasting for sixty secons. She has no seizure like activity . 

According to chart Sugar was 60, ct head is normal. She has new onset atrial 

fbrillation. According to son, she has been weak and mentally not very leidy 

recently for past two months. 


PMH CHF, HTN, HLD, Diabetic Nephropathy, Diabetic retinopathy.





Past Surgical History: Yes: Appendectomy, Breast Biopsy, Cataract Removal, 

Colonoscopy (diverticular bleed 2016, right colon polyp removed remotely ), 

Hernia Repair, Hysterectomy, Oopherectomy, Tonsillectomy, Upper Endoscopy (2016 

with esophageal candidiasis, gastritis and duodenitis)


Social History, no toxic habits, NH resient


FH, ROS reviewed in chart





Allergies/Adverse Reactions: 


 Allergies











Allergy/AdvReac Type Severity Reaction Status Date / Time


 


diphenhydramine HCl Allergy Severe anaphyl Verified 10/29/18 14:28





[From Benadryl]     


 


aspirin Allergy Unknown Swelling Verified 10/29/18 14:28


 


natty flavor AdvReac Severe Swelling Verified 10/29/18 14:28














Home Medications: 





Acetaminophen 325 mg PO QID PRN 10/29/18 


Allopurinol [Zyloprim -] 100 mg PO DAILY 10/29/18 


Amlodipine Besylate [Norvasc -] 5 mg PO DAILY 10/29/18 


Atorvastatin Ca [Lipitor] 20 mg PO HS 10/29/18 


Ciprofloxacin [Cipro (Restricted To Id)] 250 mg PO BID 10/29/18 


Furosemide [Lasix] 40 mg PO BID 10/29/18 


Gabapentin [Neurontin -] 200 mg PO Q8H 10/29/18 


Glipizide [Glucotrol -] 5 mg PO DAILY 10/29/18 


Insulin (Novolog) [Novolog Vial] 0 units SQ ASDIR 10/29/18 


Insulin Glargine,Hum.rec.anlog [Lantus] 16 unit SQ BID 10/29/18 


Latanoprost/Pf [Latanoprost 0.005% Eye Drop] 1 drop OU HS 10/29/18 


Pantoprazole Sodium [Protonix] 40 mg PO DAILY 10/29/18 


Polyethylene Glycol 3350 [Gavilax] 17 gm PO DAILY 10/29/18 


Prednisone 15 mg PO DAILY 10/29/18 


predniSONE [Deltasone -] 10 mg PO HS 10/29/18 








Neurological Examination


Alert oriented x 2, she know she is at [facility], recognizes her son, 

and could not tell date, she was able to tell


her age, speech is normal,


eomi, pupils reactive, no face asymmetry


motor 5/5 all extremity


sensation is noraml, reflexa re generalized diminished





ct head unremarkable








1. Most likley to be syncope, less likely to be stroke or seizure. Given son 

things she has been slow and more confused recently, I would suggest to get mri 

of  brain. B12,folate tsh can also be obtain.eeg can also be obtained





2. Slowly developing mental status change could be due to metabolic 

encphalopathy including steroid use and renal failure, clinically no evidence 

of meningitis





Continue supportive care and PT





Thanking you so much

## 2018-10-29 NOTE — PDOC
Attending Attestation





- HPI


HPI: 





10/29/18 16:32


88 year old female with past medical history of hypertension, diabetes, CKD, 

hyperlipidemia, diverticulitis, GI bleed who was sent from a nursing home 

status post syncopal episode today. Patient was laying in bed and son describes 

the patient became unresponsive for one minute. Son denies this ever happening 

in the past. Denies any fevers or  chills, NVD, cough, SOB, chest pain, or 

urinary complaints. 














- Medical Decision Making





10/29/18 16:37


Documentation prepared by Alyson Carrillo, acting as medical scribe for 

Lily Rene MD.











<Alyson Carrillo - Last Filed: 10/29/18 18:04>





- Resident


Resident Name: Walker Alexis





- ED Attending Attestation


I have performed the following: I have examined & evaluated the patient, The 

case was reviewed & discussed with the resident, I agree w/resident's findings 

& plan, Exceptions are as noted





- Physicial Exam


PE: 





10/29/18 17:57


awake alert eyes open. lungs clear bilaterally. heart irreg regualr. no mrg. 

abd soft nt nd. ext wwp bilat pitting  3+ edema. no calf tendernes.. nuero pt 

alert and oriented x 3. resting tremor right arm





- Medical Decision Making








10/29/18 17:58


differential syncope, siezure, hypoglycemia, electrlyte abnoramlity. pt with 

new onset afib . plan ct head. labs ekg trop. will d/w dr. mary sherman 

has seen pt in the past. 


10/29/18 22:59


pt episode while in ed of [facility], became unresponsive.  per nursin at bedside was 

immediately appearin postictal confused altered. howver pt glucose 60. possible 

hypoglycemia culprit.. given amp dextrose . will admit for observation. nuero 

eval and cardiac rul out. 





<Lily Rene - Last Filed: 10/29/18 23:00>





**Heart Score/ECG Review


  ** #1


General ECG Interpretation: Normal Rate (108 afib with rvr.), Normal Intervals, 

No acute ischemic changes


Compared to previous ECG there are: Other (TWI I, AVL,)





<Lily Rene - Last Filed: 10/29/18 23:00>

## 2018-10-30 LAB
ALBUMIN SERPL-MCNC: 2.6 G/DL (ref 3.4–5)
ALP SERPL-CCNC: 114 U/L (ref 45–117)
ALT SERPL-CCNC: 36 U/L (ref 13–61)
ANION GAP SERPL CALC-SCNC: 14 MMOL/L (ref 8–16)
AST SERPL-CCNC: 20 U/L (ref 15–37)
BASOPHILS # BLD: 0.2 % (ref 0–2)
BILIRUB SERPL-MCNC: 0.5 MG/DL (ref 0.2–1)
BUN SERPL-MCNC: 78 MG/DL (ref 7–18)
CALCIUM SERPL-MCNC: 8.1 MG/DL (ref 8.5–10.1)
CHLORIDE SERPL-SCNC: 97 MMOL/L (ref 98–107)
CO2 SERPL-SCNC: 28 MMOL/L (ref 21–32)
CREAT SERPL-MCNC: 2.2 MG/DL (ref 0.55–1.3)
DEPRECATED RDW RBC AUTO: 21 % (ref 11.6–15.6)
EOSINOPHIL # BLD: 0 % (ref 0–4.5)
GLUCOSE SERPL-MCNC: 168 MG/DL (ref 74–106)
HCT VFR BLD CALC: 28.2 % (ref 32.4–45.2)
HGB BLD-MCNC: 9.1 GM/DL (ref 10.7–15.3)
LYMPHOCYTES # BLD: 8.8 % (ref 8–40)
MCH RBC QN AUTO: 29.3 PG (ref 25.7–33.7)
MCHC RBC AUTO-ENTMCNC: 32.1 G/DL (ref 32–36)
MCV RBC: 91.1 FL (ref 80–96)
MONOCYTES # BLD AUTO: 3.1 % (ref 3.8–10.2)
NEUTROPHILS # BLD: 87.9 % (ref 42.8–82.8)
PLATELET # BLD AUTO: 95 K/MM3 (ref 134–434)
PMV BLD: 9.1 FL (ref 7.5–11.1)
POTASSIUM SERPLBLD-SCNC: 3.4 MMOL/L (ref 3.5–5.1)
PROT SERPL-MCNC: 5 G/DL (ref 6.4–8.2)
RBC # BLD AUTO: 3.1 M/MM3 (ref 3.6–5.2)
SODIUM SERPL-SCNC: 139 MMOL/L (ref 136–145)
WBC # BLD AUTO: 7.8 K/MM3 (ref 4–10)

## 2018-10-30 RX ADMIN — GABAPENTIN SCH MG: 100 CAPSULE ORAL at 13:34

## 2018-10-30 RX ADMIN — METOPROLOL TARTRATE SCH MG: 25 TABLET, FILM COATED ORAL at 14:42

## 2018-10-30 RX ADMIN — ALLOPURINOL SCH: 100 TABLET ORAL at 09:32

## 2018-10-30 RX ADMIN — ATORVASTATIN CALCIUM SCH MG: 20 TABLET, FILM COATED ORAL at 22:00

## 2018-10-30 RX ADMIN — GABAPENTIN SCH MG: 100 CAPSULE ORAL at 22:00

## 2018-10-30 RX ADMIN — AMLODIPINE BESYLATE SCH MG: 5 TABLET ORAL at 12:51

## 2018-10-30 RX ADMIN — INSULIN ASPART SCH UNITS: 100 INJECTION, SOLUTION INTRAVENOUS; SUBCUTANEOUS at 17:48

## 2018-10-30 RX ADMIN — GABAPENTIN SCH MG: 100 CAPSULE ORAL at 05:44

## 2018-10-30 RX ADMIN — PANTOPRAZOLE SODIUM SCH MG: 40 TABLET, DELAYED RELEASE ORAL at 12:50

## 2018-10-30 RX ADMIN — ALLOPURINOL SCH MG: 100 TABLET ORAL at 12:51

## 2018-10-30 RX ADMIN — INSULIN ASPART SCH UNITS: 100 INJECTION, SOLUTION INTRAVENOUS; SUBCUTANEOUS at 03:14

## 2018-10-30 RX ADMIN — LATANOPROST SCH DROP: 50 SOLUTION OPHTHALMIC at 22:18

## 2018-10-30 RX ADMIN — METOPROLOL TARTRATE SCH MG: 25 TABLET, FILM COATED ORAL at 22:14

## 2018-10-30 RX ADMIN — INSULIN ASPART SCH UNITS: 100 INJECTION, SOLUTION INTRAVENOUS; SUBCUTANEOUS at 06:38

## 2018-10-30 RX ADMIN — PANTOPRAZOLE SODIUM SCH: 40 TABLET, DELAYED RELEASE ORAL at 09:32

## 2018-10-30 RX ADMIN — PREDNISONE SCH MG: 10 TABLET ORAL at 22:00

## 2018-10-30 RX ADMIN — AMLODIPINE BESYLATE SCH: 5 TABLET ORAL at 09:32

## 2018-10-30 RX ADMIN — INSULIN ASPART SCH: 100 INJECTION, SOLUTION INTRAVENOUS; SUBCUTANEOUS at 12:50

## 2018-10-30 RX ADMIN — POLYETHYLENE GLYCOL 3350 SCH: 17 POWDER, FOR SOLUTION ORAL at 09:31

## 2018-10-30 RX ADMIN — LATANOPROST SCH DROP: 50 SOLUTION OPHTHALMIC at 03:15

## 2018-10-30 NOTE — PN
Progress Note (short form)





- Note


Progress Note: 





events noted


pt is confused but recognizes me


denies any complaints


 Vital Signs - 24 hr











  10/29/18 10/29/18 10/29/18





  13:58 17:35 23:43


 


Temperature 98.2 F  


 


Pulse Rate 69  


 


Pulse Rate [  66 84





Apical]   


 


Respiratory 16 20 20





Rate   


 


Blood Pressure 140/87  


 


Blood Pressure  108/67 142/100





[Right Arm]   


 


O2 Sat by Pulse 100 97 97





Oximetry (%)   














  10/30/18 10/30/18 10/30/18





  01:55 02:05 02:07


 


Temperature  97.7 F 97.7 F


 


Pulse Rate  89 89


 


Pulse Rate [   





Apical]   


 


Respiratory 20 18 18





Rate   


 


Blood Pressure  107/64 107/64


 


Blood Pressure   





[Right Arm]   


 


O2 Sat by Pulse   





Oximetry (%)   














  10/30/18 10/30/18 10/30/18





  02:32 06:33 11:33


 


Temperature   


 


Pulse Rate  85 


 


Pulse Rate [   





Apical]   


 


Respiratory 18 18 18





Rate   


 


Blood Pressure  120/64 


 


Blood Pressure   





[Right Arm]   


 


O2 Sat by Pulse   





Oximetry (%)   














  10/30/18





  11:36


 


Temperature 


 


Pulse Rate 94 H


 


Pulse Rate [ 





Apical] 


 


Respiratory 20





Rate 


 


Blood Pressure 116/59 L


 


Blood Pressure 





[Right Arm] 


 


O2 Sat by Pulse 





Oximetry (%) 








 Current Medications











Generic Name Dose Route Start Last Admin





  Trade Name Freq  PRN Reason Stop Dose Admin


 


Acetaminophen  325 mg  10/29/18 17:32  





  Tylenol -  PO   





  Q6H PRN   





  PAIN   





     





     





     


 


Allopurinol  100 mg  10/30/18 10:00  10/30/18 12:51





  Zyloprim -  PO   100 mg





  DAILY ELLA   Administration





     





     





     





     


 


Amlodipine Besylate  5 mg  10/30/18 10:00  10/30/18 12:51





  Norvasc -  PO   5 mg





  DAILY ELLA   Administration





     





     





     





     


 


Atorvastatin Calcium  20 mg  10/29/18 22:00  10/29/18 23:00





  Lipitor -  PO   20 mg





  HS ELLA   Administration





     





     





     





     


 


Gabapentin  200 mg  10/29/18 22:00  10/30/18 05:44





  Neurontin -  PO   200 mg





  TID ELLA   Administration





     





     





     





     


 


Insulin Aspart  1 vial  10/29/18 18:45  10/30/18 12:50





  Novolog Vial Sliding Scale -  SQ   Not Given





  Q6H ELLA   





     





     





  Protocol   





     


 


Latanoprost  1 drop  10/29/18 22:00  10/30/18 03:15





  Xalatan 0.005% Eye Drops -  OU   1 drop





  HS ELLA   Administration





     





     





     





     


 


Pantoprazole Sodium  40 mg  10/30/18 10:00  10/30/18 12:50





  Protonix -  PO   40 mg





  DAILY ELLA   Administration





     





     





     





     


 


Polyethylene Glycol  17 gm  10/30/18 10:00  10/30/18 09:31





  Miralax (For Daily Use) -  PO   Not Given





  DAILY ELLA   





     





     





     





     


 


Prednisone 10 mg/ Prednisone 5  15 mg  10/29/18 22:00  10/29/18 23:00





  mg  PO   15 mg





  HS ELLA   Administration





     





     





     





     








 Laboratory Results - last 24 hr











  10/29/18 10/29/18 10/29/18





  15:15 15:15 15:15


 


WBC  7.2  


 


RBC  3.40 L  


 


Hgb  9.9 L  


 


Hct  31.2 L  


 


MCV  91.7  


 


MCH  29.2  


 


MCHC  31.9 L  


 


RDW  21.8 H  


 


Plt Count  121 L D  


 


MPV  9.1  


 


Absolute Neuts (auto)  6.2  


 


Neutrophils %  85.2 H  


 


Neutrophils % (Manual)  78.0  


 


Band Neutrophils %  6.0  


 


Lymphocytes %  10.9  D  


 


Lymphocytes % (Manual)  10.0  


 


Monocytes %  3.5 L  


 


Monocytes % (Manual)  6  


 


Eosinophils %  0.0  


 


Eosinophils % (Manual)  0.0  


 


Basophils %  0.4  


 


Basophils % (Manual)  0.0  


 


Nucleated RBC %  0  


 


Platelet Estimate  Slt decrease  


 


Platelet Comment  No clumping noted  


 


Anisocytosis  1+  


 


PT with INR   11.20 


 


INR   0.95 


 


Sodium    138


 


Potassium    3.8


 


Chloride    96 L


 


Carbon Dioxide    31


 


Anion Gap    10


 


BUN    77 H


 


Creatinine    2.2 H


 


Creat Clearance w eGFR    21.06


 


POC Glucometer   


 


Random Glucose    56 L


 


Hemoglobin A1c %   


 


Calcium    8.8


 


Magnesium   


 


Total Bilirubin    0.6


 


AST    32


 


ALT    43


 


Alkaline Phosphatase    127 H


 


Creatine Kinase    54


 


Troponin I    0.11 H


 


Total Protein    5.6 L


 


Albumin    2.8 L


 


Vitamin B12   


 


TSH   














  10/29/18 10/29/18 10/29/18





  17:51 19:59 21:50


 


WBC   


 


RBC   


 


Hgb   


 


Hct   


 


MCV   


 


MCH   


 


MCHC   


 


RDW   


 


Plt Count   


 


MPV   


 


Absolute Neuts (auto)   


 


Neutrophils %   


 


Neutrophils % (Manual)   


 


Band Neutrophils %   


 


Lymphocytes %   


 


Lymphocytes % (Manual)   


 


Monocytes %   


 


Monocytes % (Manual)   


 


Eosinophils %   


 


Eosinophils % (Manual)   


 


Basophils %   


 


Basophils % (Manual)   


 


Nucleated RBC %   


 


Platelet Estimate   


 


Platelet Comment   


 


Anisocytosis   


 


PT with INR   


 


INR   


 


Sodium   


 


Potassium   


 


Chloride   


 


Carbon Dioxide   


 


Anion Gap   


 


BUN   


 


Creatinine   


 


Creat Clearance w eGFR   


 


POC Glucometer  [phone]  [phone] 


 


Random Glucose   


 


Hemoglobin A1c %   


 


Calcium   


 


Magnesium   


 


Total Bilirubin   


 


AST   


 


ALT   


 


Alkaline Phosphatase   


 


Creatine Kinase   


 


Troponin I    0.15 H


 


Total Protein   


 


Albumin   


 


Vitamin B12   


 


TSH   














  10/29/18 10/30/18 10/30/18





  21:50 03:08 05:30


 


WBC    7.8


 


RBC    3.10 L


 


Hgb    9.1 L


 


Hct    28.2 L


 


MCV    91.1


 


MCH    29.3


 


MCHC    32.1


 


RDW    21.0 H


 


Plt Count    95 L D


 


MPV    9.1


 


Absolute Neuts (auto)    6.9


 


Neutrophils %    87.9 H


 


Neutrophils % (Manual)   


 


Band Neutrophils %   


 


Lymphocytes %    8.8


 


Lymphocytes % (Manual)   


 


Monocytes %    3.1 L


 


Monocytes % (Manual)   


 


Eosinophils %    0.0


 


Eosinophils % (Manual)   


 


Basophils %    0.2


 


Basophils % (Manual)   


 


Nucleated RBC %    0


 


Platelet Estimate   


 


Platelet Comment   


 


Anisocytosis   


 


PT with INR   


 


INR   


 


Sodium   


 


Potassium   


 


Chloride   


 


Carbon Dioxide   


 


Anion Gap   


 


BUN   


 


Creatinine   


 


Creat Clearance w eGFR   


 


POC Glucometer   193 


 


Random Glucose   


 


Hemoglobin A1c %   


 


Calcium   


 


Magnesium   


 


Total Bilirubin   


 


AST   


 


ALT   


 


Alkaline Phosphatase   


 


Creatine Kinase   


 


Troponin I   


 


Total Protein   


 


Albumin   


 


Vitamin B12   


 


TSH  0.74  














  10/30/18 10/30/18 10/30/18





  05:30 05:30 05:30


 


WBC   


 


RBC   


 


Hgb   


 


Hct   


 


MCV   


 


MCH   


 


MCHC   


 


RDW   


 


Plt Count   


 


MPV   


 


Absolute Neuts (auto)   


 


Neutrophils %   


 


Neutrophils % (Manual)   


 


Band Neutrophils %   


 


Lymphocytes %   


 


Lymphocytes % (Manual)   


 


Monocytes %   


 


Monocytes % (Manual)   


 


Eosinophils %   


 


Eosinophils % (Manual)   


 


Basophils %   


 


Basophils % (Manual)   


 


Nucleated RBC %   


 


Platelet Estimate   


 


Platelet Comment   


 


Anisocytosis   


 


PT with INR   


 


INR   


 


Sodium  139  


 


Potassium  3.4 L  


 


Chloride  97 L  


 


Carbon Dioxide  28  


 


Anion Gap  14  


 


BUN  78 H  


 


Creatinine  2.2 H  


 


Creat Clearance w eGFR  21.06  


 


POC Glucometer   


 


Random Glucose  168 H  


 


Hemoglobin A1c %   9.4 H 


 


Calcium  8.1 L  


 


Magnesium    2.8 H


 


Total Bilirubin  0.5  


 


AST  20  


 


ALT  36  


 


Alkaline Phosphatase  114  


 


Creatine Kinase   


 


Troponin I   


 


Total Protein  5.0 L  


 


Albumin  2.6 L  


 


Vitamin B12  425  


 


TSH  0.61  














  10/30/18 10/30/18





  06:15 10:24


 


WBC  


 


RBC  


 


Hgb  


 


Hct  


 


MCV  


 


MCH  


 


MCHC  


 


RDW  


 


Plt Count  


 


MPV  


 


Absolute Neuts (auto)  


 


Neutrophils %  


 


Neutrophils % (Manual)  


 


Band Neutrophils %  


 


Lymphocytes %  


 


Lymphocytes % (Manual)  


 


Monocytes %  


 


Monocytes % (Manual)  


 


Eosinophils %  


 


Eosinophils % (Manual)  


 


Basophils %  


 


Basophils % (Manual)  


 


Nucleated RBC %  


 


Platelet Estimate  


 


Platelet Comment  


 


Anisocytosis  


 


PT with INR  


 


INR  


 


Sodium  


 


Potassium  


 


Chloride  


 


Carbon Dioxide  


 


Anion Gap  


 


BUN  


 


Creatinine  


 


Creat Clearance w eGFR  


 


POC Glucometer  172  74


 


Random Glucose  


 


Hemoglobin A1c %  


 


Calcium  


 


Magnesium  


 


Total Bilirubin  


 


AST  


 


ALT  


 


Alkaline Phosphatase  


 


Creatine Kinase  


 


Troponin I  


 


Total Protein  


 


Albumin  


 


Vitamin B12  


 


TSH  








S1 S2 RRR


Lungs decreased


ABd- soft, NT


Edema +








PLAN


dc iv fluids


noted episodes of tachycardia


Cardiology and Neurology eval noted 


for MRI brain 


monitor blood sugars


avoid hypoglycemia


restart lasix - monitor renal function 





Problem List





- Problems


(1) Hypoglycemia


Code(s): E16.2 - HYPOGLYCEMIA, UNSPECIFIED   





(2) New onset atrial fibrillation


Code(s): I48.91 - UNSPECIFIED ATRIAL FIBRILLATION   





(3) Syncope


Code(s): R55 - SYNCOPE AND COLLAPSE   


Qualifiers: 


   Syncope type: unspecified   Qualified Code(s): R55 - Syncope and collapse   





(4) Acute on chronic renal failure


Code(s): N17.9 - ACUTE KIDNEY FAILURE, UNSPECIFIED; N18.9 - CHRONIC KIDNEY 

DISEASE, UNSPECIFIED

## 2018-10-30 NOTE — CON.CARD
Cardiology Consult (text)





- Consultation


Consultation Note: 





- Consultation


Consultation Note: 


CC: syncope





hpi:  87 yo f with h/o HTN, hl, dchf vs venous insufficiency, DM, CKD, COPD, 

anemia, hyperkalemia,  gout, diverticulosis/gastritis/gib, who p/w syncope as 

per nursing home chart, reportedly did not wake up for 60 seconds.  Woke up 

feling confused, at baseline is alert.  In the ER noted to have afib, no prior 

history, as well as hypoglycemia.  





Sees Dr. Torrez for cardio





pmhx/pshx: per phi:  Appendectomy, Breast Biopsy, Cataract Removal, Colonoscopy 

(diverticular bleed 2016, right colon polyp removed remotely ), Hernia Repair, 

Hysterectomy, Oopherectomy, Tonsillectomy, Upper Endoscopy (2016 with 

esophageal candidiasis, gastritis and duodenitis)


social hx:   Never smoked


fam hx: no premature cad, scd


ros: per hpi; no nvd cough gib hematuria dysuria wt loss muscle pain





Ambulatory Orders


  Home Medications











 Medication  Instructions  Recorded


 


Acetaminophen 325 mg PO QID PRN 10/29/18


 


Allopurinol [Zyloprim -] 100 mg PO DAILY 10/29/18


 


Amlodipine Besylate [Norvasc -] 5 mg PO DAILY 10/29/18


 


Atorvastatin Ca [Lipitor] 20 mg PO HS 10/29/18


 


Ciprofloxacin [Cipro (Restricted 250 mg PO BID 10/29/18





To Id)]  


 


Furosemide [Lasix] 40 mg PO BID 10/29/18


 


Gabapentin [Neurontin -] 200 mg PO Q8H 10/29/18


 


Glipizide [Glucotrol -] 5 mg PO DAILY 10/29/18


 


Insulin (Novolog) [Novolog Vial] 0 units SQ ASDIR 10/29/18


 


Insulin Glargine,Hum.rec.anlog 16 unit SQ BID 10/29/18





[Lantus]  


 


Latanoprost/Pf [Latanoprost 0.005% 1 drop OU HS 10/29/18





Eye Drop]  


 


Pantoprazole Sodium [Protonix] 40 mg PO DAILY 10/29/18


 


Polyethylene Glycol 3350 [Gavilax] 17 gm PO DAILY 10/29/18


 


Prednisone 15 mg PO DAILY 10/29/18


 


predniSONE [Deltasone -] 10 mg PO HS 10/29/18











  Vital Signs











 Period  Temp  Pulse  Resp  BP Sys/Boothe  Pulse Ox


 


 Last 24 Hr  97.7 F-98.2 F  66-94  16-20  107-142/  











nad, calm


jvd flat, neck supple


ctab, nl effort


rrr nl s1, s2 no mrg


+ bs soft nt nd, obese


trace le edema.  no c/c


+ dp/pt, no carotid bruits


aaox3


no jaundice, diaphoresis








  Laboratory Last Values











WBC  7.8 K/mm3 (4.0-10.0)   10/30/18  05:30    


 


RBC  3.10 M/mm3 (3.60-5.2)  L  10/30/18  05:30    


 


Hgb  9.1 GM/dL (10.7-15.3)  L  10/30/18  05:30    


 


Hct  28.2 % (32.4-45.2)  L  10/30/18  05:30    


 


MCV  91.1 fl (80-96)   10/30/18  05:30    


 


MCH  29.3 pg (25.7-33.7)   10/30/18  05:30    


 


MCHC  32.1 g/dl (32.0-36.0)   10/30/18  05:30    


 


RDW  21.0 % (11.6-15.6)  H  10/30/18  05:30    


 


Plt Count  95 K/MM3 (134-434)  L D 10/30/18  05:30    


 


MPV  9.1 fl (7.5-11.1)   10/30/18  05:30    


 


Absolute Neuts (auto)  6.9 K/mm3 (1.5-8.0)   10/30/18  05:30    


 


Neutrophils %  87.9 % (42.8-82.8)  H  10/30/18  05:30    


 


Neutrophils % (Manual)  78.0 % (42.8-82.8)   10/29/18  15:15    


 


Band Neutrophils %  6.0 %  10/29/18  15:15    


 


Lymphocytes %  8.8 % (8-40)   10/30/18  05:30    


 


Lymphocytes % (Manual)  10.0 % (8-40)   10/29/18  15:15    


 


Monocytes %  3.1 % (3.8-10.2)  L  10/30/18  05:30    


 


Monocytes % (Manual)  6 % (3.8-10.2)   10/29/18  15:15    


 


Eosinophils %  0.0 % (0-4.5)   10/30/18  05:30    


 


Eosinophils % (Manual)  0.0 % (0-4.5)   10/29/18  15:15    


 


Basophils %  0.2 % (0-2.0)   10/30/18  05:30    


 


Basophils % (Manual)  0.0 % (0-2.0)   10/29/18  15:15    


 


Nucleated RBC %  0 % (0-0)   10/30/18  05:30    


 


Platelet Estimate  Slt decrease   10/29/18  15:15    


 


Platelet Comment  No clumping noted   10/29/18  15:15    


 


Anisocytosis  1+   10/29/18  15:15    


 


PT with INR  11.20 SEC (9.7-13.0)   10/29/18  15:15    


 


INR  0.95  (0.83-1.09)   10/29/18  15:15    


 


Sodium  139 mmol/L (136-145)   10/30/18  05:30    


 


Potassium  3.4 mmol/L (3.5-5.1)  L  10/30/18  05:30    


 


Chloride  97 mmol/L ()  L  10/30/18  05:30    


 


Carbon Dioxide  28 mmol/L (21-32)   10/30/18  05:30    


 


Anion Gap  14 MMOL/L (8-16)   10/30/18  05:30    


 


BUN  78 mg/dL (7-18)  H  10/30/18  05:30    


 


Creatinine  2.2 mg/dL (0.55-1.3)  H  10/30/18  05:30    


 


Creat Clearance w eGFR  21.06  (>60)   10/30/18  05:30    


 


POC Glucometer  74 UNITS ()   10/30/18  10:24    


 


Random Glucose  168 mg/dL ()  H  10/30/18  05:30    


 


Hemoglobin A1c %  9.4 % (4.2-6.3)  H  10/30/18  05:30    


 


Calcium  8.1 mg/dL (8.5-10.1)  L  10/30/18  05:30    


 


Magnesium  2.8 mg/dL (1.8-2.4)  H  10/30/18  05:30    


 


Total Bilirubin  0.5 mg/dL (0.2-1)   10/30/18  05:30    


 


AST  20 U/L (15-37)   10/30/18  05:30    


 


ALT  36 U/L (13-61)   10/30/18  05:30    


 


Alkaline Phosphatase  114 U/L ()   10/30/18  05:30    


 


Creatine Kinase  54 IU/L ()   10/29/18  15:15    


 


Troponin I  0.15 ng/ml (0.00-0.05)  H  10/29/18  21:50    


 


Total Protein  5.0 g/dl (6.4-8.2)  L  10/30/18  05:30    


 


Albumin  2.6 g/dl (3.4-5.0)  L  10/30/18  05:30    


 


Vitamin B12  425 pg/ml (193-986)   10/30/18  05:30    


 


TSH  0.61 uIU/ml (0.358-3.74)   10/30/18  05:30    











 


ekg:  sr, pacs, pvcs, rbbb





cxr: no congestion/infiltrates





tele:  sr, occ pvcs





head ct: wnl





echo 12/2017 :nl lv/rv size/fn.  impaired relaxation.  1+ lae. nl valves.  nl 

ivc.  no rvsp. 





holter 12/2016: avg HR 56 bpm.  no bradyarrhythmias.  no sig pauses.  1% pvc 

burden.  one run of psvt (4 beats).  





stress test 2/2018: No Ekg changes.  No ischemia.  nl perf/EF. 


stress test 2014: no ekg changes, sx's. bline hr 52 bpm.  nl perfusion/EF. 





echo 9/2018:  nl lv/rv, no sig valve path





EKG: afib, rate 108





tele: afib 100s-120s








A/P: 87 yo f with h/o HTN, hl, dchf vs venous insufficiency, DM, CKD, COPD, 

anemia, hyperkalemia,  gout, diverticulosis/gastritis/gib, who p/w syncope. 





syncope


- may be in setting of hypoglycemia


- neuro following, MRI of brain ordered


- patient unable to give history, doesn't remember event


- monitoring on tele, afib with rates 100-120s, no pauses





Afib


- no prior hx


- rates 110s-120s, start metoprolol 25 mg TID, prn IV metoprolol


- [id number] score 6, anticoagulation indicated. low platelets noted although 

improved from prior admission.  would start eliquis 2.5 mg BID if no 

contraindication (age >80, Cr >1.5) if H/H, platelets stable and pending PT eval





elevated trops:


-borderline trop elevation 0.17->0.11  flat trend, nl ck, and less than prior 

baseline values-->not consistent with acs





htn


- cont norvasc 





venous insufficiency vs chronic diastolic hf


- stable Cr, h/o CKD


- was on furosemide 40 mg BID prior, restarted furosemide 40 mg daily. monitor 

volume status





hld:


-cont statin





ckd


- cr stable








DM, hypoglycemia


- manage per primary team

## 2018-10-30 NOTE — EKG
Test Reason : 

Blood Pressure : ***/*** mmHG

Vent. Rate : 108 BPM     Atrial Rate : 159 BPM

   P-R Int : 000 ms          QRS Dur : 088 ms

    QT Int : 324 ms       P-R-T Axes : 000 -57 148 degrees

   QTc Int : 434 ms

 

ATRIAL FIBRILLATION WITH RAPID VENTRICULAR RESPONSE

LEFT ANTERIOR FASCICULAR BLOCK

T WAVE ABNORMALITY, CONSIDER LATERAL ISCHEMIA

ABNORMAL ECG

WHEN COMPARED WITH ECG OF 02-OCT-2018 12:22,

ATRIAL FIBRILLATION HAS REPLACED SINUS RHYTHM

VENT. RATE HAS INCREASED BY  36 BPM

T WAVE INVERSION LESS EVIDENT IN LATERAL LEADS

Confirmed by MD ERIN, BETTIE ([id number]) on 10/30/2018 4:16:27 PM

 

Referred By:             Confirmed By:BETTIE KHAN MD

## 2018-10-31 LAB
ALBUMIN SERPL-MCNC: 2.5 G/DL (ref 3.4–5)
ALP SERPL-CCNC: 123 U/L (ref 45–117)
ALT SERPL-CCNC: 36 U/L (ref 13–61)
ANION GAP SERPL CALC-SCNC: 12 MMOL/L (ref 8–16)
AST SERPL-CCNC: 22 U/L (ref 15–37)
BILIRUB SERPL-MCNC: 0.5 MG/DL (ref 0.2–1)
BUN SERPL-MCNC: 94 MG/DL (ref 7–18)
CALCIUM SERPL-MCNC: 8 MG/DL (ref 8.5–10.1)
CHLORIDE SERPL-SCNC: 98 MMOL/L (ref 98–107)
CO2 SERPL-SCNC: 29 MMOL/L (ref 21–32)
CREAT SERPL-MCNC: 2.7 MG/DL (ref 0.55–1.3)
DEPRECATED RDW RBC AUTO: 20.9 % (ref 11.6–15.6)
GLUCOSE SERPL-MCNC: 150 MG/DL (ref 74–106)
HCT VFR BLD CALC: 28.6 % (ref 32.4–45.2)
HGB BLD-MCNC: 9 GM/DL (ref 10.7–15.3)
MCH RBC QN AUTO: 29.3 PG (ref 25.7–33.7)
MCHC RBC AUTO-ENTMCNC: 31.6 G/DL (ref 32–36)
MCV RBC: 92.6 FL (ref 80–96)
PLATELET # BLD AUTO: 100 K/MM3 (ref 134–434)
PMV BLD: 9.1 FL (ref 7.5–11.1)
POTASSIUM SERPLBLD-SCNC: 4 MMOL/L (ref 3.5–5.1)
PROT SERPL-MCNC: 5 G/DL (ref 6.4–8.2)
RBC # BLD AUTO: 3.08 M/MM3 (ref 3.6–5.2)
SODIUM SERPL-SCNC: 140 MMOL/L (ref 136–145)
WBC # BLD AUTO: 7.4 K/MM3 (ref 4–10)

## 2018-10-31 RX ADMIN — ATORVASTATIN CALCIUM SCH MG: 20 TABLET, FILM COATED ORAL at 21:27

## 2018-10-31 RX ADMIN — GABAPENTIN SCH MG: 100 CAPSULE ORAL at 14:31

## 2018-10-31 RX ADMIN — METOPROLOL TARTRATE SCH: 25 TABLET, FILM COATED ORAL at 21:27

## 2018-10-31 RX ADMIN — METOPROLOL TARTRATE SCH: 25 TABLET, FILM COATED ORAL at 06:12

## 2018-10-31 RX ADMIN — METOPROLOL TARTRATE SCH MG: 25 TABLET, FILM COATED ORAL at 14:31

## 2018-10-31 RX ADMIN — PANTOPRAZOLE SODIUM SCH MG: 40 TABLET, DELAYED RELEASE ORAL at 10:01

## 2018-10-31 RX ADMIN — POLYETHYLENE GLYCOL 3350 SCH GM: 17 POWDER, FOR SOLUTION ORAL at 10:02

## 2018-10-31 RX ADMIN — PREDNISONE SCH MG: 10 TABLET ORAL at 21:26

## 2018-10-31 RX ADMIN — INSULIN ASPART SCH: 100 INJECTION, SOLUTION INTRAVENOUS; SUBCUTANEOUS at 01:02

## 2018-10-31 RX ADMIN — FUROSEMIDE SCH MG: 40 TABLET ORAL at 10:01

## 2018-10-31 RX ADMIN — INSULIN ASPART SCH UNITS: 100 INJECTION, SOLUTION INTRAVENOUS; SUBCUTANEOUS at 18:01

## 2018-10-31 RX ADMIN — INSULIN ASPART SCH UNITS: 100 INJECTION, SOLUTION INTRAVENOUS; SUBCUTANEOUS at 13:10

## 2018-10-31 RX ADMIN — GABAPENTIN SCH MG: 100 CAPSULE ORAL at 06:12

## 2018-10-31 RX ADMIN — AMLODIPINE BESYLATE SCH MG: 5 TABLET ORAL at 10:02

## 2018-10-31 RX ADMIN — INSULIN ASPART SCH: 100 INJECTION, SOLUTION INTRAVENOUS; SUBCUTANEOUS at 06:12

## 2018-10-31 RX ADMIN — GABAPENTIN SCH MG: 100 CAPSULE ORAL at 21:26

## 2018-10-31 RX ADMIN — ALLOPURINOL SCH MG: 100 TABLET ORAL at 10:02

## 2018-10-31 RX ADMIN — LATANOPROST SCH DROP: 50 SOLUTION OPHTHALMIC at 21:32

## 2018-10-31 NOTE — PN
Progress Note (short form)





- Note


Progress Note: 





H 88 year old female history of DM, HLD, CKD, HTN, Temporal arteritis. Patient 

is on tapering dose of steroid. Patient lives in NH /Rehab. Milagros has episode 

of LOC lasting for sixty secons. She has no seizure like activity . According 

to chart Sugar was 60, ct head is normal. She has new onset atrial fbrillation. 

According to son, she has been weak and mentally not very sharp recently for 

past two months. 


She is admitted to [facility] for syncope work up. There is no new complain, no 

seizure , no LOC. She denies any headhace. She has been more alert 

today.Patient was seen earlier today








Neurological Examination


Alert oriented x 3, follow command





eomi, pupils reactive, no face asymmetry


motor 5/5 all extremity


sensation is noraml, reflexa re generalized diminished





ct head unremarkable


mri of brain pending


Vitamin b12,folate tsh is pending








1. Most likley to be syncope, less likely to be stroke or seizure. MRI OF BRAIN 

is pending


2.  Cognitive difficulty seems to be related to metabolic encphalopathy 

including steroid use and renal failure and possible dementia. There is no 

evidence of meningitis or stroke





Will follow up on mri of brain and b12,folate tsh results





Continue supportive care and PT





Thanking you so much


Kalpesh Villareal

## 2018-10-31 NOTE — PN
Progress Note (short form)





- Note


Progress Note: 





events noted


pt feels well


niece at bedside


No SOB 


denies any complaints


 Vital Signs - 24 hr











  10/30/18 10/30/18 10/30/18





  14:00 15:26 17:00


 


Temperature 97.9 F  98.3 F


 


Pulse Rate 117 H 120 H 93 H


 


Respiratory   20





Rate   


 


Blood Pressure 115/76 118/62 98/59 L


 


O2 Sat by Pulse   





Oximetry (%)   














  10/30/18 10/31/18 10/31/18





  21:00 02:04 06:00


 


Temperature 97.1 F L 98.4 F 97.6 F


 


Pulse Rate 102 H 85 88


 


Respiratory 20 20 20





Rate   


 


Blood Pressure 131/60 105/59 L 91/52 L


 


O2 Sat by Pulse   





Oximetry (%)   














  10/31/18 10/31/18





  09:13 10:37


 


Temperature 98.1 F 


 


Pulse Rate 95 H 


 


Respiratory 17 





Rate  


 


Blood Pressure 98/53 L 


 


O2 Sat by Pulse  97





Oximetry (%)  








 Current Medications











Generic Name Dose Route Start Last Admin





  Trade Name Freq  PRN Reason Stop Dose Admin


 


Acetaminophen  325 mg  10/29/18 17:32  





  Tylenol -  PO   





  Q6H PRN   





  PAIN   





     





     





     


 


Allopurinol  100 mg  10/30/18 10:00  10/31/18 10:02





  Zyloprim -  PO   100 mg





  DAILY ELLA   Administration





     





     





     





     


 


Amlodipine Besylate  5 mg  10/30/18 10:00  10/31/18 10:02





  Norvasc -  PO   5 mg





  DAILY ELLA   Administration





     





     





     





     


 


Atorvastatin Calcium  20 mg  10/29/18 22:00  10/30/18 22:00





  Lipitor -  PO   20 mg





  HS ELLA   Administration





     





     





     





     


 


Furosemide  40 mg  10/31/18 10:00  10/31/18 10:01





  Lasix -  PO   40 mg





  DAILY ELLA   Administration





     





     





     





     


 


Gabapentin  200 mg  10/29/18 22:00  10/31/18 06:12





  Neurontin -  PO   200 mg





  TID ELLA   Administration





     





     





     





     


 


Insulin Aspart  1 vial  10/29/18 18:45  10/31/18 06:12





  Novolog Vial Sliding Scale -  SQ   Not Given





  Q6H [facility]   





     





     





  Protocol   





     


 


Latanoprost  1 drop  10/29/18 22:00  10/30/18 22:18





  Xalatan 0.005% Eye Drops -  OU   1 drop





  HS ELLA   Administration





     





     





     





     


 


Metoprolol Tartrate  25 mg  10/30/18 14:30  10/31/18 06:12





  Lopressor -  PO   Not Given





  TID ELLA   





     





     





     





     


 


Metoprolol Tartrate  5 mg  10/30/18 14:17  10/30/18 15:26





  Lopressor Injection -  IVPUSH   5 mg





  Q4H PRN   Administration





  HR>130 bpm   





     





     





     


 


Pantoprazole Sodium  40 mg  10/30/18 10:00  10/31/18 10:01





  Protonix -  PO   40 mg





  DAILY ELLA   Administration





     





     





     





     


 


Polyethylene Glycol  17 gm  10/30/18 10:00  10/31/18 10:02





  Miralax (For Daily Use) -  PO   17 gm





  DAILY ELLA   Administration





     





     





     





     


 


Prednisone 10 mg/ Prednisone 5  15 mg  10/29/18 22:00  10/30/18 22:00





  mg  PO   15 mg





  [facility]   Administration





     





     





     





     








 Laboratory Results - last 24 hr











  10/30/18 10/30/18 10/31/18





  17:29 21:54 06:09


 


WBC   


 


RBC   


 


Hgb   


 


Hct   


 


MCV   


 


MCH   


 


MCHC   


 


RDW   


 


Plt Count   


 


MPV   


 


Sodium   


 


Potassium   


 


Chloride   


 


Carbon Dioxide   


 


Anion Gap   


 


BUN   


 


Creatinine   


 


Creat Clearance w eGFR   


 


POC Glucometer  374  134  163


 


Random Glucose   


 


Calcium   


 


Total Bilirubin   


 


AST   


 


ALT   


 


Alkaline Phosphatase   


 


Total Protein   


 


Albumin   














  10/31/18 10/31/18





  06:30 06:30


 


WBC  7.4 


 


RBC  3.08 L 


 


Hgb  9.0 L 


 


Hct  28.6 L 


 


MCV  92.6 


 


MCH  29.3 


 


MCHC  31.6 L 


 


RDW  20.9 H 


 


Plt Count  100 L 


 


MPV  9.1 


 


Sodium   140


 


Potassium   4.0


 


Chloride   98


 


Carbon Dioxide   29


 


Anion Gap   12


 


BUN   94 H


 


Creatinine   2.7 H


 


Creat Clearance w eGFR   16.63


 


POC Glucometer  


 


Random Glucose   150 H


 


Calcium   8.0 L


 


Total Bilirubin   0.5


 


AST   22


 


ALT   36


 


Alkaline Phosphatase   123 H


 


Total Protein   5.0 L


 


Albumin   2.5 L











S1 S2 RRR


Lungs decreased, no rales 


ABd- soft, NT


Edema + decreased








PLAN


restarted Lasix daily-- was on BID at [facility]


Does not appear to be in volume overload


monitor renal function 


noted episodes of tachycardia


Cardiology and Neurology eval noted 


for MRI brain 


monitor blood sugars


avoid hypoglycemia








Problem List





- Problems


(1) Hypoglycemia


Code(s): E16.2 - HYPOGLYCEMIA, UNSPECIFIED   





(2) New onset atrial fibrillation


Code(s): I48.91 - UNSPECIFIED ATRIAL FIBRILLATION   





(3) Syncope


Code(s): R55 - SYNCOPE AND COLLAPSE   


Qualifiers: 


   Syncope type: unspecified   Qualified Code(s): R55 - Syncope and collapse   





(4) Acute on chronic renal failure


Code(s): N17.9 - ACUTE KIDNEY FAILURE, UNSPECIFIED; N18.9 - CHRONIC KIDNEY 

DISEASE, UNSPECIFIED

## 2018-11-01 LAB
ANION GAP SERPL CALC-SCNC: 12 MMOL/L (ref 8–16)
BUN SERPL-MCNC: 101 MG/DL (ref 7–18)
CALCIUM SERPL-MCNC: 8 MG/DL (ref 8.5–10.1)
CHLORIDE SERPL-SCNC: 95 MMOL/L (ref 98–107)
CO2 SERPL-SCNC: 28 MMOL/L (ref 21–32)
CREAT SERPL-MCNC: 2.8 MG/DL (ref 0.55–1.3)
GLUCOSE SERPL-MCNC: 243 MG/DL (ref 74–106)
POTASSIUM SERPLBLD-SCNC: 4.5 MMOL/L (ref 3.5–5.1)
SODIUM SERPL-SCNC: 135 MMOL/L (ref 136–145)

## 2018-11-01 RX ADMIN — ALLOPURINOL SCH MG: 100 TABLET ORAL at 09:20

## 2018-11-01 RX ADMIN — METOPROLOL TARTRATE SCH MG: 25 TABLET, FILM COATED ORAL at 13:57

## 2018-11-01 RX ADMIN — METOPROLOL TARTRATE SCH MG: 25 TABLET, FILM COATED ORAL at 22:23

## 2018-11-01 RX ADMIN — LATANOPROST SCH DROP: 50 SOLUTION OPHTHALMIC at 22:23

## 2018-11-01 RX ADMIN — INSULIN ASPART SCH UNITS: 100 INJECTION, SOLUTION INTRAVENOUS; SUBCUTANEOUS at 18:19

## 2018-11-01 RX ADMIN — GABAPENTIN SCH MG: 100 CAPSULE ORAL at 06:26

## 2018-11-01 RX ADMIN — PREDNISONE SCH MG: 10 TABLET ORAL at 22:23

## 2018-11-01 RX ADMIN — INSULIN ASPART SCH: 100 INJECTION, SOLUTION INTRAVENOUS; SUBCUTANEOUS at 13:00

## 2018-11-01 RX ADMIN — ATORVASTATIN CALCIUM SCH MG: 20 TABLET, FILM COATED ORAL at 22:22

## 2018-11-01 RX ADMIN — PANTOPRAZOLE SODIUM SCH MG: 40 TABLET, DELAYED RELEASE ORAL at 09:20

## 2018-11-01 RX ADMIN — INSULIN ASPART SCH UNITS: 100 INJECTION, SOLUTION INTRAVENOUS; SUBCUTANEOUS at 06:25

## 2018-11-01 RX ADMIN — FUROSEMIDE SCH MG: 40 TABLET ORAL at 09:19

## 2018-11-01 RX ADMIN — GABAPENTIN SCH MG: 100 CAPSULE ORAL at 13:56

## 2018-11-01 RX ADMIN — INSULIN ASPART SCH: 100 INJECTION, SOLUTION INTRAVENOUS; SUBCUTANEOUS at 00:00

## 2018-11-01 RX ADMIN — AMLODIPINE BESYLATE SCH MG: 5 TABLET ORAL at 09:19

## 2018-11-01 RX ADMIN — POLYETHYLENE GLYCOL 3350 SCH GM: 17 POWDER, FOR SOLUTION ORAL at 09:30

## 2018-11-01 RX ADMIN — GABAPENTIN SCH MG: 100 CAPSULE ORAL at 22:23

## 2018-11-01 RX ADMIN — METOPROLOL TARTRATE SCH MG: 25 TABLET, FILM COATED ORAL at 06:26

## 2018-11-01 NOTE — PN
Progress Note (short form)





- Note


Progress Note: 


CC: syncope





s: no chest pain, palps, dizzy, lightheadedness





  


  Current Medications











Generic Name Dose Route Start Last Admin





  Trade Name Freq  PRN Reason Stop Dose Admin


 


Acetaminophen  325 mg  10/29/18 17:32  





  Tylenol -  PO   





  Q6H PRN   





  PAIN   





     





     





     


 


Allopurinol  100 mg  10/30/18 10:00  11/01/18 09:20





  Zyloprim -  PO   100 mg





  DAILY ELLA   Administration





     





     





     





     


 


Amlodipine Besylate  5 mg  10/30/18 10:00  11/01/18 09:19





  Norvasc -  PO   5 mg





  DAILY ELLA   Administration





     





     





     





     


 


Atorvastatin Calcium  20 mg  10/29/18 22:00  10/31/18 21:27





  Lipitor -  PO   20 mg





  HS ELLA   Administration





     





     





     





     


 


Gabapentin  200 mg  10/29/18 22:00  11/01/18 06:26





  Neurontin -  PO   200 mg





  TID ELLA   Administration





     





     





     





     


 


Insulin Aspart  1 vial  10/29/18 18:45  11/01/18 06:25





  Novolog Vial Sliding Scale -  SQ   12 units





  Q6H ELLA   Administration





     





     





  Protocol   





     


 


Latanoprost  1 drop  10/29/18 22:00  10/31/18 21:32





  Xalatan 0.005% Eye Drops -  OU   1 drop





  HS ELLA   Administration





     





     





     





     


 


Metoprolol Tartrate  25 mg  10/30/18 14:30  11/01/18 06:26





  Lopressor -  PO   25 mg





  TID ELLA   Administration





     





     





     





     


 


Metoprolol Tartrate  5 mg  10/30/18 14:17  10/30/18 15:26





  Lopressor Injection -  IVPUSH   5 mg





  Q4H PRN   Administration





  HR>130 bpm   





     





     





     


 


Pantoprazole Sodium  40 mg  10/30/18 10:00  11/01/18 09:20





  Protonix -  PO   40 mg





  DAILY ELLA   Administration





     





     





     





     


 


Polyethylene Glycol  17 gm  10/30/18 10:00  11/01/18 09:30





  Miralax (For Daily Use) -  PO   17 gm





  DAILY ELLA   Administration





     





     





     





     


 


Prednisone 10 mg/ Prednisone 5  15 mg  10/29/18 22:00  10/31/18 21:26





  mg  PO   15 mg





  HS ELLA   Administration





     





     





     





     








 Vital Signs











 Period  Temp  Pulse  Resp  BP Sys/Boothe  Pulse Ox


 


 Last 24 Hr  97.9 F-99.2 F  102-125  18-20  /54-69  97-97











nad, calm


jvd flat, neck supple


ctab, nl effort


rrr nl s1, s2 no mrg


+ bs soft nt nd, obese


no le edema.  no c/c


no jaundice, diaphoresis





 CBC, BMP





 10/31/18 06:30 





 11/01/18 05:30 








 


ekg:  sr, pacs, pvcs, rbbb





cxr: no congestion/infiltrates





tele:  afib, rate ok





head ct: wnl





echo 12/2017 :nl lv/rv size/fn.  impaired relaxation.  1+ lae. nl valves.  nl 

ivc.  no rvsp. 





holter 12/2016: avg HR 56 bpm.  no bradyarrhythmias.  no sig pauses.  1% pvc 

burden.  one run of psvt (4 beats).  





stress test 2/2018: No Ekg changes.  No ischemia.  nl perf/EF. 


stress test 2014: no ekg changes, sx's. bline hr 52 bpm.  nl perfusion/EF. 





echo 9/2018:  nl lv/rv, no sig valve path





EKG: afib, rate 108





tele: afib 80s-100s








A/P: 89 yo f with h/o HTN, hl, dchf vs venous insufficiency, DM, CKD, COPD, 

anemia, hyperkalemia,  gout, diverticulosis/gastritis/gib, who p/w syncope. 





syncope


- may be in setting of hypoglycemia


- neuro following, MRI of brain unremarkable


- patient unable to give history, doesn't remember event


- monitoring on tele, afib no pauses





Afib


- no prior hx


- rates 110s-120s, start metoprolol 25 mg TID, prn IV metoprolol


- [id number] score 6, anticoagulation indicated. low platelets noted although 

improved from prior admission. 


- discussed with patient's son, given history of significant rectal bleeding, 

anemia, low platelets as well as fall risk will defer anticoagulation.  

Understands risk of stroke





elevated trops:


-borderline trop elevation 0.17->0.11  flat trend, nl ck, and less than prior 

baseline values-->not consistent with acs





htn


- cont norvasc 





venous insufficiency vs chronic diastolic hf


- bun/Cr rising, h/o CKD


- was on furosemide 40 mg BID prior, restarted furosemide 40 mg daily but bun/

cr up again, will hold lasix for now. monitor volume status, labs





hld:


-cont statin





diana on ckd


- as above





DM, hypoglycemia


- manage per primary team

## 2018-11-01 NOTE — PN
Progress Note (short form)





- Note


Progress Note: 





events noted


pt feels well


No SOB 


denies any complaints


  Vital Signs - 24 hr











  10/31/18 10/31/18 11/01/18





  17:00 21:00 02:02


 


Temperature 99.2 F 98.2 F 98.5 F


 


Pulse Rate 108 H 112 H 124 H


 


Respiratory 20 20 20





Rate   


 


Blood Pressure 133/69 93/63 100/63


 


O2 Sat by Pulse  97 





Oximetry (%)   














  11/01/18 11/01/18 11/01/18





  05:35 09:00 10:00


 


Temperature 97.9 F  97.9 F


 


Pulse Rate 102 H  67


 


Respiratory 20  20





Rate   


 


Blood Pressure 118/62  98/62


 


O2 Sat by Pulse  97 





Oximetry (%)   








 Current Medications











Generic Name Dose Route Start Last Admin





  Trade Name Freq  PRN Reason Stop Dose Admin


 


Acetaminophen  325 mg  10/29/18 17:32  





  Tylenol -  PO   





  Q6H PRN   





  PAIN   





     





     





     


 


Allopurinol  100 mg  10/30/18 10:00  11/01/18 09:20





  Zyloprim -  PO   100 mg





  DAILY ELLA   Administration





     





     





     





     


 


Amlodipine Besylate  5 mg  10/30/18 10:00  11/01/18 09:19





  Norvasc -  PO   5 mg





  DAILY ELLA   Administration





     





     





     





     


 


Atorvastatin Calcium  20 mg  10/29/18 22:00  10/31/18 21:27





  Lipitor -  PO   20 mg





  HS ELLA   Administration





     





     





     





     


 


Gabapentin  200 mg  10/29/18 22:00  11/01/18 06:26





  Neurontin -  PO   200 mg





  TID ELLA   Administration





     





     





     





     


 


Insulin Aspart  1 vial  10/29/18 18:45  11/01/18 06:25





  Novolog Vial Sliding Scale -  SQ   12 units





  Q6H ELLA   Administration





     





     





  Protocol   





     


 


Latanoprost  1 drop  10/29/18 22:00  10/31/18 21:32





  Xalatan 0.005% Eye Drops -  OU   1 drop





  HS ELLA   Administration





     





     





     





     


 


Metoprolol Tartrate  25 mg  10/30/18 14:30  11/01/18 06:26





  Lopressor -  PO   25 mg





  TID ELLA   Administration





     





     





     





     


 


Metoprolol Tartrate  5 mg  10/30/18 14:17  10/30/18 15:26





  Lopressor Injection -  IVPUSH   5 mg





  Q4H PRN   Administration





  HR>130 bpm   





     





     





     


 


Pantoprazole Sodium  40 mg  10/30/18 10:00  11/01/18 09:20





  Protonix -  PO   40 mg





  DAILY ELLA   Administration





     





     





     





     


 


Polyethylene Glycol  17 gm  10/30/18 10:00  11/01/18 09:30





  Miralax (For Daily Use) -  PO   17 gm





  DAILY ELLA   Administration





     





     





     





     


 


Prednisone 10 mg/ Prednisone 5  15 mg  10/29/18 22:00  10/31/18 21:26





  mg  PO   15 mg





  HS ELLA   Administration





     





     





     





     








 Laboratory Results - last 24 hr











  10/30/18 10/31/18 10/31/18





  05:30 11:57 16:39


 


Hct  27.2 L  


 


Sodium   


 


Potassium   


 


Chloride   


 


Carbon Dioxide   


 


Anion Gap   


 


BUN   


 


Creatinine   


 


Creat Clearance w eGFR   


 


POC Glucometer   > 600  209


 


Random Glucose   


 


Calcium   


 


Folate  1539  


 


Folate Hemolysate  418.6  














  10/31/18 11/01/18 11/01/18





  21:04 05:30 06:13


 


Hct   


 


Sodium   135 L 


 


Potassium   4.5 


 


Chloride   95 L 


 


Carbon Dioxide   28 


 


Anion Gap   12 


 


BUN   101 H 


 


Creatinine   2.8 H 


 


Creat Clearance w eGFR   15.95 


 


POC Glucometer  151   379


 


Random Glucose   243 H 


 


Calcium   8.0 L 


 


Folate   


 


Folate Hemolysate   














  11/01/18





  11:57


 


Hct 


 


Sodium 


 


Potassium 


 


Chloride 


 


Carbon Dioxide 


 


Anion Gap 


 


BUN 


 


Creatinine 


 


Creat Clearance w eGFR 


 


POC Glucometer  144


 


Random Glucose 


 


Calcium 


 


Folate 


 


Folate Hemolysate 











S1 S2 RRR


Lungs decreased, no rales 


ABd- soft, NT


Edema + decreased








PLAN


dc Lasix-- worsening renal function


Does not appear to be in volume overload


noted episodes of tachycardia


Cardiology and Neurology eval noted 


MRI brain - no acute abnormality 


monitor blood sugars


avoid hypoglycemia








Problem List





- Problems


(1) Hypoglycemia


Code(s): E16.2 - HYPOGLYCEMIA, UNSPECIFIED   





(2) New onset atrial fibrillation


Code(s): I48.91 - UNSPECIFIED ATRIAL FIBRILLATION   





(3) Syncope


Code(s): R55 - SYNCOPE AND COLLAPSE   


Qualifiers: 


   Syncope type: unspecified   Qualified Code(s): R55 - Syncope and collapse   





(4) Acute on chronic renal failure


Code(s): N17.9 - ACUTE KIDNEY FAILURE, UNSPECIFIED; N18.9 - CHRONIC KIDNEY 

DISEASE, UNSPECIFIED

## 2018-11-01 NOTE — PN
Progress Note (short form)





- Note


Progress Note: 








 88 year old female history of DM, HLD, CKD, HTN, Temporal arteritis. Patient 

is on tapering dose of steroid. Patient lives in NH /Rehab. Milagros has episode 

of LOC lasting for sixty secons. She has no seizure like activity . According 

to chart Sugar was 60, ct head is normal. She has new onset atrial fbrillation. 

According to son, she has been weak and mentally not very sharp recently for 

past two months. 


She is admitted to [facility] for syncope work up. There is no new complain, no 

seizure , no LOC. She denies any headhace. She has been more alert 

today.Patient was seen earlier today








Neurological Examination


Alert oriented x 2, follow command, could not tell date exactly 





eomi, pupils reactive, no face asymmetry


motor 5/5 all extremity


sensation is noraml, reflexa re generalized diminished





ct head unremarkable


mri of brain is unremarkable 








1. Most likley to be syncope, less likely to be stroke or seizure. mri of brain 

is unremarkable.


2.  Cognitive difficulty seems to be related to metabolic encphalopathy 

including steroid use and renal failure and possible dementia. There is no 

evidence of meningitis or stroke





will continue to follow up with primary 





Continue supportive care and PT





Thanking you so much


Kalpesh Villareal

## 2018-11-02 LAB
ANION GAP SERPL CALC-SCNC: 12 MMOL/L (ref 8–16)
BUN SERPL-MCNC: 105 MG/DL (ref 7–18)
CALCIUM SERPL-MCNC: 8.5 MG/DL (ref 8.5–10.1)
CHLORIDE SERPL-SCNC: 95 MMOL/L (ref 98–107)
CO2 SERPL-SCNC: 28 MMOL/L (ref 21–32)
CREAT SERPL-MCNC: 2.7 MG/DL (ref 0.55–1.3)
GLUCOSE SERPL-MCNC: 244 MG/DL (ref 74–106)
POTASSIUM SERPLBLD-SCNC: 4.7 MMOL/L (ref 3.5–5.1)
SODIUM SERPL-SCNC: 135 MMOL/L (ref 136–145)

## 2018-11-02 RX ADMIN — INSULIN ASPART SCH UNITS: 100 INJECTION, SOLUTION INTRAVENOUS; SUBCUTANEOUS at 12:24

## 2018-11-02 RX ADMIN — INSULIN ASPART SCH: 100 INJECTION, SOLUTION INTRAVENOUS; SUBCUTANEOUS at 01:57

## 2018-11-02 RX ADMIN — GABAPENTIN SCH MG: 100 CAPSULE ORAL at 21:26

## 2018-11-02 RX ADMIN — ALLOPURINOL SCH MG: 100 TABLET ORAL at 10:34

## 2018-11-02 RX ADMIN — PANTOPRAZOLE SODIUM SCH MG: 40 TABLET, DELAYED RELEASE ORAL at 10:34

## 2018-11-02 RX ADMIN — LATANOPROST SCH DROP: 50 SOLUTION OPHTHALMIC at 21:27

## 2018-11-02 RX ADMIN — GABAPENTIN SCH MG: 100 CAPSULE ORAL at 06:10

## 2018-11-02 RX ADMIN — POLYETHYLENE GLYCOL 3350 SCH GM: 17 POWDER, FOR SOLUTION ORAL at 11:00

## 2018-11-02 RX ADMIN — ATORVASTATIN CALCIUM SCH MG: 20 TABLET, FILM COATED ORAL at 21:27

## 2018-11-02 RX ADMIN — INSULIN ASPART SCH UNITS: 100 INJECTION, SOLUTION INTRAVENOUS; SUBCUTANEOUS at 06:10

## 2018-11-02 RX ADMIN — INSULIN DETEMIR SCH UNITS: 100 INJECTION, SOLUTION SUBCUTANEOUS at 22:23

## 2018-11-02 RX ADMIN — METOPROLOL TARTRATE SCH MG: 25 TABLET, FILM COATED ORAL at 06:10

## 2018-11-02 RX ADMIN — GABAPENTIN SCH MG: 100 CAPSULE ORAL at 14:16

## 2018-11-02 RX ADMIN — PREDNISONE SCH MG: 10 TABLET ORAL at 21:26

## 2018-11-02 RX ADMIN — INSULIN ASPART SCH UNITS: 100 INJECTION, SOLUTION INTRAVENOUS; SUBCUTANEOUS at 17:59

## 2018-11-02 NOTE — PN
Progress Note (short form)





- Note


Progress Note: 


patient seen and examined today.


Chart reviewed.


Awake and comfortable


Still tachycardic


Denies chest pain


Denies abdominal pain


Blood sugar running high--- medications reviewed--- 


 Vital Signs











Temp  97.4 F L  11/02/18 05:00


 


Pulse  108 H  11/02/18 05:00


 


Resp  20   11/02/18 05:00


 


BP  124/72   11/02/18 05:00


 


Pulse Ox  98   11/01/18 20:10








 Intake & Output











 11/01/18 11/01/18 11/02/18





 11:59 23:59 11:59


 


Intake Total 50 910 100


 


Balance 50 910 100


 


Intake:   


 


  IV  10 


 


    saline lock  10 


 


  Oral 50 900 100


 


Other:   


 


  Voiding Method Incontinent Incontinent 


 


  # Unmeasured Voids   


 


    Void  2 1


 


  Bowel Movement  Yes: Large Soft 


 


  # Bowel Movements  1 








Active Medications





Acetaminophen (Tylenol -)  325 mg PO Q6H PRN


   PRN Reason: PAIN


Allopurinol (Zyloprim -)  100 mg PO DAILY [facility]


   Last Admin: 11/02/18 10:34 Dose:  100 mg


Atorvastatin Calcium (Lipitor -)  20 mg PO [facility]


   Last Admin: 11/01/18 22:22 Dose:  20 mg


Gabapentin (Neurontin -)  200 mg PO TID [facility]


   Last Admin: 11/02/18 06:10 Dose:  200 mg


Insulin Aspart (Novolog Vial Sliding Scale -)  1 vial SQ Q6H [facility]; Protocol


   Last Admin: 11/02/18 06:10 Dose:  8 units


Insulin Detemir (Levemir Vial)  15 units SQ [facility]


Latanoprost (Xalatan 0.005% Eye Drops -)  1 drop OU [facility]


   Last Admin: 11/01/18 22:23 Dose:  1 drop


Metoprolol Succinate (Toprol Xl -)  50 mg PO BID [facility]


   Last Admin: 11/02/18 10:55 Dose:  50 mg


Metoprolol Tartrate (Lopressor Injection -)  5 mg IVPUSH Q4H PRN


   PRN Reason: HR>130 bpm


   Last Admin: 10/30/18 15:26 Dose:  5 mg


Pantoprazole Sodium (Protonix -)  40 mg PO DAILY [facility]


   Last Admin: 11/02/18 10:34 Dose:  40 mg


Polyethylene Glycol (Miralax (For Daily Use) -)  17 gm PO DAILY [facility]


   Last Admin: 11/02/18 11:00 Dose:  17 gm


Prednisone 10 mg/ Prednisone 5 (mg)  15 mg PO HS [facility]


   Last Admin: 11/01/18 22:23 Dose:  15 mg





 CBC, BMP





 10/31/18 06:30 





 11/02/18 05:30 











Bgm -- Noted 





Physical exam


S1 S2 irregular


Lungs decreased at the bases


ABd- soft, NT


Edema +1


alert and awake








assessment and plan


clinically stable


continue present care


Monitor on telemetry


Cardiology to follow  for rapid atrial fibrillation


not anticoagulation candidate.


tapering prednisone


Add basal insulin--- monitor BJM


Will follow








Problem List





- Problems


(1) Hypoglycemia


Code(s): E16.2 - HYPOGLYCEMIA, UNSPECIFIED   





(2) New onset atrial fibrillation


Code(s): I48.91 - UNSPECIFIED ATRIAL FIBRILLATION   





(3) Syncope


Code(s): R55 - SYNCOPE AND COLLAPSE   


Qualifiers: 


   Syncope type: unspecified   Qualified Code(s): R55 - Syncope and collapse   





(4) Acute on chronic renal failure


Code(s): N17.9 - ACUTE KIDNEY FAILURE, UNSPECIFIED; N18.9 - CHRONIC KIDNEY 

DISEASE, UNSPECIFIED

## 2018-11-02 NOTE — PN
Progress Note (short form)





- Note


Progress Note: 








 88 year old female history of DM, HLD, CKD, HTN, Temporal arteritis. Patient 

is on tapering dose of steroid. Patient lives in NH /Rehab. Milagros has episode 

of LOC lasting for sixty secons. She has no seizure like activity . According 

to chart Sugar was 60, ct head is normal. She has new onset atrial fbrillation. 

According to son, she has been weak and mentally not very sharp recently for 

past two months. 


She is admitted to [facility] for syncope work up. There is no new complain, no 

seizure , no LOC. She denies any headhace. 


Her mri of brain is unremarkable. She continue to remain slightly confused .








Neurological Examination


Alert oriented x 2, follow command,  could tell me this november and 2018 and 

could not tell what day is today 





eomi, pupils reactive, no face asymmetry


motor 5/5 all extremity


sensation is noraml, reflexa re generalized diminished





ct head unremarkable


mri of brain is unremarkable 








1. Most likley to be syncope, less likely to be stroke or seizure. mri of brain 

is unremarkable.


2.  Cognitive difficulty seems to be related to metabolic encphalopathy 

including steroid use and renal failure and possible dementia. There is no 

evidence of meningitis or stroke








will continue to follow up with primary 





Continue supportive care and PT





Thanking you so much


Kalpesh Villareal

## 2018-11-02 NOTE — PN
Progress Note (short form)





- Note


Progress Note: 


CC: syncope





s: no chest pain, palps, dizzy, lightheadedness





  


  


 Current Medications











Generic Name Dose Route Start Last Admin





  Trade Name Freq  PRN Reason Stop Dose Admin


 


Acetaminophen  325 mg  10/29/18 17:32  





  Tylenol -  PO   





  Q6H PRN   





  PAIN   





     





     





     


 


Allopurinol  100 mg  10/30/18 10:00  11/02/18 10:34





  Zyloprim -  PO   100 mg





  DAILY ELLA   Administration





     





     





     





     


 


Atorvastatin Calcium  20 mg  10/29/18 22:00  11/01/18 22:22





  Lipitor -  PO   20 mg





  HS ELLA   Administration





     





     





     





     


 


Gabapentin  200 mg  10/29/18 22:00  11/02/18 06:10





  Neurontin -  PO   200 mg





  TID ELLA   Administration





     





     





     





     


 


Insulin Aspart  1 vial  10/29/18 18:45  11/02/18 06:10





  Novolog Vial Sliding Scale -  SQ   8 units





  Q6H ELLA   Administration





     





     





  Protocol   





     


 


Insulin Detemir  15 units  11/02/18 22:00  





  Levemir Vial  SQ   





  HS ELLA   





     





     





     





     


 


Latanoprost  1 drop  10/29/18 22:00  11/01/18 22:23





  Xalatan 0.005% Eye Drops -  OU   1 drop





  HS ELLA   Administration





     





     





     





     


 


Metoprolol Succinate  50 mg  11/02/18 10:00  11/02/18 10:55





  Toprol Xl -  PO   50 mg





  BID ELLA   Administration





     





     





     





     


 


Metoprolol Tartrate  5 mg  10/30/18 14:17  10/30/18 15:26





  Lopressor Injection -  IVPUSH   5 mg





  Q4H PRN   Administration





  HR>130 bpm   





     





     





     


 


Pantoprazole Sodium  40 mg  10/30/18 10:00  11/02/18 10:34





  Protonix -  PO   40 mg





  DAILY ELLA   Administration





     





     





     





     


 


Polyethylene Glycol  17 gm  10/30/18 10:00  11/02/18 11:00





  Miralax (For Daily Use) -  PO   17 gm





  DAILY ELLA   Administration





     





     





     





     


 


Prednisone 10 mg/ Prednisone 5  15 mg  10/29/18 22:00  11/01/18 22:23





  mg  PO   15 mg





  HS ELLA   Administration





     





     





     





     








 Vital Signs











 Period  Temp  Pulse  Resp  BP Sys/Boothe  Pulse Ox


 


 Last 24 Hr  97.4 F-98.5 F    20-20  /60-72  98














nad, calm


jvd flat, neck supple


ctab, nl effort


rrr nl s1, s2 no mrg


+ bs soft nt nd, obese


no le edema.  no c/c


no jaundice, diaphoresis





  CBC, BMP





 10/31/18 06:30 





 11/02/18 05:30 











 


ekg:  sr, pacs, pvcs, rbbb





cxr: no congestion/infiltrates





tele:  afib, rate ok





head ct: wnl





echo 12/2017 :nl lv/rv size/fn.  impaired relaxation.  1+ lae. nl valves.  nl 

ivc.  no rvsp. 





holter 12/2016: avg HR 56 bpm.  no bradyarrhythmias.  no sig pauses.  1% pvc 

burden.  one run of psvt (4 beats).  





stress test 2/2018: No Ekg changes.  No ischemia.  nl perf/EF. 


stress test 2014: no ekg changes, sx's. bline hr 52 bpm.  nl perfusion/EF. 





echo 9/2018:  nl lv/rv, no sig valve path





EKG: afib, rate 108





tele: afib, mild rvr at times








A/P: 89 yo f with h/o HTN, hl, dchf vs venous insufficiency, DM, CKD, COPD, 

anemia, hyperkalemia,  gout, diverticulosis/gastritis/gib, who p/w syncope. 





syncope


- may be in setting of hypoglycemia


- neuro following, MRI of brain unremarkable


- patient unable to give history, doesn't remember event


- monitoring on tele, afib no pauses





Afib


- no prior hx


- rates still fast at times, will dc norvasc to allow bp room and start toprol 

50 bid


- [id number] score 6, anticoagulation indicated. low platelets noted although 

improved from prior admission. 


- discussed with patient's son, given history of significant rectal bleeding, 

anemia, low platelets as well as fall risk will defer anticoagulation.  

Understands risk of stroke





elevated trops:


-borderline trop elevation 0.17->0.11  flat trend, nl ck, and less than prior 

baseline values-->not consistent with acs





htn


- cont bb





venous insufficiency vs chronic diastolic hf


- bun/Cr rising, h/o CKD


- was on furosemide 40 mg BID prior, restarted furosemide 40 mg daily but bun/

cr up again, will hold lasix for now. monitor volume status, labs. when cr at 

baseline would resume lasix 40 po qd.





hld:


-cont statin





diana on ckd


- as above





DM, hypoglycemia


- manage per primary team

## 2018-11-03 LAB
ALBUMIN SERPL-MCNC: 2.6 G/DL (ref 3.4–5)
ALP SERPL-CCNC: 119 U/L (ref 45–117)
ALT SERPL-CCNC: 33 U/L (ref 13–61)
ANION GAP SERPL CALC-SCNC: 8 MMOL/L (ref 8–16)
ANISOCYTOSIS BLD QL: (no result)
AST SERPL-CCNC: 18 U/L (ref 15–37)
BASOPHILS # BLD: 0.2 % (ref 0–2)
BILIRUB SERPL-MCNC: 0.5 MG/DL (ref 0.2–1)
BUN SERPL-MCNC: 103 MG/DL (ref 7–18)
CALCIUM SERPL-MCNC: 8.6 MG/DL (ref 8.5–10.1)
CHLORIDE SERPL-SCNC: 97 MMOL/L (ref 98–107)
CO2 SERPL-SCNC: 30 MMOL/L (ref 21–32)
CREAT SERPL-MCNC: 2.3 MG/DL (ref 0.55–1.3)
DEPRECATED RDW RBC AUTO: 20.7 % (ref 11.6–15.6)
EOSINOPHIL # BLD: 0.1 % (ref 0–4.5)
GLUCOSE SERPL-MCNC: 192 MG/DL (ref 74–106)
HCT VFR BLD CALC: 28 % (ref 32.4–45.2)
HGB BLD-MCNC: 9.4 GM/DL (ref 10.7–15.3)
LYMPHOCYTES # BLD: 12.9 % (ref 8–40)
MACROCYTES BLD QL: (no result)
MCH RBC QN AUTO: 30.6 PG (ref 25.7–33.7)
MCHC RBC AUTO-ENTMCNC: 33.5 G/DL (ref 32–36)
MCV RBC: 91.5 FL (ref 80–96)
MONOCYTES # BLD AUTO: 4.2 % (ref 3.8–10.2)
NEUTROPHILS # BLD: 82.6 % (ref 42.8–82.8)
PLATELET # BLD AUTO: 104 K/MM3 (ref 134–434)
PLATELET BLD QL SMEAR: (no result)
PMV BLD: 9.8 FL (ref 7.5–11.1)
POTASSIUM SERPLBLD-SCNC: 4.4 MMOL/L (ref 3.5–5.1)
PROT SERPL-MCNC: 5.2 G/DL (ref 6.4–8.2)
RBC # BLD AUTO: 3.06 M/MM3 (ref 3.6–5.2)
SODIUM SERPL-SCNC: 135 MMOL/L (ref 136–145)
WBC # BLD AUTO: 5.2 K/MM3 (ref 4–10)

## 2018-11-03 RX ADMIN — PREDNISONE SCH MG: 10 TABLET ORAL at 22:37

## 2018-11-03 RX ADMIN — LATANOPROST SCH: 50 SOLUTION OPHTHALMIC at 23:00

## 2018-11-03 RX ADMIN — GABAPENTIN SCH MG: 100 CAPSULE ORAL at 06:23

## 2018-11-03 RX ADMIN — INSULIN ASPART SCH: 100 INJECTION, SOLUTION INTRAVENOUS; SUBCUTANEOUS at 06:22

## 2018-11-03 RX ADMIN — PANTOPRAZOLE SODIUM SCH MG: 40 TABLET, DELAYED RELEASE ORAL at 11:07

## 2018-11-03 RX ADMIN — INSULIN ASPART SCH UNITS: 100 INJECTION, SOLUTION INTRAVENOUS; SUBCUTANEOUS at 17:51

## 2018-11-03 RX ADMIN — INSULIN ASPART SCH UNITS: 100 INJECTION, SOLUTION INTRAVENOUS; SUBCUTANEOUS at 22:40

## 2018-11-03 RX ADMIN — GABAPENTIN SCH MG: 100 CAPSULE ORAL at 22:38

## 2018-11-03 RX ADMIN — ALLOPURINOL SCH MG: 100 TABLET ORAL at 11:07

## 2018-11-03 RX ADMIN — INSULIN ASPART SCH UNITS: 100 INJECTION, SOLUTION INTRAVENOUS; SUBCUTANEOUS at 06:23

## 2018-11-03 RX ADMIN — INSULIN DETEMIR SCH UNITS: 100 INJECTION, SOLUTION SUBCUTANEOUS at 22:39

## 2018-11-03 RX ADMIN — GABAPENTIN SCH MG: 100 CAPSULE ORAL at 14:52

## 2018-11-03 RX ADMIN — ATORVASTATIN CALCIUM SCH MG: 20 TABLET, FILM COATED ORAL at 22:38

## 2018-11-03 RX ADMIN — POLYETHYLENE GLYCOL 3350 SCH GM: 17 POWDER, FOR SOLUTION ORAL at 11:07

## 2018-11-03 NOTE — PN
Progress Note (short form)





- Note


Progress Note: 








 88 year old female history of DM, HLD, CKD, HTN, Temporal arteritis. Patient 

is on tapering dose of steroid. Patient lives in NH /Rehab. Milagros has episode 

of LOC lasting for sixty secons. She has no seizure like activity . According 

to chart Sugar was 60, ct head is normal. She has new onset atrial fbrillation. 

According to son, she has been weak and mentally not very sharp recently for 

past two months. 


She is admitted to [facility] for syncope work up. There is no new complain, no 

seizure , no LOC. She denies any headhace. 


Her mri of brain is unremarkable. 


spoke to nursing staff, no active neurological issue 








Neurological Examination


Alert oriented x 2, follow command,  could tell me this november and 2018 and 

could not tell what day is today 





eomi, pupils reactive, no face asymmetry


motor 5/5 all extremity


sensation is noraml, reflexa re generalized diminished





ct head unremarkable


mri of brain is unremarkable 








1. Most likley to be syncope, less likely to be stroke or seizure. mri of brain 

is unremarkable.


2.  Cognitive difficulty seems to be related to metabolic encphalopathy 

including steroid use and renal failure and possible dementia. 











will continue to follow up with primary 





Continue supportive care and PT





Thanking you so much


Kalpesh Villareal

## 2018-11-03 NOTE — PN
Progress Note (short form)





- Note


Progress Note: 








CC: syncope





s: no chest pain, palps, dizzy, lightheadedness





  


  


  Current Medications





Acetaminophen (Tylenol -)  325 mg PO Q6H PRN


   PRN Reason: PAIN


Allopurinol (Zyloprim -)  100 mg PO DAILY [facility]


   Last Admin: 11/03/18 11:07 Dose:  100 mg


Atorvastatin Calcium (Lipitor -)  20 mg PO [facility]


   Last Admin: 11/02/18 21:27 Dose:  20 mg


Gabapentin (Neurontin -)  200 mg PO TID [facility]


   Last Admin: 11/03/18 06:23 Dose:  200 mg


Insulin Aspart (Novolog Vial Sliding Scale -)  1 vial SQ Q6H [facility]; Protocol


   Last Admin: 11/03/18 06:23 Dose:  2 units


Insulin Detemir (Levemir Vial)  15 units SQ [facility]


   Last Admin: 11/02/18 22:23 Dose:  15 units


Latanoprost (Xalatan 0.005% Eye Drops -)  1 drop OU [facility]


   Last Admin: 11/02/18 21:27 Dose:  1 drop


Metoprolol Succinate (Toprol Xl -)  50 mg PO BID [facility]


   Last Admin: 11/03/18 11:07 Dose:  50 mg


Metoprolol Tartrate (Lopressor Injection -)  5 mg IVPUSH Q4H PRN


   PRN Reason: HR>130 bpm


   Last Admin: 10/30/18 15:26 Dose:  5 mg


Pantoprazole Sodium (Protonix -)  40 mg PO DAILY [facility]


   Last Admin: 11/03/18 11:07 Dose:  40 mg


Polyethylene Glycol (Miralax (For Daily Use) -)  17 gm PO DAILY [facility]


   Last Admin: 11/03/18 11:07 Dose:  17 gm


Prednisone 10 mg/ Prednisone 5 (mg)  15 mg PO [facility]


   Last Admin: 11/02/18 21:26 Dose:  15 mg





 Vital Signs











 Period  Temp  Pulse  Resp  BP Sys/Boothe  Pulse Ox


 


 Last 24 Hr  97.8 F-98.7 F    20-20  /55-65  98














nad, calm


jvd flat, neck supple


ctab, nl effort


rrr nl s1, s2 no mrg


+ bs soft nt nd, obese


no le edema.  no c/c


no jaundice, diaphoresis





 


ekg:  sr, pacs, pvcs, rbbb





cxr: no congestion/infiltrates





tele:  afib, rate ok





head ct: wnl





echo 12/2017 :nl lv/rv size/fn.  impaired relaxation.  1+ lae. nl valves.  nl 

ivc.  no rvsp. 





holter 12/2016: avg HR 56 bpm.  no bradyarrhythmias.  no sig pauses.  1% pvc 

burden.  one run of psvt (4 beats).  





stress test 2/2018: No Ekg changes.  No ischemia.  nl perf/EF. 


stress test 2014: no ekg changes, sx's. bline hr 52 bpm.  nl perfusion/EF. 





echo 9/2018:  nl lv/rv, no sig valve path





EKG: afib, rate 108





tele: afib, mild rvr at times








A/P: 87 yo f with h/o HTN, hl, dchf vs venous insufficiency, DM, CKD, COPD, 

anemia, hyperkalemia,  gout, diverticulosis/gastritis/gib, who p/w syncope. 





syncope


- may be in setting of hypoglycemia


- neuro following, MRI of brain unremarkable


- patient unable to give history, doesn't remember event


- monitoring on tele, afib no pauses





Afib


- no prior hx


- rates still fast at times,  dc norvasc to allow bp room


- continue toprol 50 bid, rates improved with occ RVR.  if rates still fast 

would start digoxin, BP runs low


- [id number] score 6, anticoagulation indicated. low platelets noted although 

improved from prior admission. 


- discussed with patient's son, given history of significant rectal bleeding, 

anemia, low platelets as well as fall risk will defer anticoagulation.  

Understands risk of stroke





elevated trops:


-borderline trop elevation 0.17->0.11  flat trend, nl ck, and less than prior 

baseline values-->not consistent with acs





htn


- cont bb





venous insufficiency vs chronic diastolic hf


- bun/Cr rising, h/o CKD


- was on furosemide 40 mg BID prior, restarted furosemide 40 mg daily but bun/

cr up again, will hold lasix for now. monitor volume status, labs. when cr at 

baseline would resume lasix 40 po qd.





hld:


-cont statin





diana on ckd


- as above





DM, hypoglycemia


- manage per primary team

## 2018-11-03 NOTE — PN
Progress Note (short form)





- Note


Progress Note: 





events noted


pt feels well


No SOB 


denies any complaints


family at bedside 


 Vital Signs - 24 hr











  11/02/18 11/02/18 11/03/18





  17:00 21:00 01:00


 


Temperature 98.4 F 98.4 F 97.8 F


 


Pulse Rate 116 H 96 H 89


 


Respiratory 20 20 20





Rate   


 


Blood Pressure 92/63 114/63 106/55 L


 


O2 Sat by Pulse  98 





Oximetry (%)   








 Current Medications











Generic Name Dose Route Start Last Admin





  Trade Name Freq  PRN Reason Stop Dose Admin


 


Acetaminophen  325 mg  10/29/18 17:32  





  Tylenol -  PO   





  Q6H PRN   





  PAIN   





     





     





     


 


Allopurinol  100 mg  10/30/18 10:00  11/03/18 11:07





  Zyloprim -  PO   100 mg





  DAILY ELLA   Administration





     





     





     





     


 


Atorvastatin Calcium  20 mg  10/29/18 22:00  11/02/18 21:27





  Lipitor -  PO   20 mg





  HS ELLA   Administration





     





     





     





     


 


Gabapentin  200 mg  10/29/18 22:00  11/03/18 06:23





  Neurontin -  PO   200 mg





  TID ELLA   Administration





     





     





     





     


 


Insulin Aspart  1 vial  10/29/18 18:45  11/03/18 06:23





  Novolog Vial Sliding Scale -  SQ   2 units





  Q6H ELLA   Administration





     





     





  Protocol   





     


 


Insulin Detemir  15 units  11/02/18 22:00  11/02/18 22:23





  Levemir Vial  SQ   15 units





  HS ELLA   Administration





     





     





     





     


 


Latanoprost  1 drop  10/29/18 22:00  11/02/18 21:27





  Xalatan 0.005% Eye Drops -  OU   1 drop





  HS ELLA   Administration





     





     





     





     


 


Metoprolol Succinate  50 mg  11/02/18 10:00  11/03/18 11:07





  Toprol Xl -  PO   50 mg





  BID ELLA   Administration





     





     





     





     


 


Metoprolol Tartrate  5 mg  10/30/18 14:17  10/30/18 15:26





  Lopressor Injection -  IVPUSH   5 mg





  Q4H PRN   Administration





  HR>130 bpm   





     





     





     


 


Pantoprazole Sodium  40 mg  10/30/18 10:00  11/03/18 11:07





  Protonix -  PO   40 mg





  DAILY ELLA   Administration





     





     





     





     


 


Polyethylene Glycol  17 gm  10/30/18 10:00  11/03/18 11:07





  Miralax (For Daily Use) -  PO   17 gm





  DAILY ELLA   Administration





     





     





     





     


 


Prednisone 10 mg/ Prednisone 5  15 mg  10/29/18 22:00  11/02/18 21:26





  mg  PO   15 mg





  HS ELLA   Administration





     





     





     





     








 Laboratory Results - last 24 hr











  11/02/18 11/02/18 11/03/18





  16:48 22:21 01:22


 


WBC   


 


RBC   


 


Hgb   


 


Hct   


 


MCV   


 


MCH   


 


MCHC   


 


RDW   


 


Plt Count   


 


MPV   


 


Absolute Neuts (auto)   


 


Neutrophils %   


 


Lymphocytes %   


 


Monocytes %   


 


Eosinophils %   


 


Basophils %   


 


Nucleated RBC %   


 


Hypochromia   


 


Platelet Estimate   


 


Polychromasia   


 


Poikilocytosis   


 


Anisocytosis   


 


Microcytosis   


 


Macrocytosis   


 


Sodium   


 


Potassium   


 


Chloride   


 


Carbon Dioxide   


 


Anion Gap   


 


BUN   


 


Creatinine   


 


Creat Clearance w eGFR   


 


POC Glucometer  185  209  179


 


Random Glucose   


 


Calcium   


 


Total Bilirubin   


 


AST   


 


ALT   


 


Alkaline Phosphatase   


 


Total Protein   


 


Albumin   














  11/03/18 11/03/18 11/03/18





  05:30 05:30 06:08


 


WBC  5.2  


 


RBC  3.06 L  


 


Hgb  9.4 L  


 


Hct  28.0 L  


 


MCV  91.5  


 


MCH  30.6  


 


MCHC  33.5  


 


RDW  20.7 H  


 


Plt Count  104 L  


 


MPV  9.8  


 


Absolute Neuts (auto)  4.3  


 


Neutrophils %  82.6  


 


Lymphocytes %  12.9  D  


 


Monocytes %  4.2  


 


Eosinophils %  0.1  D  


 


Basophils %  0.2  


 


Nucleated RBC %  0  


 


Hypochromia  0  


 


Platelet Estimate  Decreased  


 


Polychromasia  1+  


 


Poikilocytosis  0  


 


Anisocytosis  2+  


 


Microcytosis  0  


 


Macrocytosis  1+  


 


Sodium   135 L 


 


Potassium   4.4 


 


Chloride   97 L 


 


Carbon Dioxide   30 


 


Anion Gap   8 


 


BUN   103 H 


 


Creatinine   2.3 H 


 


Creat Clearance w eGFR   20.01 


 


POC Glucometer    200


 


Random Glucose   192 H 


 


Calcium   8.6 


 


Total Bilirubin   0.5 


 


AST   18 


 


ALT   33 


 


Alkaline Phosphatase   119 H 


 


Total Protein   5.2 L 


 


Albumin   2.6 L 














  11/03/18





  13:00


 


WBC 


 


RBC 


 


Hgb 


 


Hct 


 


MCV 


 


MCH 


 


MCHC 


 


RDW 


 


Plt Count 


 


MPV 


 


Absolute Neuts (auto) 


 


Neutrophils % 


 


Lymphocytes % 


 


Monocytes % 


 


Eosinophils % 


 


Basophils % 


 


Nucleated RBC % 


 


Hypochromia 


 


Platelet Estimate 


 


Polychromasia 


 


Poikilocytosis 


 


Anisocytosis 


 


Microcytosis 


 


Macrocytosis 


 


Sodium 


 


Potassium 


 


Chloride 


 


Carbon Dioxide 


 


Anion Gap 


 


BUN 


 


Creatinine 


 


Creat Clearance w eGFR 


 


POC Glucometer  118


 


Random Glucose 


 


Calcium 


 


Total Bilirubin 


 


AST 


 


ALT 


 


Alkaline Phosphatase 


 


Total Protein 


 


Albumin 














S1 S2 RRR


Lungs decreased, no rales 


ABd- soft, NT


Edema + decreased








PLAN


off Lasix--renal function slight improvement


Does not appear to be in volume overload


MRI brain - no acute abnormality 


monitor blood sugars


avoid hypoglycemia








Problem List





- Problems


(1) Hypoglycemia


Code(s): E16.2 - HYPOGLYCEMIA, UNSPECIFIED   





(2) New onset atrial fibrillation


Code(s): I48.91 - UNSPECIFIED ATRIAL FIBRILLATION   





(3) Syncope


Code(s): R55 - SYNCOPE AND COLLAPSE   


Qualifiers: 


   Syncope type: unspecified   Qualified Code(s): R55 - Syncope and collapse   





(4) Acute on chronic renal failure


Code(s): N17.9 - ACUTE KIDNEY FAILURE, UNSPECIFIED; N18.9 - CHRONIC KIDNEY 

DISEASE, UNSPECIFIED

## 2018-11-04 LAB
ANION GAP SERPL CALC-SCNC: 8 MMOL/L (ref 8–16)
BUN SERPL-MCNC: 96 MG/DL (ref 7–18)
CALCIUM SERPL-MCNC: 8.4 MG/DL (ref 8.5–10.1)
CHLORIDE SERPL-SCNC: 100 MMOL/L (ref 98–107)
CO2 SERPL-SCNC: 29 MMOL/L (ref 21–32)
CREAT SERPL-MCNC: 2.1 MG/DL (ref 0.55–1.3)
GLUCOSE SERPL-MCNC: 234 MG/DL (ref 74–106)
POTASSIUM SERPLBLD-SCNC: 4.7 MMOL/L (ref 3.5–5.1)
SODIUM SERPL-SCNC: 137 MMOL/L (ref 136–145)

## 2018-11-04 RX ADMIN — POLYETHYLENE GLYCOL 3350 SCH: 17 POWDER, FOR SOLUTION ORAL at 12:22

## 2018-11-04 RX ADMIN — ATORVASTATIN CALCIUM SCH MG: 20 TABLET, FILM COATED ORAL at 21:18

## 2018-11-04 RX ADMIN — PANTOPRAZOLE SODIUM SCH MG: 40 TABLET, DELAYED RELEASE ORAL at 12:22

## 2018-11-04 RX ADMIN — INSULIN ASPART SCH UNITS: 100 INJECTION, SOLUTION INTRAVENOUS; SUBCUTANEOUS at 21:24

## 2018-11-04 RX ADMIN — INSULIN ASPART SCH: 100 INJECTION, SOLUTION INTRAVENOUS; SUBCUTANEOUS at 12:00

## 2018-11-04 RX ADMIN — GABAPENTIN SCH MG: 100 CAPSULE ORAL at 05:29

## 2018-11-04 RX ADMIN — GABAPENTIN SCH MG: 100 CAPSULE ORAL at 21:21

## 2018-11-04 RX ADMIN — INSULIN ASPART SCH UNITS: 100 INJECTION, SOLUTION INTRAVENOUS; SUBCUTANEOUS at 06:04

## 2018-11-04 RX ADMIN — INSULIN ASPART SCH UNITS: 100 INJECTION, SOLUTION INTRAVENOUS; SUBCUTANEOUS at 18:34

## 2018-11-04 RX ADMIN — PREDNISONE SCH MG: 10 TABLET ORAL at 21:18

## 2018-11-04 RX ADMIN — ALLOPURINOL SCH MG: 100 TABLET ORAL at 12:22

## 2018-11-04 RX ADMIN — LATANOPROST SCH DROP: 50 SOLUTION OPHTHALMIC at 21:32

## 2018-11-04 RX ADMIN — GABAPENTIN SCH MG: 100 CAPSULE ORAL at 14:00

## 2018-11-04 NOTE — PN
Progress Note (short form)





- Note


Progress Note: 








CC: syncope





s: no chest pain, palps, dizzy, lightheadedness, edema





  


  


  


 Current Medications





Acetaminophen (Tylenol -)  325 mg PO Q6H PRN


   PRN Reason: PAIN


Allopurinol (Zyloprim -)  100 mg PO DAILY [facility]


   Last Admin: 11/03/18 11:07 Dose:  100 mg


Atorvastatin Calcium (Lipitor -)  20 mg PO HS [facility]


   Last Admin: 11/03/18 22:38 Dose:  20 mg


Gabapentin (Neurontin -)  200 mg PO TID [facility]


   Last Admin: 11/04/18 05:29 Dose:  200 mg


Insulin Aspart (Novolog Vial Sliding Scale -)  1 vial SQ [facility]; Protocol


   Last Admin: 11/04/18 06:04 Dose:  4 units


Insulin Detemir (Levemir Vial)  15 units SQ [facility]


   Last Admin: 11/03/18 22:39 Dose:  15 units


Latanoprost (Xalatan 0.005% Eye Drops -)  1 drop OU [facility]


   Last Admin: 11/03/18 23:00 Dose:  Not Given


Metoprolol Succinate (Toprol Xl -)  50 mg PO BID [facility]


   Last Admin: 11/03/18 22:37 Dose:  50 mg


Metoprolol Tartrate (Lopressor Injection -)  5 mg IVPUSH Q4H PRN


   PRN Reason: HR>130 bpm


   Last Admin: 10/30/18 15:26 Dose:  5 mg


Pantoprazole Sodium (Protonix -)  40 mg PO DAILY [facility]


   Last Admin: 11/03/18 11:07 Dose:  40 mg


Polyethylene Glycol (Miralax (For Daily Use) -)  17 gm PO DAILY [facility]


   Last Admin: 11/03/18 11:07 Dose:  17 gm


Prednisone 10 mg/ Prednisone 5 (mg)  15 mg PO [facility]


   Last Admin: 11/03/18 22:37 Dose:  15 mg











  Vital Signs











 Period  Temp  Pulse  Resp  BP Sys/Boothe  Pulse Ox


 


 Last 24 Hr  97.6 F-98.4 F    18-20  /  98











nad, calm


jvd flat, neck supple


ctab, nl effort


rrr nl s1, s2 no mrg


+ bs soft nt nd, obese


no le edema.  no c/c


no jaundice, diaphoresis





 


ekg:  sr, pacs, pvcs, rbbb





cxr: no congestion/infiltrates





tele:  afib, rate ok





head ct: wnl





echo 12/2017 :nl lv/rv size/fn.  impaired relaxation.  1+ lae. nl valves.  nl 

ivc.  no rvsp. 





holter 12/2016: avg HR 56 bpm.  no bradyarrhythmias.  no sig pauses.  1% pvc 

burden.  one run of psvt (4 beats).  





stress test 2/2018: No Ekg changes.  No ischemia.  nl perf/EF. 


stress test 2014: no ekg changes, sx's. bline hr 52 bpm.  nl perfusion/EF. 





echo 9/2018:  nl lv/rv, no sig valve path





EKG: afib, rate 108





tele: afib, mild rvr at times








A/P: 89 yo f with h/o HTN, hl, dchf vs venous insufficiency, DM, CKD, COPD, 

anemia, hyperkalemia,  gout, diverticulosis/gastritis/gib, who p/w syncope. 





syncope


- neuro following, MRI of brain unremarkable


- patient unable to give history, doesn't remember event


- monitoring on tele, afib no pauses





Afib


- no prior hx


- rates still fast at times,  dc norvasc to allow bp room


- continue toprol 50 bid, rates improved with occ RVR.  if rates still fast 

would start digoxin, BP runs low


- [id number] score 6, anticoagulation indicated. low platelets noted although 

improved from prior admission. 


- discussed with patient's son, given history of significant rectal bleeding, 

anemia, low platelets as well as fall risk will defer anticoagulation.  

Understands risk of stroke





elevated trops:


-borderline trop elevation 0.17->0.11  flat trend, nl ck, and less than prior 

baseline values-->not consistent with acs





htn


- cont bb





venous insufficiency vs chronic diastolic hf


- bun/Cr rising, h/o CKD


- was on furosemide 40 mg BID prior, restarted furosemide 40 mg daily but bun/

cr up again, will hold lasix for now. monitor volume status, labs. when cr at 

baseline would resume lasix 40 po qd.





hld:


-cont statin





diana on ckd


- as above





DM, hypoglycemia


- manage per primary team

## 2018-11-04 NOTE — PN
Progress Note (short form)





- Note


Progress Note: 








pt feels well


No SOB 


denies any complaints





 Vital Signs - 24 hr











  11/03/18 11/03/18 11/04/18





  21:00 22:00 01:00 EST


 


Temperature  97.6 F 98.4 F


 


Pulse Rate  87 117 H


 


Respiratory 20 20 20





Rate   


 


Blood Pressure  111/45 L 137/67


 


O2 Sat by Pulse 98  





Oximetry (%)   














  11/04/18 11/04/18 11/04/18





  05:00 09:00 14:00


 


Temperature 98.2 F  98.3 F


 


Pulse Rate 95 H 85 98 H


 


Respiratory 20 18 20





Rate   


 


Blood Pressure 150/107 H 120/62 112/59 L


 


O2 Sat by Pulse   





Oximetry (%)   








 Current Medications











Generic Name Dose Route Start Last Admin





  Trade Name Freq  PRN Reason Stop Dose Admin


 


Acetaminophen  325 mg  10/29/18 17:32  





  Tylenol -  PO   





  Q6H PRN   





  PAIN   





     





     





     


 


Allopurinol  100 mg  10/30/18 10:00  11/04/18 12:22





  Zyloprim -  PO   100 mg





  DAILY ELLA   Administration





     





     





     





     


 


Atorvastatin Calcium  20 mg  10/29/18 22:00  11/03/18 22:38





  Lipitor -  PO   20 mg





  HS ELLA   Administration





     





     





     





     


 


Gabapentin  200 mg  10/29/18 22:00  11/04/18 14:00





  Neurontin -  PO   200 mg





  TID ELLA   Administration





     





     





     





     


 


Insulin Aspart  1 vial  11/04/18 07:00  11/04/18 18:34





  Novolog Vial Sliding Scale -  SQ   2 units





  [facility] ELLA   Administration





     





     





  Protocol   





     


 


Insulin Detemir  17 units  11/04/18 19:35  





  Levemir Vial  SQ   





  HS ELLA   





     





     





     





     


 


Latanoprost  1 drop  10/29/18 22:00  11/03/18 23:00





  Xalatan 0.005% Eye Drops -  OU   Not Given





  HS ELLA   





     





     





     





     


 


Metoprolol Succinate  50 mg  11/02/18 10:00  11/04/18 12:22





  Toprol Xl -  PO   50 mg





  BID ELLA   Administration





     





     





     





     


 


Metoprolol Tartrate  5 mg  10/30/18 14:17  10/30/18 15:26





  Lopressor Injection -  IVPUSH   5 mg





  Q4H PRN   Administration





  HR>130 bpm   





     





     





     


 


Pantoprazole Sodium  40 mg  10/30/18 10:00  11/04/18 12:22





  Protonix -  PO   40 mg





  DAILY ELLA   Administration





     





     





     





     


 


Polyethylene Glycol  17 gm  10/30/18 10:00  11/04/18 12:22





  Miralax (For Daily Use) -  PO   Not Given





  DAILY [facility]   





     





     





     





     


 


Prednisone 10 mg/ Prednisone 5  15 mg  10/29/18 22:00  11/03/18 22:37





  mg  PO   15 mg





  HS [facility]   Administration





     





     





     





     








 Laboratory Results - last 24 hr











  11/03/18 11/04/18 11/04/18





  22:38 05:21 05:30


 


Sodium    137


 


Potassium    4.7


 


Chloride    100


 


Carbon Dioxide    29


 


Anion Gap    8


 


BUN    96 H


 


Creatinine    2.1 H


 


Creat Clearance w eGFR    22.23


 


POC Glucometer  295  218 


 


Random Glucose    234 H


 


Calcium    8.4 L














  11/04/18





  18:33


 


Sodium 


 


Potassium 


 


Chloride 


 


Carbon Dioxide 


 


Anion Gap 


 


BUN 


 


Creatinine 


 


Creat Clearance w eGFR 


 


POC Glucometer  193


 


Random Glucose 


 


Calcium 

















S1 S2 RRR


Lungs decreased, no rales 


ABd- soft, NT


Edema + decreased








PLAN


off Lasix--renal function slight improvement


may resume tomorrow 


Does not appear to be in volume overload


MRI brain - no acute abnormality 


monitor blood sugars-- increase Levemir


avoid hypoglycemia


dc plan for NH 





Problem List





- Problems


(1) Hypoglycemia


Code(s): E16.2 - HYPOGLYCEMIA, UNSPECIFIED   





(2) New onset atrial fibrillation


Code(s): I48.91 - UNSPECIFIED ATRIAL FIBRILLATION   





(3) Syncope


Code(s): R55 - SYNCOPE AND COLLAPSE   


Qualifiers: 


   Syncope type: unspecified   Qualified Code(s): R55 - Syncope and collapse   





(4) Acute on chronic renal failure


Code(s): N17.9 - ACUTE KIDNEY FAILURE, UNSPECIFIED; N18.9 - CHRONIC KIDNEY 

DISEASE, UNSPECIFIED

## 2018-11-05 VITALS — SYSTOLIC BLOOD PRESSURE: 120 MMHG | HEART RATE: 92 BPM | DIASTOLIC BLOOD PRESSURE: 66 MMHG

## 2018-11-05 VITALS — TEMPERATURE: 97.7 F

## 2018-11-05 LAB
ANION GAP SERPL CALC-SCNC: 8 MMOL/L (ref 8–16)
BUN SERPL-MCNC: 73 MG/DL (ref 7–18)
CALCIUM SERPL-MCNC: 8.7 MG/DL (ref 8.5–10.1)
CHLORIDE SERPL-SCNC: 103 MMOL/L (ref 98–107)
CO2 SERPL-SCNC: 29 MMOL/L (ref 21–32)
CREAT SERPL-MCNC: 1.7 MG/DL (ref 0.55–1.3)
GLUCOSE SERPL-MCNC: 103 MG/DL (ref 74–106)
POTASSIUM SERPLBLD-SCNC: 4.8 MMOL/L (ref 3.5–5.1)
SODIUM SERPL-SCNC: 139 MMOL/L (ref 136–145)

## 2018-11-05 RX ADMIN — INSULIN ASPART SCH: 100 INJECTION, SOLUTION INTRAVENOUS; SUBCUTANEOUS at 06:28

## 2018-11-05 RX ADMIN — PANTOPRAZOLE SODIUM SCH MG: 40 TABLET, DELAYED RELEASE ORAL at 11:39

## 2018-11-05 RX ADMIN — GABAPENTIN SCH MG: 100 CAPSULE ORAL at 06:33

## 2018-11-05 RX ADMIN — INSULIN ASPART SCH UNITS: 100 INJECTION, SOLUTION INTRAVENOUS; SUBCUTANEOUS at 13:52

## 2018-11-05 RX ADMIN — ALLOPURINOL SCH MG: 100 TABLET ORAL at 11:40

## 2018-11-05 RX ADMIN — POLYETHYLENE GLYCOL 3350 SCH GM: 17 POWDER, FOR SOLUTION ORAL at 11:39

## 2018-11-05 NOTE — DS
Physical Examination


Vital Signs: 


 Vital Signs











Temperature  97.7 F   11/05/18 06:00


 


Pulse Rate  92 H  11/05/18 09:34


 


Respiratory Rate  18   11/05/18 09:34


 


Blood Pressure  120/66   11/05/18 09:34


 


O2 Sat by Pulse Oximetry (%)  99   11/04/18 21:00











Findings/Remarks: 


Patient seen and examined today.


Chart reviewed


Awake and comfortable


no complaints offered


Afebrile


 denies pain








Constitutional: Yes: No Distress, Calm


Eyes: Yes: Conjunctiva Clear


Neck: Yes: Supple


Cardiovascular: Yes: Pulse Irregular


Respiratory: Yes: Diminished


Gastrointestinal: Yes: Soft


Edema: No


Neurological: Yes: Alert


Psychiatric: Yes: Alert


Labs: 


 CBC, BMP





 11/03/18 05:30 





 11/05/18 06:50 











Discharge Summary


Reason For Visit: SYNCOPE/NEW ONSET ATRIAL FIBRILLATION


Current Active Problems





Hypoglycemia (Acute)


New onset atrial fibrillation (Acute)


Syncope (Acute)


Temporal arteritis (Acute)








Hospital Course: 


in summary


88 yrs old F H/O HTN, T2DM, Dyslipedemia, CKD stage 4 GFR 21-25, , NSTEMI, HFpEF

, not on ASa for GI bleeding, Temporal arteritis diagnosed in Sept 29 2018,on 

PO Prednisone taper current dose is 15 mg, chronic anemia, current in a [facility] for

  Rehab m, transferred to [facility] after a  witnessed episode of TLOC





Patient admitted to telemetry


cardiology and neurology followed


Workup significant for--- new onset A. fib


MRI brain--okay


Patient  was not placed on anticoagulation--- due to history of GI bleed/ low 

platelets -


Patient overall stable


Stable for discharge back to nursing home


Medications reconciled


Prednisone to be tapered slowly in the nursing home


 discussed with nursing staff/ 


I also discussed with patient's son in detail


Discharge time--approximate 30 minutes----in documenting / examination and 

coordinating care


Patient also in agreement





Condition: Improved





- Instructions


Referrals: 


Jace Mckee MD [Primary Care Provider] - 


Disposition: SKILLED NURSING FACILITY





- Home Medications


Comprehensive Discharge Medication List: 


Ambulatory Orders





Acetaminophen 325 mg PO QID PRN 10/29/18 


Allopurinol [Zyloprim -] 100 mg PO DAILY 10/29/18 


Atorvastatin Ca [Lipitor] 20 mg PO HS 10/29/18 


Gabapentin [Neurontin -] 200 mg PO Q8H 10/29/18 


Glipizide [Glucotrol -] 5 mg PO DAILY 10/29/18 


Insulin (Novolog) [Novolog -] 0 units SQ [facility] 10/29/18 


Latanoprost/Pf [Latanoprost 0.005% Eye Drop] 1 drop OU HS 10/29/18 


Pantoprazole Sodium [Protonix] 40 mg PO DAILY 10/29/18 


Polyethylene Glycol 3350 [Gavilax] 17 gm PO DAILY 10/29/18 


Prednisone 15 mg PO DAILY 10/29/18 


Furosemide [Lasix] 40 mg PO DAILY #30 tablet 11/04/18 


Insulin (Levemir) [Levemir Vial] 17 units SQ HS #14 units 11/04/18 


Metoprolol Succinate [Toprol XL -] 50 mg PO BID #30 tab.sr.24h 11/04/18

## 2020-03-09 NOTE — PDOC
Attending Attestation





- Resident


Resident Name: Debbie Newman MYRA





- HPI


HPI: 





05/06/18 17:33


The patient is an 87 year old female with past medical history of hypertension, 

diabetes, CKD, COPD, anemia, GOUT who arrives from home with complaints of 3 

days of worsening dizziness. The patient reports a room spinning sensation that 

is present with change in position. She also endorses nasal congestion and 

headache for the past week. Denies any nausea, vomiting, diarrhea. Denies any 

LOC or visual changes. Denies fevers. 





- Medical Decision Making





05/06/18 17:33


Documentation prepared by Alyson Carrillo, acting as medical scribe for 

Tiffany Iglesias MD.





<Alyson Carrillo - Last Filed: 05/06/18 17:32>





- Physicial Exam


PE: 





05/24/18 21:00


Agree with resident exam.  PAtient is well appearing and neurologically intact.

  Is able to ambulate but is slightly unsteady.  





- Medical Decision Making








05/24/18 21:00


Pt presents to the ED complaining of vertigo.  She is neurologically intact, 

but given her age, she is at risk for cerebellar stroke and should be evaluated 

with MRI.  Also has acute renal failure on labs.  will admit to medicine for 

MRI and for management of acute renal failure. 





<Tiffany Iglesias - Last Filed: 05/24/18 21:02> TENDERNESS/PAIN

## 2022-12-22 NOTE — PN
Progress Note (short form)





- Note


Progress Note: 





CC: syncope





s: no chest pain, palps, dizzy, lightheadedness





 Current Medications





Acetaminophen (Tylenol -)  325 mg PO Q6H PRN


   PRN Reason: PAIN


Allopurinol (Zyloprim -)  100 mg PO DAILY [facility]


   Last Admin: 10/31/18 10:02 Dose:  100 mg


Amlodipine Besylate (Norvasc -)  5 mg PO DAILY [facility]


   Last Admin: 10/31/18 10:02 Dose:  5 mg


Atorvastatin Calcium (Lipitor -)  20 mg PO HS [facility]


   Last Admin: 10/30/18 22:00 Dose:  20 mg


Furosemide (Lasix -)  40 mg PO DAILY [facility]


   Last Admin: 10/31/18 10:01 Dose:  40 mg


Gabapentin (Neurontin -)  200 mg PO TID [facility]


   Last Admin: 10/31/18 06:12 Dose:  200 mg


Insulin Aspart (Novolog Vial Sliding Scale -)  1 vial SQ Q6H [facility]; Protocol


   Last Admin: 10/31/18 06:12 Dose:  Not Given


Latanoprost (Xalatan 0.005% Eye Drops -)  1 drop OU HS [facility]


   Last Admin: 10/30/18 22:18 Dose:  1 drop


Metoprolol Tartrate (Lopressor -)  25 mg PO TID [facility]


   Last Admin: 10/31/18 06:12 Dose:  Not Given


Metoprolol Tartrate (Lopressor Injection -)  5 mg IVPUSH Q4H PRN


   PRN Reason: HR>130 bpm


   Last Admin: 10/30/18 15:26 Dose:  5 mg


Pantoprazole Sodium (Protonix -)  40 mg PO DAILY [facility]


   Last Admin: 10/31/18 10:01 Dose:  40 mg


Polyethylene Glycol (Miralax (For Daily Use) -)  17 gm PO DAILY [facility]


   Last Admin: 10/31/18 10:02 Dose:  17 gm


Prednisone 10 mg/ Prednisone 5 (mg)  15 mg PO HS [facility]


   Last Admin: 10/30/18 22:00 Dose:  15 mg








  


 Vital Signs











 Period  Temp  Pulse  Resp  BP Sys/Boothe  Pulse Ox


 


 Last 24 Hr  97.1 F-98.4 F    17-20  /52-76  











nad, calm


jvd flat, neck supple


ctab, nl effort


rrr nl s1, s2 no mrg


+ bs soft nt nd, obese


trace le edema.  no c/c


+ dp/pt, no carotid bruits


aaox3


no jaundice, diaphoresis








 


ekg:  sr, pacs, pvcs, rbbb





cxr: no congestion/infiltrates





tele:  sr, occ pvcs





head ct: wnl





echo 12/2017 :nl lv/rv size/fn.  impaired relaxation.  1+ lae. nl valves.  nl 

ivc.  no rvsp. 





holter 12/2016: avg HR 56 bpm.  no bradyarrhythmias.  no sig pauses.  1% pvc 

burden.  one run of psvt (4 beats).  





stress test 2/2018: No Ekg changes.  No ischemia.  nl perf/EF. 


stress test 2014: no ekg changes, sx's. bline hr 52 bpm.  nl perfusion/EF. 





echo 9/2018:  nl lv/rv, no sig valve path





EKG: afib, rate 108





tele: afib 80s-100s








A/P: 89 yo f with h/o HTN, hl, dchf vs venous insufficiency, DM, CKD, COPD, 

anemia, hyperkalemia,  gout, diverticulosis/gastritis/gib, who p/w syncope. 





syncope


- may be in setting of hypoglycemia


- neuro following, MRI of brain ordered


- patient unable to give history, doesn't remember event


- monitoring on tele, afib no pauses





Afib


- no prior hx


- rates 110s-120s, start metoprolol 25 mg TID, prn IV metoprolol


- [id number] score 6, anticoagulation indicated. low platelets noted although 

improved from prior admission. 


- discussed with patient's son today, given history of significant rectal 

bleeding, anemia, low platelets as well as fall risk will defer 

anticoagulation.  Understands risk of stroke





elevated trops:


-borderline trop elevation 0.17->0.11  flat trend, nl ck, and less than prior 

baseline values-->not consistent with acs





htn


- cont norvasc 





venous insufficiency vs chronic diastolic hf


- Cr rising, h/o CKD


- was on furosemide 40 mg BID prior, restarted furosemide 40 mg daily. monitor 

volume status





hld:


-cont statin





diana on ckd


- cr rising 2.2->2.7


- volume status appears stable, ?prerenal








DM, hypoglycemia


- manage per primary team Patent